# Patient Record
Sex: FEMALE | Race: WHITE | Employment: OTHER | URBAN - METROPOLITAN AREA
[De-identification: names, ages, dates, MRNs, and addresses within clinical notes are randomized per-mention and may not be internally consistent; named-entity substitution may affect disease eponyms.]

---

## 2021-01-31 ENCOUNTER — IMMUNIZATION (OUTPATIENT)
Dept: NURSING | Facility: CLINIC | Age: 81
End: 2021-01-31
Payer: COMMERCIAL

## 2021-01-31 PROCEDURE — 91300 PR COVID VAC PFIZER DIL RECON 30 MCG/0.3 ML IM: CPT

## 2021-01-31 PROCEDURE — 0001A PR COVID VAC PFIZER DIL RECON 30 MCG/0.3 ML IM: CPT

## 2021-02-21 ENCOUNTER — IMMUNIZATION (OUTPATIENT)
Dept: NURSING | Facility: CLINIC | Age: 81
End: 2021-02-21
Attending: FAMILY MEDICINE
Payer: COMMERCIAL

## 2021-02-21 PROCEDURE — 91300 PR COVID VAC PFIZER DIL RECON 30 MCG/0.3 ML IM: CPT

## 2021-02-21 PROCEDURE — 0002A PR COVID VAC PFIZER DIL RECON 30 MCG/0.3 ML IM: CPT

## 2021-04-10 ENCOUNTER — HEALTH MAINTENANCE LETTER (OUTPATIENT)
Age: 81
End: 2021-04-10

## 2021-07-17 ENCOUNTER — HOSPITAL ENCOUNTER (EMERGENCY)
Dept: CT IMAGING | Facility: CLINIC | Age: 81
End: 2021-07-17
Payer: COMMERCIAL

## 2021-07-17 ENCOUNTER — HOSPITAL ENCOUNTER (OUTPATIENT)
Facility: CLINIC | Age: 81
Setting detail: OBSERVATION
Discharge: HOME | End: 2021-07-19
Attending: EMERGENCY MEDICINE | Admitting: INTERNAL MEDICINE
Payer: COMMERCIAL

## 2021-07-17 DIAGNOSIS — B37.31 YEAST VAGINITIS: ICD-10-CM

## 2021-07-17 DIAGNOSIS — N30.00 ACUTE CYSTITIS WITHOUT HEMATURIA: Primary | ICD-10-CM

## 2021-07-17 DIAGNOSIS — R55 SYNCOPE: ICD-10-CM

## 2021-07-17 DIAGNOSIS — K59.00 CONSTIPATION, UNSPECIFIED CONSTIPATION TYPE: ICD-10-CM

## 2021-07-17 PROBLEM — R30.0 DYSURIA: Status: ACTIVE | Noted: 2021-07-17

## 2021-07-17 PROBLEM — H34.8392 VENOUS TRIBUTARY (BRANCH) OCCLUSION OF RETINA (H): Status: ACTIVE | Noted: 2021-07-17

## 2021-07-17 LAB
ALBUMIN SERPL-MCNC: 4 G/DL (ref 3.5–5)
ALBUMIN UR-MCNC: NEGATIVE MG/DL
ALP SERPL-CCNC: 116 U/L (ref 45–120)
ALT SERPL W P-5'-P-CCNC: 18 U/L (ref 0–45)
ANION GAP SERPL CALCULATED.3IONS-SCNC: 10 MMOL/L (ref 5–18)
APPEARANCE UR: CLEAR
AST SERPL W P-5'-P-CCNC: 23 U/L (ref 0–40)
ATRIAL RATE - MUSE: 79 BPM
ATRIAL RATE - MUSE: 86 BPM
BACTERIA #/AREA URNS HPF: ABNORMAL /HPF
BASOPHILS # BLD AUTO: 0 10E3/UL (ref 0–0.2)
BASOPHILS NFR BLD AUTO: 0 %
BILIRUB SERPL-MCNC: 0.6 MG/DL (ref 0–1)
BILIRUB UR QL STRIP: NEGATIVE
BUN SERPL-MCNC: 20 MG/DL (ref 8–28)
CALCIUM SERPL-MCNC: 9.3 MG/DL (ref 8.5–10.5)
CHLORIDE BLD-SCNC: 106 MMOL/L (ref 98–107)
CO2 SERPL-SCNC: 22 MMOL/L (ref 22–31)
COLOR UR AUTO: ABNORMAL
CREAT SERPL-MCNC: 0.75 MG/DL (ref 0.6–1.1)
D DIMER PPP FEU-MCNC: 0.64 UG/ML FEU (ref 0–0.5)
DIASTOLIC BLOOD PRESSURE - MUSE: 74 MMHG
DIASTOLIC BLOOD PRESSURE - MUSE: 77 MMHG
EOSINOPHIL # BLD AUTO: 0.1 10E3/UL (ref 0–0.7)
EOSINOPHIL NFR BLD AUTO: 1 %
ERYTHROCYTE [DISTWIDTH] IN BLOOD BY AUTOMATED COUNT: 12.6 % (ref 10–15)
GFR SERPL CREATININE-BSD FRML MDRD: 74 ML/MIN/1.73M2
GLUCOSE BLD-MCNC: 121 MG/DL (ref 70–125)
GLUCOSE BLDC GLUCOMTR-MCNC: 103 MG/DL (ref 70–125)
GLUCOSE UR STRIP-MCNC: NEGATIVE MG/DL
HCT VFR BLD AUTO: 38.3 % (ref 35–47)
HGB BLD-MCNC: 12.7 G/DL (ref 11.7–15.7)
HGB UR QL STRIP: NEGATIVE
HYALINE CASTS: 18 /LPF
IMM GRANULOCYTES # BLD: 0.1 10E3/UL
IMM GRANULOCYTES NFR BLD: 1 %
INTERPRETATION ECG - MUSE: NORMAL
INTERPRETATION ECG - MUSE: NORMAL
KETONES UR STRIP-MCNC: ABNORMAL MG/DL
LACTATE SERPL-SCNC: 2 MMOL/L (ref 0.7–2)
LEUKOCYTE ESTERASE UR QL STRIP: ABNORMAL
LYMPHOCYTES # BLD AUTO: 1.9 10E3/UL (ref 0.8–5.3)
LYMPHOCYTES NFR BLD AUTO: 19 %
MAGNESIUM SERPL-MCNC: 1.8 MG/DL (ref 1.8–2.6)
MCH RBC QN AUTO: 31.2 PG (ref 26.5–33)
MCHC RBC AUTO-ENTMCNC: 33.2 G/DL (ref 31.5–36.5)
MCV RBC AUTO: 94 FL (ref 78–100)
MONOCYTES # BLD AUTO: 0.7 10E3/UL (ref 0–1.3)
MONOCYTES NFR BLD AUTO: 7 %
MUCOUS THREADS #/AREA URNS LPF: PRESENT /LPF
NEUTROPHILS # BLD AUTO: 7.4 10E3/UL (ref 1.6–8.3)
NEUTROPHILS NFR BLD AUTO: 72 %
NITRATE UR QL: NEGATIVE
NRBC # BLD AUTO: 0 10E3/UL
NRBC BLD AUTO-RTO: 0 /100
P AXIS - MUSE: 58 DEGREES
P AXIS - MUSE: 61 DEGREES
PH UR STRIP: 7 [PH] (ref 5–7)
PLATELET # BLD AUTO: 290 10E3/UL (ref 150–450)
POTASSIUM BLD-SCNC: 3.7 MMOL/L (ref 3.5–5)
PR INTERVAL - MUSE: 178 MS
PR INTERVAL - MUSE: 186 MS
PROCALCITONIN SERPL-MCNC: 0.02 NG/ML (ref 0–0.49)
PROLACTIN SERPL-MCNC: 26.8 NG/ML (ref 0–20)
PROT SERPL-MCNC: 7 G/DL (ref 6–8)
QRS DURATION - MUSE: 80 MS
QRS DURATION - MUSE: 88 MS
QT - MUSE: 418 MS
QT - MUSE: 426 MS
QTC - MUSE: 488 MS
QTC - MUSE: 500 MS
R AXIS - MUSE: 14 DEGREES
R AXIS - MUSE: 30 DEGREES
RBC # BLD AUTO: 4.07 10E6/UL (ref 3.8–5.2)
RBC URINE: 2 /HPF
SARS-COV-2 RNA RESP QL NAA+PROBE: NEGATIVE
SODIUM SERPL-SCNC: 138 MMOL/L (ref 136–145)
SP GR UR STRIP: 1.01 (ref 1–1.03)
SQUAMOUS EPITHELIAL: 1 /HPF
SYSTOLIC BLOOD PRESSURE - MUSE: 162 MMHG
SYSTOLIC BLOOD PRESSURE - MUSE: 163 MMHG
T AXIS - MUSE: 42 DEGREES
T AXIS - MUSE: 62 DEGREES
TROPONIN I SERPL-MCNC: <0.01 NG/ML (ref 0–0.29)
UROBILINOGEN UR STRIP-MCNC: <2 MG/DL
VENTRICULAR RATE- MUSE: 79 BPM
VENTRICULAR RATE- MUSE: 86 BPM
WBC # BLD AUTO: 10.2 10E3/UL (ref 4–11)
WBC URINE: 12 /HPF

## 2021-07-17 PROCEDURE — 87635 SARS-COV-2 COVID-19 AMP PRB: CPT | Performed by: NURSE PRACTITIONER

## 2021-07-17 PROCEDURE — 250N000011 HC RX IP 250 OP 636: Performed by: NURSE PRACTITIONER

## 2021-07-17 PROCEDURE — C9803 HOPD COVID-19 SPEC COLLECT: HCPCS

## 2021-07-17 PROCEDURE — 87040 BLOOD CULTURE FOR BACTERIA: CPT | Performed by: NURSE PRACTITIONER

## 2021-07-17 PROCEDURE — 36592 COLLECT BLOOD FROM PICC: CPT | Performed by: NURSE PRACTITIONER

## 2021-07-17 PROCEDURE — 93005 ELECTROCARDIOGRAM TRACING: CPT | Performed by: EMERGENCY MEDICINE

## 2021-07-17 PROCEDURE — 71275 CT ANGIOGRAPHY CHEST: CPT

## 2021-07-17 PROCEDURE — 85379 FIBRIN DEGRADATION QUANT: CPT | Performed by: NURSE PRACTITIONER

## 2021-07-17 PROCEDURE — 99285 EMERGENCY DEPT VISIT HI MDM: CPT | Mod: 25

## 2021-07-17 PROCEDURE — 99220 PR INITIAL OBSERVATION CARE,LEVEL III: CPT | Performed by: INTERNAL MEDICINE

## 2021-07-17 PROCEDURE — 96361 HYDRATE IV INFUSION ADD-ON: CPT

## 2021-07-17 PROCEDURE — 80053 COMPREHEN METABOLIC PANEL: CPT | Performed by: NURSE PRACTITIONER

## 2021-07-17 PROCEDURE — 84145 PROCALCITONIN (PCT): CPT | Performed by: NURSE PRACTITIONER

## 2021-07-17 PROCEDURE — 36415 COLL VENOUS BLD VENIPUNCTURE: CPT | Performed by: NURSE PRACTITIONER

## 2021-07-17 PROCEDURE — 93005 ELECTROCARDIOGRAM TRACING: CPT | Performed by: NURSE PRACTITIONER

## 2021-07-17 PROCEDURE — 84484 ASSAY OF TROPONIN QUANT: CPT | Performed by: NURSE PRACTITIONER

## 2021-07-17 PROCEDURE — 74177 CT ABD & PELVIS W/CONTRAST: CPT

## 2021-07-17 PROCEDURE — G0378 HOSPITAL OBSERVATION PER HR: HCPCS

## 2021-07-17 PROCEDURE — 96376 TX/PRO/DX INJ SAME DRUG ADON: CPT

## 2021-07-17 PROCEDURE — 83735 ASSAY OF MAGNESIUM: CPT | Performed by: NURSE PRACTITIONER

## 2021-07-17 PROCEDURE — 250N000013 HC RX MED GY IP 250 OP 250 PS 637: Performed by: INTERNAL MEDICINE

## 2021-07-17 PROCEDURE — 84146 ASSAY OF PROLACTIN: CPT | Performed by: NURSE PRACTITIONER

## 2021-07-17 PROCEDURE — 85025 COMPLETE CBC W/AUTO DIFF WBC: CPT | Performed by: NURSE PRACTITIONER

## 2021-07-17 PROCEDURE — 258N000003 HC RX IP 258 OP 636: Performed by: NURSE PRACTITIONER

## 2021-07-17 PROCEDURE — 70450 CT HEAD/BRAIN W/O DYE: CPT

## 2021-07-17 PROCEDURE — 96375 TX/PRO/DX INJ NEW DRUG ADDON: CPT

## 2021-07-17 PROCEDURE — 258N000003 HC RX IP 258 OP 636: Performed by: INTERNAL MEDICINE

## 2021-07-17 PROCEDURE — 87086 URINE CULTURE/COLONY COUNT: CPT | Performed by: NURSE PRACTITIONER

## 2021-07-17 PROCEDURE — 96374 THER/PROPH/DIAG INJ IV PUSH: CPT | Mod: 59

## 2021-07-17 PROCEDURE — 83605 ASSAY OF LACTIC ACID: CPT | Performed by: NURSE PRACTITIONER

## 2021-07-17 PROCEDURE — 81001 URINALYSIS AUTO W/SCOPE: CPT | Performed by: NURSE PRACTITIONER

## 2021-07-17 RX ORDER — IOPAMIDOL 755 MG/ML
100 INJECTION, SOLUTION INTRAVASCULAR ONCE
Status: COMPLETED | OUTPATIENT
Start: 2021-07-17 | End: 2021-07-17

## 2021-07-17 RX ORDER — VALACYCLOVIR HYDROCHLORIDE 1 G/1
1 TABLET, FILM COATED ORAL 3 TIMES DAILY
COMMUNITY
Start: 2021-07-15 | End: 2023-01-01

## 2021-07-17 RX ORDER — LORAZEPAM 2 MG/ML
0.5 INJECTION INTRAMUSCULAR ONCE
Status: COMPLETED | OUTPATIENT
Start: 2021-07-17 | End: 2021-07-17

## 2021-07-17 RX ORDER — PANTOPRAZOLE SODIUM 40 MG/1
TABLET, DELAYED RELEASE ORAL
COMMUNITY
End: 2021-07-17

## 2021-07-17 RX ORDER — ONDANSETRON 2 MG/ML
4 INJECTION INTRAMUSCULAR; INTRAVENOUS ONCE
Status: COMPLETED | OUTPATIENT
Start: 2021-07-17 | End: 2021-07-17

## 2021-07-17 RX ORDER — ACETAMINOPHEN 325 MG/1
650 TABLET ORAL EVERY 4 HOURS PRN
Status: DISCONTINUED | OUTPATIENT
Start: 2021-07-17 | End: 2021-07-19 | Stop reason: HOSPADM

## 2021-07-17 RX ORDER — SODIUM CHLORIDE 9 MG/ML
INJECTION, SOLUTION INTRAVENOUS CONTINUOUS
Status: DISCONTINUED | OUTPATIENT
Start: 2021-07-17 | End: 2021-07-18

## 2021-07-17 RX ADMIN — ONDANSETRON 4 MG: 2 INJECTION INTRAMUSCULAR; INTRAVENOUS at 17:57

## 2021-07-17 RX ADMIN — IOPAMIDOL 100 ML: 755 INJECTION, SOLUTION INTRAVENOUS at 19:19

## 2021-07-17 RX ADMIN — SODIUM CHLORIDE 1000 ML: 9 INJECTION, SOLUTION INTRAVENOUS at 17:59

## 2021-07-17 RX ADMIN — LORAZEPAM 0.5 MG: 2 INJECTION INTRAMUSCULAR; INTRAVENOUS at 19:53

## 2021-07-17 RX ADMIN — MAGNESIUM HYDROXIDE 30 ML: 400 SUSPENSION ORAL at 23:45

## 2021-07-17 RX ADMIN — ACETAMINOPHEN 650 MG: 325 TABLET, FILM COATED ORAL at 23:43

## 2021-07-17 RX ADMIN — SODIUM CHLORIDE: 9 INJECTION, SOLUTION INTRAVENOUS at 23:06

## 2021-07-17 RX ADMIN — ONDANSETRON 4 MG: 2 INJECTION INTRAMUSCULAR; INTRAVENOUS at 18:35

## 2021-07-17 ASSESSMENT — ACTIVITIES OF DAILY LIVING (ADL)
DRESSING/BATHING_DIFFICULTY: NO
VISION_MANAGEMENT: READING
DOING_ERRANDS_INDEPENDENTLY_DIFFICULTY: NO
CONCENTRATING,_REMEMBERING_OR_MAKING_DECISIONS_DIFFICULTY: NO
WALKING_OR_CLIMBING_STAIRS: STAIR CLIMBING DIFFICULTY, ASSISTANCE 1 PERSON
DIFFICULTY_COMMUNICATING: NO
TOILETING_ISSUES: NO
DIFFICULTY_EATING/SWALLOWING: NO
WALKING_OR_CLIMBING_STAIRS_DIFFICULTY: YES
FALL_HISTORY_WITHIN_LAST_SIX_MONTHS: NO
WEAR_GLASSES_OR_BLIND: YES

## 2021-07-17 ASSESSMENT — ENCOUNTER SYMPTOMS
VOMITING: 1
HEADACHES: 1
ABDOMINAL PAIN: 1
SHORTNESS OF BREATH: 0
WEAKNESS: 1
NAUSEA: 1

## 2021-07-17 ASSESSMENT — MIFFLIN-ST. JEOR: SCORE: 1177.1

## 2021-07-17 NOTE — ED PROVIDER NOTES
EMERGENCY DEPARTMENT ENCOUNTER      NAME: Anabelle Cameron  AGE: 80 year old female  YOB: 1940  MRN: [id number]  EVALUATION DATE & TIME: 2021  5:22 PM    PCP: [facility]    ED PROVIDER: CARLOTA Akers, CNP      Chief Complaint   Patient presents with     Syncope         FINAL IMPRESSION:  1. Syncope    2. Constipation, unspecified constipation type          ED COURSE & MEDICAL DECISION MAKIN:41 PM I met with the patient, obtained history, performed an initial exam, and discussed options and plan for treatment here in the ED. PPE includes surgical mask.  6:12 PM I staffed case with Dr. Nayan Victoria MD.  6:17 PM I checked in with patient and obtained additional history from family.  6:50 PM rechecked patient. Did have a brief episode of chest pain. She is alert and daughter in law is at the bedside. Repeat EKG ordered.  7:45 PM updated patient and daughter in law with lab results. UA abnormal, but she denies any UTI symptoms.   8:15 PM I updated patient with lab and imaging results.   9:04 PM I discussed case with Dr. Scott - hospitalist for admission.    Pertinent Labs & Imaging studies reviewed. (See chart for details)  80 year old female presents to the Emergency Department for evaluation of syncope. Had 2 syncopal episodes today. History of vaso vagal syncope though family though that today's episodes were more abrupt and she did not improve as quickly. Did have shaking of her arms today but this is consistent with prior episodes. She was sitting in a chair and did not suffer any trauma. Labs are unremarkable. EKG's also stable appearing. Imaging or the head, chest and abdomen do not show any significant acute process. It does show constipation which the patient endorses. UA abnormal, but patient denies any clear UTI symptoms. Given the more profound nature of her symptoms today, will observe overnight on telemetry.     At the conclusion of the encounter  "I discussed the results of all of the tests and the disposition. The questions were answered. The patient or family acknowledged understanding and was agreeable with the care plan.        MEDICATIONS GIVEN IN THE EMERGENCY:  Medications   0.9% sodium chloride BOLUS (1,000 mLs Intravenous New Bag 7/17/21 1759)   ondansetron (ZOFRAN) injection 4 mg (4 mg Intravenous Given 7/17/21 1757)   ondansetron (ZOFRAN) injection 4 mg (4 mg Intravenous Given 7/17/21 1835)   LORazepam (ATIVAN) injection 0.5 mg (0.5 mg Intravenous Given 7/17/21 1953)       NEW PRESCRIPTIONS STARTED AT TODAY'S ER VISIT  New Prescriptions    No medications on file            =================================================================    HPI    Patient information was obtained from: Patient     Use of Intrepreter: N/A         Anabelle Cameron is a 80 year old female with a history of GERD, meningioma, depression, HLD, and pancreatitis who presents by EMS for evaluation of syncope.    Earlier today, patient was sitting a chair outside by the pool when she had a syncopal episode that lasted a few minutes. Denies any fall.   Starting today, she also endorses mild abdominal pain \"like I'm going to have diarrhea,\" nausea, emesis, mild headache, and generalized weakness. Denies chest pain, shortness of breath, or any other complaints.    Additional history per patient's daughter-in-law and  obtained at 6:17 PM.  Patient was sitting in the shade outside when she stopped talking midsentence and became pale.  They noted that her hands were shaking and her lips were smacking which is not unusual for her typical vasovagal episodes.  Family felt that her symptoms today were more abrupt.  She did come out of it for about 5 minutes and had a second episode.  Was helped to the floor.  There was no trauma. No other new symptoms noted.     REVIEW OF SYSTEMS   Review of Systems   Respiratory: Negative for shortness of breath.    Cardiovascular: Negative for " chest pain.   Gastrointestinal: Positive for abdominal pain, nausea and vomiting.   Neurological: Positive for syncope, weakness (generalized) and headaches.   All other systems reviewed and are negative.      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 LEFT EYE Occurred at time of IOL/ERM       PAST SURGICAL HISTORY:  Past Surgical History:   Procedure Laterality Date     APPENDECTOMY OPEN       GYN SURGERY       ORTHOPEDIC SURGERY             CURRENT MEDICATIONS:    Prior to Admission Medications   Prescriptions Last Dose Informant Patient Reported? Taking?   Estrogens Conjugated (PREMARIN PO)   Yes No   Sig: Take  by mouth.     Fexofenadine HCl (ALLEGRA PO)   Yes No   Sig: Take  by mouth.     multivitamin, therapeutic with minerals (THERA-VIT-M) TABS   Yes No   Sig: Take 1 tablet by mouth daily.     ondansetron (ZOFRAN ODT) 4 MG disintegrating tablet   No No   Sig: Take 1 tablet by mouth every 4 hours as needed for nausea for 5 doses.   pantoprazole (PROTONIX) 40 MG EC tablet   Yes No   Sig: pantoprazole 40 mg tablet,delayed release   sertraline (ZOLOFT) 20 MG/ML concentrated solution   Yes No   Sig: Take 50 mg by mouth daily.     valACYclovir (VALTREX) 1000 mg tablet   Yes Yes   Sig: Take 1 g by mouth      Facility-Administered Medications: None           ALLERGIES:  Allergies   Allergen Reactions     Levofloxacin Other (See Comments), Dizziness and Nausea     dizziness  Patient unable to tolerate side effects       Hydrocodone-Acetaminophen Other (See Comments)     Dizziness,  fainting  Other reaction(s): Syncope  Vasovagal episode         Oxycodone-Acetaminophen Other (See Comments)     Dizziness, fainting       FAMILY HISTORY:  History reviewed. No pertinent family history.    SOCIAL HISTORY:   Social History     Socioeconomic History     Marital status:      Spouse name: None     Number of children: None     Years of education: None     Highest education level: None   Occupational History     None   Tobacco Use     Smoking status: None   Substance and Sexual Activity     Alcohol use: Yes     Comment: social     Drug use: No     Sexual activity: None   Other Topics Concern     None   Social History Narrative     None     Social Determinants of Health     Financial Resource Strain:      Difficulty of Paying Living Expenses:    Food Insecurity:      Worried About Running Out of Food in the Last Year:      Ran Out of Food in the Last Year:    Transportation Needs:      Lack of Transportation (Medical):      Lack of Transportation (Non-Medical):    Physical Activity:      Days of Exercise per Week:      Minutes of Exercise per Session:    Stress:      Feeling of Stress :    Social Connections:      Frequency of Communication with Friends and Family:      Frequency of Social Gatherings with Friends and Family:      Attends Orthodoxy Services:      Active Member of Clubs or Organizations:      Attends Club or Organization Meetings:      Marital Status:    Intimate Partner Violence:      Fear of Current or Ex-Partner:      Emotionally Abused:      Physically Abused:      Sexually Abused:          VITALS:  Patient Vitals for the past 24 hrs:   BP Temp Temp src Pulse Resp SpO2   07/17/21 1900 (!) 152/72 -- -- 86 24 100 %   07/17/21 1845 -- -- -- 86 24 100 %   07/17/21 1830 (!) 162/74 -- -- 80 13 100 %   07/17/21 1815 -- -- -- 84 14 100 %   07/17/21 1800 (!) 163/65 -- -- 86 (!) 31 100 %   07/17/21 1745 -- -- -- 75 11 100 %   07/17/21 1741 -- -- -- 79 22 98 %   07/17/21 1740 -- -- -- 75 (!)  6 99 %   07/17/21 1739 -- -- -- 76 19 98 %   07/17/21 1738 -- -- -- 75 8 99 %   07/17/21 1736 -- -- -- 75 (!) 6 95 %   07/17/21 1735 -- -- -- 78 15 96 %   07/17/21 1734 -- -- -- 81 22 (!) 82 %   07/17/21 1733 -- -- -- 80 16 (!) 87 %   07/17/21 1732 -- -- -- 78 11 93 %   07/17/21 1731 -- -- -- 79 -- 95 %   07/17/21 1730 (!) 163/77 (!) 96.2  F (35.7  C) Temporal 80 17 93 %       PHYSICAL EXAM    Constitutional:  Drowsy. No distress  EYES: Conjunctivae clear  HENT:  Atraumatic, normocephalic. Oropharynx appears dry.  Respiratory:  No respiratory distress, normal breath sounds  Cardiovascular:  Normal rate, normal rhythm, no murmurs  GI:  Soft, nondistended, mild periumbilical tenderness, no palpable masses, no rebound, no guarding   Musculoskeletal:  No edema    Integument: Warm, Dry. Small raised red rash on the left breast.   Neurologic:  Alert & oriented x 3. CN 3-12 grossly intact. 4/5 bilateral , dorsiflexion, plantar flexion.     LAB:  All pertinent labs reviewed and interpreted.  Results for orders placed or performed during the hospital encounter of 07/17/21   CT Chest Pulmonary Embolism w Contrast    Impression    IMPRESSION:  1.  No pulmonary embolism.    2.  Few subpleural regions of scarring in the lungs.    3.  Small to moderate size esophageal hiatal hernia.    4.  Fatty liver.    5.  The rectum is distended with stool.    6.  Hysterectomy.    7.  Coronary artery calcification.   Head CT w/o contrast    Impression    IMPRESSION:    1.  No evidence of acute intracranial hemorrhage or mass effect.  2.  Calcified meningioma overlying the left parafalcine frontal lobe measuring 9 mm.   3.  Mild nonspecific white matter changes.  4.  Mild brain parenchymal volume loss.   CT Abdomen Pelvis w Contrast    Impression    IMPRESSION:  1.  No pulmonary embolism.    2.  Few subpleural regions of scarring in the lungs.    3.  Small to moderate size esophageal hiatal hernia.    4.  Fatty liver.    5.  The rectum is  distended with stool.    6.  Hysterectomy.    7.  Coronary artery calcification.   Result Value Ref Range    Magnesium 1.8 1.8 - 2.6 mg/dL   Comprehensive metabolic panel   Result Value Ref Range    Sodium 138 136 - 145 mmol/L    Potassium 3.7 3.5 - 5.0 mmol/L    Chloride 106 98 - 107 mmol/L    Carbon Dioxide (CO2) 22 22 - 31 mmol/L    Anion Gap 10 5 - 18 mmol/L    Urea Nitrogen 20 8 - 28 mg/dL    Creatinine 0.75 0.60 - 1.10 mg/dL    Calcium 9.3 8.5 - 10.5 mg/dL    Glucose 121 70 - 125 mg/dL    Alkaline Phosphatase 116 45 - 120 U/L    AST 23 0 - 40 U/L    ALT 18 0 - 45 U/L    Protein Total 7.0 6.0 - 8.0 g/dL    Albumin 4.0 3.5 - 5.0 g/dL    Bilirubin Total 0.6 0.0 - 1.0 mg/dL    GFR Estimate 74 >60 mL/min/1.73m2   UA with Microscopic reflex to Culture    Specimen: Urine, Midstream   Result Value Ref Range    Color Urine Light Yellow Colorless, Straw, Light Yellow, Yellow    Appearance Urine Clear Clear    Glucose Urine Negative Negative mg/dL    Bilirubin Urine Negative Negative    Ketones Urine Trace (A) Negative mg/dL    Specific Gravity Urine 1.014 1.001 - 1.030    Blood Urine Negative Negative    pH Urine 7.0 5.0 - 7.0    Protein Albumin Urine Negative Negative mg/dL    Urobilinogen Urine <2.0 <2.0 mg/dL    Nitrite Urine Negative Negative    Leukocyte Esterase Urine 75 Sherri/uL (A) Negative    Bacteria Urine Few (A) None Seen /HPF    Mucus Urine Present (A) None Seen /LPF    RBC Urine 2 <=2 /HPF    WBC Urine 12 (H) <=5 /HPF    Squamous Epithelials Urine 1 <=1 /HPF    Hyaline Casts Urine 18 (H) <=2 /LPF   Troponin I (now)   Result Value Ref Range    Troponin I <0.01 0.00 - 0.29 ng/mL   D dimer quantitative   Result Value Ref Range    D-Dimer Quantitative 0.64 (H) 0.00 - 0.50 ug/mL FEU   CBC with platelets and differential   Result Value Ref Range    WBC Count 10.2 4.0 - 11.0 10e3/uL    RBC Count 4.07 3.80 - 5.20 10e6/uL    Hemoglobin 12.7 11.7 - 15.7 g/dL    Hematocrit 38.3 35.0 - 47.0 %    MCV 94 78 - 100 fL     MCH 31.2 26.5 - 33.0 pg    MCHC 33.2 31.5 - 36.5 g/dL    RDW 12.6 10.0 - 15.0 %    Platelet Count 290 150 - 450 10e3/uL    % Neutrophils 72 %    % Lymphocytes 19 %    % Monocytes 7 %    % Eosinophils 1 %    % Basophils 0 %    % Immature Granulocytes 1 %    NRBCs per 100 WBC 0 <1 /100    Absolute Neutrophils 7.4 1.6 - 8.3 10e3/uL    Absolute Lymphocytes 1.9 0.8 - 5.3 10e3/uL    Absolute Monocytes 0.7 0.0 - 1.3 10e3/uL    Absolute Eosinophils 0.1 0.0 - 0.7 10e3/uL    Absolute Basophils 0.0 0.0 - 0.2 10e3/uL    Absolute Immature Granulocytes 0.1 (H) <=0.0 10e3/uL    Absolute NRBCs 0.0 10e3/uL   Lactic acid whole blood   Result Value Ref Range    Lactic Acid 2.0 0.7 - 2.0 mmol/L   Result Value Ref Range    Procalcitonin 0.02 0.00 - 0.49 ng/mL   Glucose by meter   Result Value Ref Range    GLUCOSE BY METER POCT 103 70 - 125 mg/dL   ECG 12-LEAD WITH MUSE ([facility])   Result Value Ref Range    Systolic Blood Pressure 163 mmHg    Diastolic Blood Pressure 77 mmHg    Ventricular Rate 79 BPM    Atrial Rate 79 BPM    AK Interval 178 ms    QRS Duration 88 ms     ms    QTc 488 ms    P Axis 58 degrees    R AXIS 14 degrees    T Axis 42 degrees    Interpretation ECG       Sinus rhythm  Prolonged QT  Abnormal ECG  When compared with ECG of 14-JAN-2021 17:26,  Criteria for Septal infarct are no longer Present  Confirmed by SEE ED PROVIDER NOTE FOR, ECG INTERPRETATION ([id number]),  MORTEZA ENRIQUEZ ([id number]) on 7/17/2021 5:49:03 PM     ECG 12-LEAD WITH MUSE (L[facility])   Result Value Ref Range    Systolic Blood Pressure 162 mmHg    Diastolic Blood Pressure 74 mmHg    Ventricular Rate 86 BPM    Atrial Rate 86 BPM    AK Interval 186 ms    QRS Duration 80 ms     ms    QTc 500 ms    P Axis 61 degrees    R AXIS 30 degrees    T Axis 62 degrees    Interpretation ECG       Sinus rhythm  Prolonged QT  Abnormal ECG  When compared with ECG of 17-JUL-2021 17:34,  No significant change was found  Confirmed by SEE ED PROVIDER NOTE FOR, ECG  INTERPRETATION ([id number]),  SABRINA SPANN ([id number]) on 7/17/2021 7:17:49 PM         RADIOLOGY:  Reviewed all pertinent imaging. Please see official radiology report.  Head CT w/o contrast   Final Result   IMPRESSION:     1.  No evidence of acute intracranial hemorrhage or mass effect.   2.  Calcified meningioma overlying the left parafalcine frontal lobe measuring 9 mm.    3.  Mild nonspecific white matter changes.   4.  Mild brain parenchymal volume loss.      CT Abdomen Pelvis w Contrast   Final Result   IMPRESSION:   1.  No pulmonary embolism.      2.  Few subpleural regions of scarring in the lungs.      3.  Small to moderate size esophageal hiatal hernia.      4.  Fatty liver.      5.  The rectum is distended with stool.      6.  Hysterectomy.      7.  Coronary artery calcification.      CT Chest Pulmonary Embolism w Contrast   Final Result   IMPRESSION:   1.  No pulmonary embolism.      2.  Few subpleural regions of scarring in the lungs.      3.  Small to moderate size esophageal hiatal hernia.      4.  Fatty liver.      5.  The rectum is distended with stool.      6.  Hysterectomy.      7.  Coronary artery calcification.        EKG Interpretation  7/17/2021 at 5:34 PM    Rhythm: normal sinus  Rate: 79 BPM  Axis: normal  Ectopy: none  Conduction: QTc interval 488 ms  ST Segments: no acute change  T Waves: no acute change  Q Waves: none    Clinical Impression: Sinus rhythm.  Prolonged QTC.  No ischemic changes.  When compared to prior EKG performed on 1/14/2021, evidence for septal infarct no longer present.  No other significant change  I have independentlyreviewed and interpreted the patient's EKG with comments made as listed above.  Please see scanned image for full interpretation    EKG Interpretation  7/17/2021 at 6:54 PM    Rhythm: normal sinus  Rate: 86 BPM  Axis: normal  Ectopy: none  Conduction: Long QTC, 500 ms  ST Segments: no acute change  T Waves: no acute change  Q Waves: none    Clinical  Impression: Sinus rhythm.  No ischemic changes.  Prolonged QTC.    I have independentlyreviewed and interpreted the patient's EKG with comments made as listed above.  Please see scanned image for full interpretation      PROCEDURES:   None      I, Meena [place], am serving as a scribe to document services personally performed by Zeeshan Carlson CNP. based on my observation and the provider's statements to me. I, Zeeshan Carlson CNP attest that Meena Celaya is acting in a scribe capacity, has observed my performance of the services and has documented them in accordance with my direction.    CARLOTA Akers, CNP  Emergency Medicine  [facility] EMERGENCY ROOM  [address] [id number]  [phone]  Dept: [phone]           Zeeshan Carlson APRN CNP  07/17/21 [id number]

## 2021-07-17 NOTE — ED TRIAGE NOTES
Pt brought in by EMS after fainting twice today. Pt was sitting outside for about 303-60 minutes before this happened and then passed out. Denies  Injury as she states she was sitting in a chair each time it happened. Pt had IV established by EMS, 20 in left mid forearm.

## 2021-07-18 ENCOUNTER — APPOINTMENT (OUTPATIENT)
Dept: PHYSICAL THERAPY | Facility: CLINIC | Age: 81
End: 2021-07-18
Attending: FAMILY MEDICINE
Payer: COMMERCIAL

## 2021-07-18 ENCOUNTER — HOSPITAL ENCOUNTER (OUTPATIENT)
Dept: CARDIOLOGY | Facility: CLINIC | Age: 81
Setting detail: OBSERVATION
End: 2021-07-18
Attending: INTERNAL MEDICINE
Payer: COMMERCIAL

## 2021-07-18 DIAGNOSIS — R55 SYNCOPE: Primary | ICD-10-CM

## 2021-07-18 LAB
ALBUMIN SERPL-MCNC: 3.4 G/DL (ref 3.5–5)
ALP SERPL-CCNC: 100 U/L (ref 45–120)
ALT SERPL W P-5'-P-CCNC: 14 U/L (ref 0–45)
ANION GAP SERPL CALCULATED.3IONS-SCNC: 5 MMOL/L (ref 5–18)
AST SERPL W P-5'-P-CCNC: 19 U/L (ref 0–40)
BILIRUB SERPL-MCNC: 0.6 MG/DL (ref 0–1)
BUN SERPL-MCNC: 16 MG/DL (ref 8–28)
CALCIUM SERPL-MCNC: 8.6 MG/DL (ref 8.5–10.5)
CHLORIDE BLD-SCNC: 106 MMOL/L (ref 98–107)
CO2 SERPL-SCNC: 24 MMOL/L (ref 22–31)
CREAT SERPL-MCNC: 0.63 MG/DL (ref 0.6–1.1)
ERYTHROCYTE [DISTWIDTH] IN BLOOD BY AUTOMATED COUNT: 12.5 % (ref 10–15)
GFR SERPL CREATININE-BSD FRML MDRD: 84 ML/MIN/1.73M2
GLUCOSE BLD-MCNC: 97 MG/DL (ref 70–125)
HCT VFR BLD AUTO: 35.5 % (ref 35–47)
HGB BLD-MCNC: 11.8 G/DL (ref 11.7–15.7)
MCH RBC QN AUTO: 31.4 PG (ref 26.5–33)
MCHC RBC AUTO-ENTMCNC: 33.2 G/DL (ref 31.5–36.5)
MCV RBC AUTO: 94 FL (ref 78–100)
PLATELET # BLD AUTO: 279 10E3/UL (ref 150–450)
POTASSIUM BLD-SCNC: 3.8 MMOL/L (ref 3.5–5)
PROT SERPL-MCNC: 6 G/DL (ref 6–8)
RBC # BLD AUTO: 3.76 10E6/UL (ref 3.8–5.2)
SODIUM SERPL-SCNC: 135 MMOL/L (ref 136–145)
TROPONIN I SERPL-MCNC: <0.01 NG/ML (ref 0–0.29)
WBC # BLD AUTO: 9.1 10E3/UL (ref 4–11)

## 2021-07-18 PROCEDURE — 999N000208 ECHOCARDIOGRAM COMPLETE

## 2021-07-18 PROCEDURE — 97116 GAIT TRAINING THERAPY: CPT | Mod: GP

## 2021-07-18 PROCEDURE — 250N000013 HC RX MED GY IP 250 OP 250 PS 637: Performed by: INTERNAL MEDICINE

## 2021-07-18 PROCEDURE — 93010 ELECTROCARDIOGRAM REPORT: CPT | Performed by: INTERNAL MEDICINE

## 2021-07-18 PROCEDURE — 255N000002 HC RX 255 OP 636: Performed by: INTERNAL MEDICINE

## 2021-07-18 PROCEDURE — 93005 ELECTROCARDIOGRAM TRACING: CPT | Performed by: INTERNAL MEDICINE

## 2021-07-18 PROCEDURE — 84484 ASSAY OF TROPONIN QUANT: CPT | Performed by: INTERNAL MEDICINE

## 2021-07-18 PROCEDURE — 99226 PR SUBSEQUENT OBSERVATION CARE,LEVEL III: CPT | Performed by: FAMILY MEDICINE

## 2021-07-18 PROCEDURE — 84155 ASSAY OF PROTEIN SERUM: CPT | Performed by: INTERNAL MEDICINE

## 2021-07-18 PROCEDURE — 97161 PT EVAL LOW COMPLEX 20 MIN: CPT | Mod: GP

## 2021-07-18 PROCEDURE — 96361 HYDRATE IV INFUSION ADD-ON: CPT

## 2021-07-18 PROCEDURE — 258N000003 HC RX IP 258 OP 636: Performed by: INTERNAL MEDICINE

## 2021-07-18 PROCEDURE — 82247 BILIRUBIN TOTAL: CPT | Performed by: INTERNAL MEDICINE

## 2021-07-18 PROCEDURE — 999N000157 HC STATISTIC RCP TIME EA 10 MIN

## 2021-07-18 PROCEDURE — 250N000013 HC RX MED GY IP 250 OP 250 PS 637: Performed by: FAMILY MEDICINE

## 2021-07-18 PROCEDURE — 93306 TTE W/DOPPLER COMPLETE: CPT | Mod: 26 | Performed by: INTERNAL MEDICINE

## 2021-07-18 PROCEDURE — 36415 COLL VENOUS BLD VENIPUNCTURE: CPT | Performed by: INTERNAL MEDICINE

## 2021-07-18 PROCEDURE — 85027 COMPLETE CBC AUTOMATED: CPT | Performed by: INTERNAL MEDICINE

## 2021-07-18 PROCEDURE — G0378 HOSPITAL OBSERVATION PER HR: HCPCS

## 2021-07-18 PROCEDURE — 96375 TX/PRO/DX INJ NEW DRUG ADDON: CPT | Mod: XU

## 2021-07-18 PROCEDURE — 250N000011 HC RX IP 250 OP 636: Performed by: FAMILY MEDICINE

## 2021-07-18 RX ORDER — VALACYCLOVIR HYDROCHLORIDE 1 G/1
1000 TABLET, FILM COATED ORAL 3 TIMES DAILY
Status: DISCONTINUED | OUTPATIENT
Start: 2021-07-18 | End: 2021-07-19 | Stop reason: HOSPADM

## 2021-07-18 RX ORDER — AMOXICILLIN 250 MG
1 CAPSULE ORAL 2 TIMES DAILY
Status: COMPLETED | OUTPATIENT
Start: 2021-07-18 | End: 2021-07-18

## 2021-07-18 RX ORDER — PANTOPRAZOLE SODIUM 20 MG/1
40 TABLET, DELAYED RELEASE ORAL 2 TIMES DAILY
Status: DISCONTINUED | OUTPATIENT
Start: 2021-07-18 | End: 2021-07-19 | Stop reason: HOSPADM

## 2021-07-18 RX ORDER — CEFTRIAXONE 1 G/1
1 INJECTION, POWDER, FOR SOLUTION INTRAMUSCULAR; INTRAVENOUS EVERY 24 HOURS
Status: DISCONTINUED | OUTPATIENT
Start: 2021-07-18 | End: 2021-07-19 | Stop reason: HOSPADM

## 2021-07-18 RX ADMIN — CEFTRIAXONE 1 G: 1 INJECTION, POWDER, FOR SOLUTION INTRAMUSCULAR; INTRAVENOUS at 08:46

## 2021-07-18 RX ADMIN — ACETAMINOPHEN 650 MG: 325 TABLET, FILM COATED ORAL at 21:02

## 2021-07-18 RX ADMIN — DOCUSATE SODIUM 50MG AND SENNOSIDES 8.6MG 1 TABLET: 8.6; 5 TABLET, FILM COATED ORAL at 21:01

## 2021-07-18 RX ADMIN — ACETAMINOPHEN 650 MG: 325 TABLET, FILM COATED ORAL at 08:46

## 2021-07-18 RX ADMIN — SODIUM CHLORIDE: 9 INJECTION, SOLUTION INTRAVENOUS at 13:13

## 2021-07-18 RX ADMIN — DOCUSATE SODIUM 50MG AND SENNOSIDES 8.6MG 1 TABLET: 8.6; 5 TABLET, FILM COATED ORAL at 08:57

## 2021-07-18 RX ADMIN — PANTOPRAZOLE SODIUM 40 MG: 20 TABLET, DELAYED RELEASE ORAL at 08:46

## 2021-07-18 RX ADMIN — PANTOPRAZOLE SODIUM 40 MG: 20 TABLET, DELAYED RELEASE ORAL at 21:02

## 2021-07-18 RX ADMIN — PERFLUTREN 2 ML: 6.52 INJECTION, SUSPENSION INTRAVENOUS at 11:35

## 2021-07-18 ASSESSMENT — MIFFLIN-ST. JEOR: SCORE: 1188.9

## 2021-07-18 NOTE — PLAN OF CARE
Problem: Syncope  Goal: Absence of Syncopal Symptoms  Outcome: Improving   Patient denies any dizziness/lightheadedness. Orthostatic pressures unremarkable.     Waiting for urine culture results. Patient on IV antibiotics.     Mild headache present, improved with tylenol.     Lesa Corrigan RN 7/18/2021

## 2021-07-18 NOTE — PLAN OF CARE
Physical Therapy Discharge Summary    Reason for therapy discharge:    All goals and outcomes met, no further needs identified.    Progress towards therapy goal(s). See goals on Care Plan in [facility] electronic health record for goal details.  Goals met    Therapy recommendation(s):    No further therapy is recommended.Patient to amb with nursing staff while remains in hospital.

## 2021-07-18 NOTE — ED PROVIDER NOTES
"ED CONSULTATION  Date/Time:7/17/2021 6:16 PM    I am seeing this patient along with Zeeshan Carlson CNP.  I, Nayan Victoria MD have reviewed the documentation, personally taken the patient's history, performed an exam and agree with the physical finds, diagnosis and management plan.    HPI: Anabelle Cameron is a 80 year old female with a history of GERD, meningioma, depression, HLD, and pancreatitis who presents by EMS for evaluation of syncope. Earlier today, patient was sitting a chair outside by the pool when she had a syncopal episode that lasted a few minutes. Denies any fall. Starting today, she also endorses mild abdominal pain \"like I'm going to have diarrhea,\" nausea, emesis, mild headache, and generalized weakness.    Physical Exam:BP (!) 152/72   Pulse 86   Temp (!) 96.2  F (35.7  C) (Temporal)   Resp 24   SpO2 100%   Constitutional:  Well developed, Well nourished  HENT:  Normocephalic, Atraumatic, Bilateral external ears normal, Oropharynx moist, No oral exudates, Nose normal. Neck- Normal range of motion   Eyes: Conjunctiva normal, No discharge.   Respiratory:  Normal breath sounds, No respiratory distress, No wheezing  Cardiovascular:  Normal heart rate, Normal rhythm.   GI:  Soft, No tenderness, No masses, No flank tenderness.   Musculoskeletal: Full ROM  Integument:  Warm, Dry, No erythema, No rash.    Neurologic:  Alert & oriented.  No focal deficits appreciated  Psychiatric:  Affect normal, Judgment normal, Mood normal.     MDM/ ED Course : Only female with apparent syncopal/presyncopal event fairly prolonged.  Friend who accompanies her states she had a similar episode but less prolonged last week.  No preceding vomiting or diarrhea to suggest fluid losses however patient had been out of doors but in the shade.  She states she tolerates heat poorly.  She also reports has been constipated recently but was not having any urges at the time not having any nausea or vomiting.  Her exam is quite " benign.  Patient had been recently started on valacyclovir for potential shingles to the left breast.  On inspection patient has a few tiny erythematous papules which are not entirely irritating.  Patient may be tolerating the valacyclovir poorly and it was recommended that this be discontinued and topical antiitch medication be used.  Given patient's age and symptomatology will will admit for monitoring.  8:15 PM I introduced myself to the patient and performed my initial exam.       Final diagnosis:  Syncope    Results for orders placed or performed during the hospital encounter of 07/17/21   CT Chest Pulmonary Embolism w Contrast    Impression    IMPRESSION:  1.  No pulmonary embolism.    2.  Few subpleural regions of scarring in the lungs.    3.  Small to moderate size esophageal hiatal hernia.    4.  Fatty liver.    5.  The rectum is distended with stool.    6.  Hysterectomy.    7.  Coronary artery calcification.   Head CT w/o contrast    Impression    IMPRESSION:    1.  No evidence of acute intracranial hemorrhage or mass effect.  2.  Calcified meningioma overlying the left parafalcine frontal lobe measuring 9 mm.   3.  Mild nonspecific white matter changes.  4.  Mild brain parenchymal volume loss.   CT Abdomen Pelvis w Contrast    Impression    IMPRESSION:  1.  No pulmonary embolism.    2.  Few subpleural regions of scarring in the lungs.    3.  Small to moderate size esophageal hiatal hernia.    4.  Fatty liver.    5.  The rectum is distended with stool.    6.  Hysterectomy.    7.  Coronary artery calcification.   Result Value Ref Range    Magnesium 1.8 1.8 - 2.6 mg/dL   Comprehensive metabolic panel   Result Value Ref Range    Sodium 138 136 - 145 mmol/L    Potassium 3.7 3.5 - 5.0 mmol/L    Chloride 106 98 - 107 mmol/L    Carbon Dioxide (CO2) 22 22 - 31 mmol/L    Anion Gap 10 5 - 18 mmol/L    Urea Nitrogen 20 8 - 28 mg/dL    Creatinine 0.75 0.60 - 1.10 mg/dL    Calcium 9.3 8.5 - 10.5 mg/dL    Glucose 121 70  - 125 mg/dL    Alkaline Phosphatase 116 45 - 120 U/L    AST 23 0 - 40 U/L    ALT 18 0 - 45 U/L    Protein Total 7.0 6.0 - 8.0 g/dL    Albumin 4.0 3.5 - 5.0 g/dL    Bilirubin Total 0.6 0.0 - 1.0 mg/dL    GFR Estimate 74 >60 mL/min/1.73m2   UA with Microscopic reflex to Culture    Specimen: Urine, Midstream   Result Value Ref Range    Color Urine Light Yellow Colorless, Straw, Light Yellow, Yellow    Appearance Urine Clear Clear    Glucose Urine Negative Negative mg/dL    Bilirubin Urine Negative Negative    Ketones Urine Trace (A) Negative mg/dL    Specific Gravity Urine 1.014 1.001 - 1.030    Blood Urine Negative Negative    pH Urine 7.0 5.0 - 7.0    Protein Albumin Urine Negative Negative mg/dL    Urobilinogen Urine <2.0 <2.0 mg/dL    Nitrite Urine Negative Negative    Leukocyte Esterase Urine 75 Sherri/uL (A) Negative    Bacteria Urine Few (A) None Seen /HPF    Mucus Urine Present (A) None Seen /LPF    RBC Urine 2 <=2 /HPF    WBC Urine 12 (H) <=5 /HPF    Squamous Epithelials Urine 1 <=1 /HPF    Hyaline Casts Urine 18 (H) <=2 /LPF   Troponin I (now)   Result Value Ref Range    Troponin I <0.01 0.00 - 0.29 ng/mL   D dimer quantitative   Result Value Ref Range    D-Dimer Quantitative 0.64 (H) 0.00 - 0.50 ug/mL FEU   CBC with platelets and differential   Result Value Ref Range    WBC Count 10.2 4.0 - 11.0 10e3/uL    RBC Count 4.07 3.80 - 5.20 10e6/uL    Hemoglobin 12.7 11.7 - 15.7 g/dL    Hematocrit 38.3 35.0 - 47.0 %    MCV 94 78 - 100 fL    MCH 31.2 26.5 - 33.0 pg    MCHC 33.2 31.5 - 36.5 g/dL    RDW 12.6 10.0 - 15.0 %    Platelet Count 290 150 - 450 10e3/uL    % Neutrophils 72 %    % Lymphocytes 19 %    % Monocytes 7 %    % Eosinophils 1 %    % Basophils 0 %    % Immature Granulocytes 1 %    NRBCs per 100 WBC 0 <1 /100    Absolute Neutrophils 7.4 1.6 - 8.3 10e3/uL    Absolute Lymphocytes 1.9 0.8 - 5.3 10e3/uL    Absolute Monocytes 0.7 0.0 - 1.3 10e3/uL    Absolute Eosinophils 0.1 0.0 - 0.7 10e3/uL    Absolute  Basophils 0.0 0.0 - 0.2 10e3/uL    Absolute Immature Granulocytes 0.1 (H) <=0.0 10e3/uL    Absolute NRBCs 0.0 10e3/uL   Lactic acid whole blood   Result Value Ref Range    Lactic Acid 2.0 0.7 - 2.0 mmol/L   Result Value Ref Range    Procalcitonin 0.02 0.00 - 0.49 ng/mL   Glucose by meter   Result Value Ref Range    GLUCOSE BY METER POCT 103 70 - 125 mg/dL   ECG 12-LEAD WITH MUSE ([facility])   Result Value Ref Range    Systolic Blood Pressure 163 mmHg    Diastolic Blood Pressure 77 mmHg    Ventricular Rate 79 BPM    Atrial Rate 79 BPM    IL Interval 178 ms    QRS Duration 88 ms     ms    QTc 488 ms    P Axis 58 degrees    R AXIS 14 degrees    T Axis 42 degrees    Interpretation ECG       Sinus rhythm  Prolonged QT  Abnormal ECG  When compared with ECG of 14-JAN-2021 17:26,  Criteria for Septal infarct are no longer Present  Confirmed by SEE ED PROVIDER NOTE FOR, ECG INTERPRETATION ([id number]),  MORTEZA ENRIQUEZ ([id number]) on 7/17/2021 5:49:03 PM     ECG 12-LEAD WITH MUSE ([facility])   Result Value Ref Range    Systolic Blood Pressure 162 mmHg    Diastolic Blood Pressure 74 mmHg    Ventricular Rate 86 BPM    Atrial Rate 86 BPM    IL Interval 186 ms    QRS Duration 80 ms     ms    QTc 500 ms    P Axis 61 degrees    R AXIS 30 degrees    T Axis 62 degrees    Interpretation ECG       Sinus rhythm  Prolonged QT  Abnormal ECG  When compared with ECG of 17-JUL-2021 17:34,  No significant change was found  Confirmed by SEE ED PROVIDER NOTE FOR, ECG INTERPRETATION ([id number]),  SABRINA SPANN ([id number]) on 7/17/2021 7:17:49 PM       CT Abdomen Pelvis w Contrast    Result Date: 7/17/2021  EXAM: CT CHEST PULMONARY EMBOLISM W CONTRAST, CT ABDOMEN PELVIS W CONTRAST LOCATION: [facility] DATE/TIME: 7/17/2021 7:12 PM INDICATION: Two episodes of syncope. Nausea. COMPARISON: None. TECHNIQUE: CT chest pulmonary angiogram during arterial phase injection of IV contrast. Multiplanar reformats and MIP reconstructions were performed.  CT abdomen and pelvis with IV contrast material. Dose reduction techniques were used. CONTRAST: Isovue 370 100 mL. FINDINGS: ANGIOGRAM CHEST: Pulmonary arteries are normal caliber and negative for pulmonary emboli. Thoracic aorta is negative for dissection. No CT evidence of right heart strain. LUNGS AND PLEURA: Subpleural infiltrate or scarring in the right middle lobe and lingula. Linear subpleural scar in the medial aspect of the right upper lobe. MEDIASTINUM/AXILLAE: Small to moderate sized esophageal hiatal hernia. CORONARY ARTERY CALCIFICATION: Moderate. ABDOMEN: Fatty liver. Spleen, pancreas, adrenal glands, and the kidneys are negative. No appendicitis. PELVIS: Hysterectomy. The rectum is distended with stool. MUSCULOSKELETAL: Minimal lumbar degenerative change.     IMPRESSION: 1.  No pulmonary embolism. 2.  Few subpleural regions of scarring in the lungs. 3.  Small to moderate size esophageal hiatal hernia. 4.  Fatty liver. 5.  The rectum is distended with stool. 6.  Hysterectomy. 7.  Coronary artery calcification.    CT Chest Pulmonary Embolism w Contrast    Result Date: 7/17/2021  EXAM: CT CHEST PULMONARY EMBOLISM W CONTRAST, CT ABDOMEN PELVIS W CONTRAST LOCATION: [facility] DATE/TIME: 7/17/2021 7:12 PM INDICATION: Two episodes of syncope. Nausea. COMPARISON: None. TECHNIQUE: CT chest pulmonary angiogram during arterial phase injection of IV contrast. Multiplanar reformats and MIP reconstructions were performed. CT abdomen and pelvis with IV contrast material. Dose reduction techniques were used. CONTRAST: Isovue 370 100 mL. FINDINGS: ANGIOGRAM CHEST: Pulmonary arteries are normal caliber and negative for pulmonary emboli. Thoracic aorta is negative for dissection. No CT evidence of right heart strain. LUNGS AND PLEURA: Subpleural infiltrate or scarring in the right middle lobe and lingula. Linear subpleural scar in the medial aspect of the right upper lobe. MEDIASTINUM/AXILLAE: Small to  moderate sized esophageal hiatal hernia. CORONARY ARTERY CALCIFICATION: Moderate. ABDOMEN: Fatty liver. Spleen, pancreas, adrenal glands, and the kidneys are negative. No appendicitis. PELVIS: Hysterectomy. The rectum is distended with stool. MUSCULOSKELETAL: Minimal lumbar degenerative change.     IMPRESSION: 1.  No pulmonary embolism. 2.  Few subpleural regions of scarring in the lungs. 3.  Small to moderate size esophageal hiatal hernia. 4.  Fatty liver. 5.  The rectum is distended with stool. 6.  Hysterectomy. 7.  Coronary artery calcification.    Head CT w/o contrast    Result Date: 7/17/2021  EXAM: CT HEAD W/O CONTRAST LOCATION: [facility] DATE/TIME: 7/17/2021 7:08 PM INDICATION: Syncope, recurrent COMPARISON: None. TECHNIQUE: Routine CT Head without IV contrast. Multiplanar reformats. Dose reduction techniques were used. FINDINGS: INTRACRANIAL CONTENTS: No evidence of acute intracranial hemorrhage or mass effect. Scattered foci of decreased attenuation within the cerebral hemispheric white matter which are nonspecific, though most commonly ascribed to chronic small vessel ischemic  disease. Calcified meningioma overlying the left parafalcine frontal lobe measuring 9 mm. The ventricles and sulci are prominent consistent with mild brain parenchymal volume loss. Gray-white matter differentiation is maintained. The basilar cisterns are patent. VISUALIZED ORBITS/SINUSES/MASTOIDS: The globes are unremarkable. The partially imaged paranasal sinuses and mastoid air cells are unremarkable. BONES/SOFT TISSUES: The visualized skull base and calvarium are unremarkable.     IMPRESSION:  1.  No evidence of acute intracranial hemorrhage or mass effect. 2.  Calcified meningioma overlying the left parafalcine frontal lobe measuring 9 mm. 3.  Mild nonspecific white matter changes. 4.  Mild brain parenchymal volume loss.       New Prescriptions    No medications on file       Final disposition will be per the  depending diagnostic studies and patient's clinical trajectory.    I, Marlin Tran, am serving as a scribe to document services personally performed by Dr. Nayan Victoria, based on my observation and the provider's statements to me. I, Nayan Victoria MD attest that Marlin Tran is acting in a scribe capacity, has observed my performance of the services and has documented them in accordance with my direction.     Nayan Victoria MD  07/17/21 2023

## 2021-07-18 NOTE — CONSULTS
Care Management Initial Consult    General Information  Assessment completed with:  ,         Primary Care Provider verified and updated as needed:     Readmission within the last 30 days:           Advance Care Planning:            Communication Assessment  Patient's communication style: spoken language (English or Bilingual)    Hearing Difficulty or Deaf: no   Wear Glasses or Blind: no    Cognitive  Cognitive/Neuro/Behavioral:                        Living Environment:   People in home: spouse     Current living Arrangements: apartment      Able to return to prior arrangements:         Family/Social Support:  Care provided by:    Provides care for:                  Description of Support System:           Current Resources:   Patient receiving home care services:       Community Resources:    Equipment currently used at home: none  Supplies currently used at home:      Employment/Financial:  Employment Status:          Financial Concerns:             Lifestyle & Psychosocial Needs:  Social Determinants of Health     Tobacco Use:      Smoking Tobacco Use:      Smokeless Tobacco Use:    Alcohol Use:      Frequency of Alcohol Consumption:      Average Number of Drinks:      Frequency of Binge Drinking:    Financial Resource Strain:      Difficulty of Paying Living Expenses:    Food Insecurity:      Worried About Running Out of Food in the Last Year:      Ran Out of Food in the Last Year:    Transportation Needs:      Lack of Transportation (Medical):      Lack of Transportation (Non-Medical):    Physical Activity:      Days of Exercise per Week:      Minutes of Exercise per Session:    Stress:      Feeling of Stress :    Social Connections:      Frequency of Communication with Friends and Family:      Frequency of Social Gatherings with Friends and Family:      Attends Jew Services:      Active Member of Clubs or Organizations:      Attends Club or Organization Meetings:      Marital Status:    Intimate Partner  Violence:      Fear of Current or Ex-Partner:      Emotionally Abused:      Physically Abused:      Sexually Abused:    Depression:      PHQ-2 Score:    Housing Stability:      Unable to Pay for Housing in the Last Year:      Number of Places Lived in the Last Year:      Unstable Housing in the Last Year:        Functional Status:  Prior to admission patient needed assistance:              Mental Health Status:          Chemical Dependency Status:                Values/Beliefs:  Spiritual, Cultural Beliefs, Holiness Practices, Values that affect care:                 Additional Information:  AYDEN assessed. Assessment completed with pt, her daughter at bedside. Pt resides in a senior living apt with her spouse and is independent/driving at baseline. She denies use of medical equipment or supplies. Copy of HCD provided per pt request. NEELY Notice provided. Plan to return home at discharge with family to transport. No anticipated service needs.    BLAKE Clarke

## 2021-07-18 NOTE — ED NOTES
Pt was outside by a pool for only approximately 30 minutes.  Per daughter-in-law she felt sick and then had an apparent syncopal episode.  They moved her out of the sun and she had urinated on herself.  Family called 911 as she has done this before and dx'd with vaso-vagal.  In ER pt complains of nausea and was given zofran.  She had a rectal temp of 95.2 degrees so a gómez hugger was placed.  She felt like she had to have a BM and was placed on bedpan, but was still unable.  She was given more Zofran as her nausea was still not gone.

## 2021-07-18 NOTE — PLAN OF CARE
PRIMARY DIAGNOSIS: VERTIGO    OUTPATIENT/OBSERVATION GOALS TO BE MET BEFORE DISCHARGE  1. Orthostatic performed: Yes:          Lying Orthostatic BP: 146/67         Sitting Orthostatic BP: 144/67         Standing Orthostatic BP: 156/71     2. Completion of appropriate imaging: Yes    3. Tolerating PO medications: Yes    4. Return to near baseline physical activity: Yes    5. Cleared for discharge by consultants (if involved): N/A    Discharge Planner Nurse   Safe discharge environment identified: Yes  Barriers to discharge: Yes; Urine cultures pending, IV antibiotics, PT eval.        Entered by: Lesa Corrigan 07/18/2021 4:00 PM     Please review provider order for any additional goals.   Nurse to notify provider when observation goals have been met and patient is ready for discharge.

## 2021-07-18 NOTE — PROGRESS NOTES
[facility] [place] MEDICINE PROGRESS NOTE      Code Status: Full Code       Identification/Summary:   Anabelle Cameron is a 80 year old female with a PMH of GERD, depression, pancreatitis and history of vasovagal syncope.  7/17/2021 admitted for syncope.  ED work-up negative except for abnormal UA and EKG shows prolonged QT of 500 ms.  7/18 started IV ceftriaxone.  Repeat EKG normal QTC.  Echocardiogram normal.  Follow urine culture.  Hopeful discharge 7/19.     Assessment and Plan:    -Syncopal event  History of vasovagal events.  Patient felt that this was similar to previous events but happened much more rapidly.  Has had urinary incontinence with previous events.  Feels like it is occurring a little bit more frequently.  No events on telemetry.  Echocardiogram normal.  Could follow-up with neurology as an outpatient.  -QT prolongation  7/17 EKG shows a QTC of 500 ms.  Troponins negative.  No events on telemetry.  7/18 repeat EKG shows QTC of 488.  QT prolongation resolved.  -Urinary incontinence  -Abnormal urinalysis  7/17 UA did show increased leukocytes, mucus WBCs.  7/18 ordered ceftriaxone 1 g IV.  Follow urine culture.  -Constipation  Discussed constipation management with patient.  Will order scheduled senna docusate.  Recommend keeping a stool and diet diary.  -GERD  Ordered Protonix 40 twice daily.  -Skin rash  Prior to admission was seen by primary care provider and empirically treated with Valtrex 1000 mg 3 times daily for possible shingles.  Reviewing rash does not appear to be shingles.  Okay to continue Valtrex at this time.    COVID-19 PCR negative from 7/17/2021  Noted.  Standard precautions.  Anticoagulation   Low Risk/Ambulatory with no VTE prophylaxis indicated  Hold on medications at this time due to risk of falls.  Guillory:Not present  Fluids: Normal saline at 75 mL/h.  Continue gentle IV hydration.  Pain meds: Tylenol  Therapy: PT consulted  Current Diet  Orders Placed This  Encounter      Regular Diet Adult    Supplements  None    Barriers to Discharge: Intravenous antibiotics, urine culture, PT eval    Disposition: Anticipate discharge 7/19    Interval History/Subjective:  Patient doing okay.  No lightheadedness or dizziness.  No events on telemetry monitoring.  No chest pain.  No shortness of breath.  Patient notes she has had vasovagal syncope in the past and frequently will be associated with urinary incontinence.  Does have chronic urinary urgency.  Had noted some burning with urination prior to her syncopal events.  Notes that she does have frequent bladder infections.  Family present and supportive.  Questions answered to verbalized satisfaction.    Physical Exam/Objective:  Temp:  [96.2  F (35.7  C)-98.2  F (36.8  C)] 98.1  F (36.7  C)  Pulse:  [75-95] 75  Resp:  [6-31] 16  BP: (130-172)/(65-93) 159/70  SpO2:  [82 %-100 %] 96 %  Wt Readings from Last 4 Encounters:   07/18/21 71.2 kg (157 lb)   11/17/12 70.8 kg (156 lb)   07/28/12 72.6 kg (160 lb)     Body mass index is 25.34 kg/m .    Constitutional: awake, alert, cooperative, no apparent distress, and appears stated age  ENT: Normocephalic, without obvious abnormality, atraumatic, external ears without lesions, oral pharynx with moist mucous membranes, tonsils without erythema or exudates, gums normal and good dentition.  Respiratory: No increased work of breathing, good air exchange, clear to auscultation bilaterally, no crackles or wheezing  Cardiovascular: Normal apical impulse, regular rate and rhythm, normal S1 and S2, no S3 or S4, and no murmur noted  GI: No scars, normal bowel sounds, soft, non-distended, non-tender, no masses palpated, no hepatosplenomegally  Skin: normal skin color, texture, turgor, no redness, warmth, or swelling, and no rashes  Musculoskeletal: There is no redness, warmth, or swelling of the joints.  Motor strength is 5 out of 5 all extremities bilaterally.  Tone is normal. no lower extremity  pitting edema present  Neurologic: Awake, alert, oriented to name, place and time.  Cranial nerves II-XII are grossly intact.  Motor is 5 out of 5 bilaterally.  Sensory is intact.  Neuropsychiatric: General: normal, calm and normal eye contact Level of consciousness: alert / normal Affect: normal Orientation: oriented to self, place, time and situation Memory and insight: normal, memory for past and recent events intact and thought process normal      Medications:   Personally Reviewed.  Medications     sodium chloride 75 mL/hr at 07/17/21 2306       cefTRIAXone  1 g Intravenous Q24H     pantoprazole  40 mg Oral BID     senna-docusate  1 tablet Oral BID     valACYclovir  1,000 mg Oral TID       Data reviewed today: I personally reviewed all new medications, labs, imaging/diagnostics reports over the past 24 hours. Pertinent findings include:    Imaging:   Recent Results (from the past 24 hour(s))   CT Chest Pulmonary Embolism w Contrast    Narrative    EXAM: CT CHEST PULMONARY EMBOLISM W CONTRAST, CT ABDOMEN PELVIS W CONTRAST  LOCATION: [facility]  DATE/TIME: 7/17/2021 7:12 PM    INDICATION: Two episodes of syncope. Nausea.  COMPARISON: None.  TECHNIQUE: CT chest pulmonary angiogram during arterial phase injection of IV contrast. Multiplanar reformats and MIP reconstructions were performed. CT abdomen and pelvis with IV contrast material. Dose reduction techniques were used.   CONTRAST: Isovue 370 100 mL.    FINDINGS:  ANGIOGRAM CHEST: Pulmonary arteries are normal caliber and negative for pulmonary emboli. Thoracic aorta is negative for dissection. No CT evidence of right heart strain.    LUNGS AND PLEURA: Subpleural infiltrate or scarring in the right middle lobe and lingula. Linear subpleural scar in the medial aspect of the right upper lobe.    MEDIASTINUM/AXILLAE: Small to moderate sized esophageal hiatal hernia.    CORONARY ARTERY CALCIFICATION: Moderate.    ABDOMEN: Fatty liver. Spleen,  pancreas, adrenal glands, and the kidneys are negative. No appendicitis.    PELVIS: Hysterectomy. The rectum is distended with stool.    MUSCULOSKELETAL: Minimal lumbar degenerative change.      Impression    IMPRESSION:  1.  No pulmonary embolism.    2.  Few subpleural regions of scarring in the lungs.    3.  Small to moderate size esophageal hiatal hernia.    4.  Fatty liver.    5.  The rectum is distended with stool.    6.  Hysterectomy.    7.  Coronary artery calcification.   CT Abdomen Pelvis w Contrast    Narrative    EXAM: CT CHEST PULMONARY EMBOLISM W CONTRAST, CT ABDOMEN PELVIS W CONTRAST  LOCATION: [facility]  DATE/TIME: 7/17/2021 7:12 PM    INDICATION: Two episodes of syncope. Nausea.  COMPARISON: None.  TECHNIQUE: CT chest pulmonary angiogram during arterial phase injection of IV contrast. Multiplanar reformats and MIP reconstructions were performed. CT abdomen and pelvis with IV contrast material. Dose reduction techniques were used.   CONTRAST: Isovue 370 100 mL.    FINDINGS:  ANGIOGRAM CHEST: Pulmonary arteries are normal caliber and negative for pulmonary emboli. Thoracic aorta is negative for dissection. No CT evidence of right heart strain.    LUNGS AND PLEURA: Subpleural infiltrate or scarring in the right middle lobe and lingula. Linear subpleural scar in the medial aspect of the right upper lobe.    MEDIASTINUM/AXILLAE: Small to moderate sized esophageal hiatal hernia.    CORONARY ARTERY CALCIFICATION: Moderate.    ABDOMEN: Fatty liver. Spleen, pancreas, adrenal glands, and the kidneys are negative. No appendicitis.    PELVIS: Hysterectomy. The rectum is distended with stool.    MUSCULOSKELETAL: Minimal lumbar degenerative change.      Impression    IMPRESSION:  1.  No pulmonary embolism.    2.  Few subpleural regions of scarring in the lungs.    3.  Small to moderate size esophageal hiatal hernia.    4.  Fatty liver.    5.  The rectum is distended with stool.    6.   Hysterectomy.    7.  Coronary artery calcification.   Head CT w/o contrast    Narrative    EXAM: CT HEAD W/O CONTRAST  LOCATION: [facility]  DATE/TIME: 7/17/2021 7:08 PM    INDICATION: Syncope, recurrent  COMPARISON: None.  TECHNIQUE: Routine CT Head without IV contrast. Multiplanar reformats. Dose reduction techniques were used.    FINDINGS:  INTRACRANIAL CONTENTS: No evidence of acute intracranial hemorrhage or mass effect. Scattered foci of decreased attenuation within the cerebral hemispheric white matter which are nonspecific, though most commonly ascribed to chronic small vessel ischemic   disease. Calcified meningioma overlying the left parafalcine frontal lobe measuring 9 mm. The ventricles and sulci are prominent consistent with mild brain parenchymal volume loss. Gray-white matter differentiation is maintained. The basilar cisterns   are patent.    VISUALIZED ORBITS/SINUSES/MASTOIDS: The globes are unremarkable. The partially imaged paranasal sinuses and mastoid air cells are unremarkable.     BONES/SOFT TISSUES: The visualized skull base and calvarium are unremarkable.      Impression    IMPRESSION:    1.  No evidence of acute intracranial hemorrhage or mass effect.  2.  Calcified meningioma overlying the left parafalcine frontal lobe measuring 9 mm.   3.  Mild nonspecific white matter changes.  4.  Mild brain parenchymal volume loss.       Labs:  Head CT w/o contrast   Final Result   IMPRESSION:     1.  No evidence of acute intracranial hemorrhage or mass effect.   2.  Calcified meningioma overlying the left parafalcine frontal lobe measuring 9 mm.    3.  Mild nonspecific white matter changes.   4.  Mild brain parenchymal volume loss.      CT Abdomen Pelvis w Contrast   Final Result   IMPRESSION:   1.  No pulmonary embolism.      2.  Few subpleural regions of scarring in the lungs.      3.  Small to moderate size esophageal hiatal hernia.      4.  Fatty liver.      5.  The rectum is  distended with stool.      6.  Hysterectomy.      7.  Coronary artery calcification.      CT Chest Pulmonary Embolism w Contrast   Final Result   IMPRESSION:   1.  No pulmonary embolism.      2.  Few subpleural regions of scarring in the lungs.      3.  Small to moderate size esophageal hiatal hernia.      4.  Fatty liver.      5.  The rectum is distended with stool.      6.  Hysterectomy.      7.  Coronary artery calcification.      Echocardiogram Complete    (Results Pending)     Recent Results (from the past 24 hour(s))   ECG 12-LEAD WITH MUSE ([facility])    Collection Time: 07/17/21  5:34 PM   Result Value Ref Range    Systolic Blood Pressure 163 mmHg    Diastolic Blood Pressure 77 mmHg    Ventricular Rate 79 BPM    Atrial Rate 79 BPM    DC Interval 178 ms    QRS Duration 88 ms     ms    QTc 488 ms    P Axis 58 degrees    R AXIS 14 degrees    T Axis 42 degrees    Interpretation ECG       Sinus rhythm  Prolonged QT  Abnormal ECG  When compared with ECG of 14-JAN-2021 17:26,  Criteria for Septal infarct are no longer Present  Confirmed by SEE ED PROVIDER NOTE FOR, ECG INTERPRETATION ([id number]),  MORTEZA ENRIQUEZ ([id number]) on 7/17/2021 5:49:03 PM     Glucose by meter    Collection Time: 07/17/21  6:06 PM   Result Value Ref Range    GLUCOSE BY METER POCT 103 70 - 125 mg/dL   Magnesium    Collection Time: 07/17/21  6:21 PM   Result Value Ref Range    Magnesium 1.8 1.8 - 2.6 mg/dL   Comprehensive metabolic panel    Collection Time: 07/17/21  6:21 PM   Result Value Ref Range    Sodium 138 136 - 145 mmol/L    Potassium 3.7 3.5 - 5.0 mmol/L    Chloride 106 98 - 107 mmol/L    Carbon Dioxide (CO2) 22 22 - 31 mmol/L    Anion Gap 10 5 - 18 mmol/L    Urea Nitrogen 20 8 - 28 mg/dL    Creatinine 0.75 0.60 - 1.10 mg/dL    Calcium 9.3 8.5 - 10.5 mg/dL    Glucose 121 70 - 125 mg/dL    Alkaline Phosphatase 116 45 - 120 U/L    AST 23 0 - 40 U/L    ALT 18 0 - 45 U/L    Protein Total 7.0 6.0 - 8.0 g/dL    Albumin 4.0 3.5 - 5.0 g/dL     Bilirubin Total 0.6 0.0 - 1.0 mg/dL    GFR Estimate 74 >60 mL/min/1.73m2   Troponin I (now)    Collection Time: 07/17/21  6:21 PM   Result Value Ref Range    Troponin I <0.01 0.00 - 0.29 ng/mL   D dimer quantitative    Collection Time: 07/17/21  6:21 PM   Result Value Ref Range    D-Dimer Quantitative 0.64 (H) 0.00 - 0.50 ug/mL FEU   CBC with platelets and differential    Collection Time: 07/17/21  6:21 PM   Result Value Ref Range    WBC Count 10.2 4.0 - 11.0 10e3/uL    RBC Count 4.07 3.80 - 5.20 10e6/uL    Hemoglobin 12.7 11.7 - 15.7 g/dL    Hematocrit 38.3 35.0 - 47.0 %    MCV 94 78 - 100 fL    MCH 31.2 26.5 - 33.0 pg    MCHC 33.2 31.5 - 36.5 g/dL    RDW 12.6 10.0 - 15.0 %    Platelet Count 290 150 - 450 10e3/uL    % Neutrophils 72 %    % Lymphocytes 19 %    % Monocytes 7 %    % Eosinophils 1 %    % Basophils 0 %    % Immature Granulocytes 1 %    NRBCs per 100 WBC 0 <1 /100    Absolute Neutrophils 7.4 1.6 - 8.3 10e3/uL    Absolute Lymphocytes 1.9 0.8 - 5.3 10e3/uL    Absolute Monocytes 0.7 0.0 - 1.3 10e3/uL    Absolute Eosinophils 0.1 0.0 - 0.7 10e3/uL    Absolute Basophils 0.0 0.0 - 0.2 10e3/uL    Absolute Immature Granulocytes 0.1 (H) <=0.0 10e3/uL    Absolute NRBCs 0.0 10e3/uL   Lactic acid whole blood    Collection Time: 07/17/21  6:21 PM   Result Value Ref Range    Lactic Acid 2.0 0.7 - 2.0 mmol/L   Procalcitonin    Collection Time: 07/17/21  6:29 PM   Result Value Ref Range    Procalcitonin 0.02 0.00 - 0.49 ng/mL   Prolactin    Collection Time: 07/17/21  6:29 PM   Result Value Ref Range    Prolactin 26.8 (H) 0.0 - 20.0 ng/mL   ECG 12-LEAD WITH MUSE ([facility])    Collection Time: 07/17/21  6:54 PM   Result Value Ref Range    Systolic Blood Pressure 162 mmHg    Diastolic Blood Pressure 74 mmHg    Ventricular Rate 86 BPM    Atrial Rate 86 BPM    AZ Interval 186 ms    QRS Duration 80 ms     ms    QTc 500 ms    P Axis 61 degrees    R AXIS 30 degrees    T Axis 62 degrees    Interpretation ECG       Sinus  rhythm  Prolonged QT  Abnormal ECG  When compared with ECG of 17-JUL-2021 17:34,  No significant change was found  Confirmed by SEE ED PROVIDER NOTE FOR, ECG INTERPRETATION ([id number]),  SABRINA SPANN ([id number]) on 7/17/2021 7:17:49 PM     UA with Microscopic reflex to Culture    Collection Time: 07/17/21  7:00 PM    Specimen: Urine, Midstream   Result Value Ref Range    Color Urine Light Yellow Colorless, Straw, Light Yellow, Yellow    Appearance Urine Clear Clear    Glucose Urine Negative Negative mg/dL    Bilirubin Urine Negative Negative    Ketones Urine Trace (A) Negative mg/dL    Specific Gravity Urine 1.014 1.001 - 1.030    Blood Urine Negative Negative    pH Urine 7.0 5.0 - 7.0    Protein Albumin Urine Negative Negative mg/dL    Urobilinogen Urine <2.0 <2.0 mg/dL    Nitrite Urine Negative Negative    Leukocyte Esterase Urine 75 Sherri/uL (A) Negative    Bacteria Urine Few (A) None Seen /HPF    Mucus Urine Present (A) None Seen /LPF    RBC Urine 2 <=2 /HPF    WBC Urine 12 (H) <=5 /HPF    Squamous Epithelials Urine 1 <=1 /HPF    Hyaline Casts Urine 18 (H) <=2 /LPF   SARS-COV2 (COVID-19) Virus RT-PCR    Collection Time: 07/17/21  9:31 PM    Specimen: Nasopharyngeal; Swab   Result Value Ref Range    SARS CoV2 PCR Negative Negative   CBC with platelets    Collection Time: 07/18/21  5:51 AM   Result Value Ref Range    WBC Count 9.1 4.0 - 11.0 10e3/uL    RBC Count 3.76 (L) 3.80 - 5.20 10e6/uL    Hemoglobin 11.8 11.7 - 15.7 g/dL    Hematocrit 35.5 35.0 - 47.0 %    MCV 94 78 - 100 fL    MCH 31.4 26.5 - 33.0 pg    MCHC 33.2 31.5 - 36.5 g/dL    RDW 12.5 10.0 - 15.0 %    Platelet Count 279 150 - 450 10e3/uL   Comprehensive metabolic panel    Collection Time: 07/18/21  5:51 AM   Result Value Ref Range    Sodium 135 (L) 136 - 145 mmol/L    Potassium 3.8 3.5 - 5.0 mmol/L    Chloride 106 98 - 107 mmol/L    Carbon Dioxide (CO2) 24 22 - 31 mmol/L    Anion Gap 5 5 - 18 mmol/L    Urea Nitrogen 16 8 - 28 mg/dL    Creatinine 0.63 0.60  - 1.10 mg/dL    Calcium 8.6 8.5 - 10.5 mg/dL    Glucose 97 70 - 125 mg/dL    Alkaline Phosphatase 100 45 - 120 U/L    AST 19 0 - 40 U/L    ALT 14 0 - 45 U/L    Protein Total 6.0 6.0 - 8.0 g/dL    Albumin 3.4 (L) 3.5 - 5.0 g/dL    Bilirubin Total 0.6 0.0 - 1.0 mg/dL    GFR Estimate 84 >60 mL/min/1.73m2   Troponin I    Collection Time: 21  5:51 AM   Result Value Ref Range    Troponin I <0.01 0.00 - 0.29 ng/mL   ECG 12-LEAD WITH MUSE ([facility])    Collection Time: 21  6:22 AM   Result Value Ref Range    Systolic Blood Pressure  mmHg    Diastolic Blood Pressure  mmHg    Ventricular Rate 74 BPM    Atrial Rate 74 BPM    OH Interval 180 ms    QRS Duration 72 ms     ms    QTc 468 ms    P Axis 58 degrees    R AXIS 18 degrees    T Axis 29 degrees    Interpretation ECG       Sinus rhythm  Normal ECG  When compared with ECG of 2021 18:54,  No significant change was found         Pending Labs:  Unresulted Labs Ordered in the Past 30 Days of this Admission     Date and Time Order Name Status Description    2021  7:37 PM Urine Culture In process     2021  6:09 PM Blood Culture Arm, Right In process     2021  6:09 PM Blood Culture Peripheral Blood In process         Echocardiogram Complete  Order: [id number]  Status:  Edited Result - FINAL   Visible to patient:  Yes (MyChart) Next appt:  None   0 Result Notes  Details    Reading Physician Reading Date Result Priority   Stewart Castro MD  [phone] 2021       Narrative & Impression  [id number]  [id number]  [id number]  [id number]^CANDICE^[place] [address]     Name: SHI LERMA  MRN: [id number]  : 1940  Study Date: 2021 11:06 AM  Age: 80 yrs  Gender: Female  Patient Location: [facility]  Reason For Study: Syncope  Ordering Physician: FERNANDO HARVEY  Performed By: JOSUE     BSA: 1.8 m2  Height: 66 in  Weight: 157 lb  HR:  80  ______________________________________________________________________________  Procedure  Definity (NDC #45267-083) given intravenously.  ______________________________________________________________________________  Interpretation Summary     1. Normal left ventricular size and systolic performance with a visually  estimated ejection fraction of 65%.  2. No significant valvular heart disease is identified on this study.  3. Normal right ventricular size and systolic performance.            Qamar Acosta MD  [facility]  Phone: #[phone]

## 2021-07-18 NOTE — PLAN OF CARE
Problem: Adult Inpatient Plan of Care  Goal: Optimal Comfort and Wellbeing  Outcome: No Change     Problem: Syncope  Goal: Absence of Syncopal Symptoms  Outcome: No Change   Pt admitted from ED with sycope.  Pt fainted X 2 today while outside.  Pt c/o HA on admit.  IV fluids started and medicated with PO Tylenol. Tele monitor applied.

## 2021-07-18 NOTE — PROGRESS NOTES
07/18/21 1400   Quick Adds   Type of Visit Initial PT Evaluation   Living Environment   People in home spouse   Current Living Arrangements independent living facility   Home Accessibility no concerns   Transportation Anticipated family or friend will provide   Disability/Function   Hearing Difficulty or Deaf no   Walking or Climbing Stairs Difficulty no   Dressing/Bathing Difficulty no   Toileting issues no   Doing Errands Independently Difficulty (such as shopping) yes   Fall history within last six months no   General Information   Onset of Illness/Injury or Date of Surgery 07/17/21   Referring Physician Qamar Garay   Patient/Family Therapy Goals Statement (PT) return home   Pertinent History of Current Problem (include personal factors and/or comorbidities that impact the POC) syncope   Cognition   Orientation Status (Cognition) oriented x 4   Pain Assessment   Patient Currently in Pain No   Range of Motion (ROM)   ROM Comment WFL   Strength   Strength Comments WFL   Bed Mobility   Supine-Sit Chandler (Bed Mobility) independent   Sit-Supine [place] (Bed Mobility) independent   Transfers   Transfers No deficits identified   Sit-Stand Transfer   Sit-Stand Chandler (Transfers) independent   Assistive Device (Sit-Stand Transfers)   (none)   Gait/Stairs (Locomotion)   Chandler Level (Gait) supervision   Assistive Device (Gait)   (none)   Distance in Feet (Required for LE Total Joints) 700   Pattern (Gait) step-through   Clinical Impression   Criteria for Skilled Therapeutic Intervention no problems identified which require skilled intervention   Clinical Presentation Stable/Uncomplicated   Clinical Decision Making (Complexity) low complexity   Therapy Frequency (PT) One time eval and treatment only   Anticipated Equipment Needs at Discharge (PT)   (none)   Risk & Benefits of therapy have been explained care plan/treatment goals reviewed;participants voiced agreement with care  plan;participants included;patient   PT Discharge Planning    PT Discharge Recommendation (DC Rec) home with assist   PT Rationale for DC Rec patient independent with functional mobility.Safe to retur home when med ready.   Total Evaluation Time   Total Evaluation Time (Minutes) 20

## 2021-07-18 NOTE — PHARMACY-ADMISSION MEDICATION HISTORY
Pharmacy Note - Admission Medication History    Pertinent Provider Information:      ______________________________________________________________________    Prior To Admission (PTA) med list completed and updated in EMR.       Prior to Admission Medications   Prescriptions Last Dose Informant Patient Reported? Taking?   Fexofenadine HCl (ALLEGRA PO) Past Month at Unknown time  Yes Yes   Sig: Take 60 mg by mouth daily as needed    multivitamin, therapeutic with minerals (THERA-VIT-M) TABS 7/16/2021 at Unknown time  Yes Yes   Sig: Take 1 tablet by mouth daily.     omeprazole (PRILOSEC) 20 MG DR capsule 7/16/2021 at Unknown time  Yes Yes   Sig: Take 40 mg by mouth daily    valACYclovir (VALTREX) 1000 mg tablet 7/17/2021 at 2/3  Yes Yes   Sig: Take 1 g by mouth 3 times daily Started 7/15/2021 x 7 days      Facility-Administered Medications: None       Information source(s): Patient, Family member and CareEverywhere/[facility]  Method of interview communication: in-person    Summary of Changes to PTA Med List  New: Omeprazole  Discontinued: Estrogen, ondansetron, pantoprazole, sertraline  Changed: fexofenadine to prn    Patient was asked about OTC/herbal products specifically.  PTA med list reflects this.    In the past week, patient estimated taking medication this percent of the time:  greater than 90%.    Allergies were reviewed, assessed, and updated with the patient.      Patient does not use any multi-dose medications prior to admission.    The information provided in this note is only as accurate as the sources available at the time of the update(s).    Thank you for the opportunity to participate in the care of this patient.    Jozef Cody RPH  7/17/2021 9:32 PM

## 2021-07-18 NOTE — H&P
[facility] MEDICINE ADMISSION HISTORY AND PHYSICAL       Assessment & Plan      1. Syncope - Has prolonged QTc of 500 -- concerning for rhythm related issues. Dont see any meds to cause this, other than she is on sertraline.    2. Urinary incontinence after syncope -- r/o Seizure. She has no prior history. Head CT was negative    3. Did not complain of abdominal pain to me - but abdominal CT was taken in ED and was OK.    4. Repeat UA, suspicious for UTI     Plans  1. IVF, ECHO, may repeat EKG or check tele strips for prolonged QT  2. She may need an outpatient event monitor  3. Will check for orthostatics, continue IVF  4. AM labs to continue with trop and Hgb checks   5. May get EEG       VTE prophylaxis: SCDs and ambulation, heparin   IV fluids: Per order set   Diet:  Regular   GI prophylaxis: Not indicated at this point, re-assess if needed.   Pain, sleep, and vomiting medications: Per order set  Code Status: Full   COVID test result:  negative   COVID vaccination: completed   Barriers to discharge: admitting clinical condition    Discharge Disposition and goals:  Unable to determine at this point, pending clinical progress and response to treatment. Patient may need transfer to SNF or ACR if unsafe to go home and needed treatment inappropriate at home setting OR may need home health care evaluation if care can be delivered at home settings. Consider referral to care manager/      I certify that inpatient services for greater than two midnights are medically necessary based on presenting medical condition/s listed in the H&P and care plan. Care plan was created based on available information provided, including patient's condition at the time of encounter.   This plan was discussed with patient and/or family members using layman's terms and have agreed to proceed.   At the end of night shift (9PM - 730A), this case will be presented to the AM Hospitalist.    It is  recommended to revise care plan if there is change in condition and/or new clinical information that are not available during my encounter.     All or some of home medication/s were not resumed on admission due to safety reasons or contraindications. Dosing and frequency may also have been modified. Please resume/review them during your visit.     70 minutes of total visit duration and greater than 50% was spent in direct evaluation of patient and coordination of care including discussion of diagnostic test results and recommended treatment. .      Francis Scott MD, MPH, FACP, [facility]  Internal Medicine - Hospitalist        Chief Complaint Passed out      HISTORY     - Met her and she was awake and interactive.  - She said, she was outside and sitting a chair by the pool, covered with umbrella and passed out all of a sudden. She denies presyncopal symptoms. She has had syncopal episode in the past. Felt this time, her syncope was longer. She also said, she was incontinent after. She has no history of seizure.  - She has no abdominal pain, no chest pain, no SOB. No fever or cough.   - In the ED, head CT was negative. Abdominal CT was negative. EKG showed prolonged QTc - 500. Hgb was normal.     - ROS --- No dizziness. No weakness. No CP or SOB. No palpitations. No abdominal pain. No nausea or vomiting. No urinary symptoms. No bleeding symptoms. No weight loss. Rest of 12 point ROS was reviewed and negative.       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 LEFT EYE Occurred at time  of IOL/ERM         Surgical History     Past Surgical History:   Procedure Laterality Date     APPENDECTOMY OPEN       GYN SURGERY       ORTHOPEDIC SURGERY          Family History      History reviewed. No pertinent family history.      Social History      .  Social History     Socioeconomic History     Marital status:      Spouse name: Not on file     Number of children: Not on file     Years of education: Not on file     Highest education level: Not on file   Occupational History     Not on file   Tobacco Use     Smoking status: Not on file   Substance and Sexual Activity     Alcohol use: Yes     Comment: social     Drug use: No     Sexual activity: Not on file   Other Topics Concern     Not on file   Social History Narrative     Not on file     Social Determinants of Health     Financial Resource Strain:      Difficulty of Paying Living Expenses:    Food Insecurity:      Worried About Running Out of Food in the Last Year:      Ran Out of Food in the Last Year:    Transportation Needs:      Lack of Transportation (Medical):      Lack of Transportation (Non-Medical):    Physical Activity:      Days of Exercise per Week:      Minutes of Exercise per Session:    Stress:      Feeling of Stress :    Social Connections:      Frequency of Communication with Friends and Family:      Frequency of Social Gatherings with Friends and Family:      Attends Orthodoxy Services:      Active Member of Clubs or Organizations:      Attends Club or Organization Meetings:      Marital Status:    Intimate Partner Violence:      Fear of Current or Ex-Partner:      Emotionally Abused:      Physically Abused:      Sexually Abused:           Allergies        Allergies   Allergen Reactions     Levofloxacin Other (See Comments), Dizziness and Nausea     dizziness  Patient unable to tolerate side effects       Hydrocodone-Acetaminophen Other (See Comments)     Dizziness, fainting  Other reaction(s): Syncope  Vasovagal episode          Oxycodone-Acetaminophen Other (See Comments)     Dizziness, fainting         Prior to Admission Medications      No current facility-administered medications on file prior to encounter.  valACYclovir (VALTREX) 1000 mg tablet, Take 1 g by mouth  Estrogens Conjugated (PREMARIN PO), Take  by mouth.    Fexofenadine HCl (ALLEGRA PO), Take  by mouth.    multivitamin, therapeutic with minerals (THERA-VIT-M) TABS, Take 1 tablet by mouth daily.    ondansetron (ZOFRAN ODT) 4 MG disintegrating tablet, Take 1 tablet by mouth every 4 hours as needed for nausea for 5 doses.  pantoprazole (PROTONIX) 40 MG EC tablet, pantoprazole 40 mg tablet,delayed release  sertraline (ZOLOFT) 20 MG/ML concentrated solution, Take 50 mg by mouth daily.        Review of Systems     A 12 point comprehensive review of systems was negative except as noted above in HPI.    PHYSICAL EXAMINATION       Vitals      Vitals: BP (!) 152/72   Pulse 86   Temp (!) 96.2  F (35.7  C) (Temporal)   Resp 24   SpO2 100%   BMI= There is no height or weight on file to calculate BMI.      Examination     General Appearance:  Alert, cooperative, no distress  Head:    Normocephalic, without obvious abnormality, atraumatic  EENT:  PERRL, conjunctiva/corneas clear, EOM's intact.   Neck:   Supple, symmetrical, trachea midline, no adenopathy; no NVE  Back:  Symmetric, no curvature, no CVA tenderness  Chest/Lungs: Clear to auscultation bilaterally, respirations unlabored, No tenderness or deformity. No abdominal breathing or use of accessory muscles.   Heart:    Regular rate and rhythm, S1 and S2 normal, no murmur, rub   or gallop  Abdomen: Soft, non-tender, bowel sounds active all four quadrants, not peritoneal on palpation. Not distended  Extremities:  Extremities normal, atraumatic, no swelling   Skin:  Skin color, texture, turgor normal, no rashes or lesion  Neurologic:  Awake and alert, No lateralizing or localizing signs       Pertinent Lab     Results for orders  placed or performed during the hospital encounter of 07/17/21   CT Chest Pulmonary Embolism w Contrast    Impression    IMPRESSION:  1.  No pulmonary embolism.    2.  Few subpleural regions of scarring in the lungs.    3.  Small to moderate size esophageal hiatal hernia.    4.  Fatty liver.    5.  The rectum is distended with stool.    6.  Hysterectomy.    7.  Coronary artery calcification.   Head CT w/o contrast    Impression    IMPRESSION:    1.  No evidence of acute intracranial hemorrhage or mass effect.  2.  Calcified meningioma overlying the left parafalcine frontal lobe measuring 9 mm.   3.  Mild nonspecific white matter changes.  4.  Mild brain parenchymal volume loss.   CT Abdomen Pelvis w Contrast    Impression    IMPRESSION:  1.  No pulmonary embolism.    2.  Few subpleural regions of scarring in the lungs.    3.  Small to moderate size esophageal hiatal hernia.    4.  Fatty liver.    5.  The rectum is distended with stool.    6.  Hysterectomy.    7.  Coronary artery calcification.   Result Value Ref Range    Magnesium 1.8 1.8 - 2.6 mg/dL   Comprehensive metabolic panel   Result Value Ref Range    Sodium 138 136 - 145 mmol/L    Potassium 3.7 3.5 - 5.0 mmol/L    Chloride 106 98 - 107 mmol/L    Carbon Dioxide (CO2) 22 22 - 31 mmol/L    Anion Gap 10 5 - 18 mmol/L    Urea Nitrogen 20 8 - 28 mg/dL    Creatinine 0.75 0.60 - 1.10 mg/dL    Calcium 9.3 8.5 - 10.5 mg/dL    Glucose 121 70 - 125 mg/dL    Alkaline Phosphatase 116 45 - 120 U/L    AST 23 0 - 40 U/L    ALT 18 0 - 45 U/L    Protein Total 7.0 6.0 - 8.0 g/dL    Albumin 4.0 3.5 - 5.0 g/dL    Bilirubin Total 0.6 0.0 - 1.0 mg/dL    GFR Estimate 74 >60 mL/min/1.73m2   UA with Microscopic reflex to Culture    Specimen: Urine, Midstream   Result Value Ref Range    Color Urine Light Yellow Colorless, Straw, Light Yellow, Yellow    Appearance Urine Clear Clear    Glucose Urine Negative Negative mg/dL    Bilirubin Urine Negative Negative    Ketones Urine Trace (A)  Negative mg/dL    Specific Gravity Urine 1.014 1.001 - 1.030    Blood Urine Negative Negative    pH Urine 7.0 5.0 - 7.0    Protein Albumin Urine Negative Negative mg/dL    Urobilinogen Urine <2.0 <2.0 mg/dL    Nitrite Urine Negative Negative    Leukocyte Esterase Urine 75 Sherri/uL (A) Negative    Bacteria Urine Few (A) None Seen /HPF    Mucus Urine Present (A) None Seen /LPF    RBC Urine 2 <=2 /HPF    WBC Urine 12 (H) <=5 /HPF    Squamous Epithelials Urine 1 <=1 /HPF    Hyaline Casts Urine 18 (H) <=2 /LPF   Troponin I (now)   Result Value Ref Range    Troponin I <0.01 0.00 - 0.29 ng/mL   D dimer quantitative   Result Value Ref Range    D-Dimer Quantitative 0.64 (H) 0.00 - 0.50 ug/mL FEU   CBC with platelets and differential   Result Value Ref Range    WBC Count 10.2 4.0 - 11.0 10e3/uL    RBC Count 4.07 3.80 - 5.20 10e6/uL    Hemoglobin 12.7 11.7 - 15.7 g/dL    Hematocrit 38.3 35.0 - 47.0 %    MCV 94 78 - 100 fL    MCH 31.2 26.5 - 33.0 pg    MCHC 33.2 31.5 - 36.5 g/dL    RDW 12.6 10.0 - 15.0 %    Platelet Count 290 150 - 450 10e3/uL    % Neutrophils 72 %    % Lymphocytes 19 %    % Monocytes 7 %    % Eosinophils 1 %    % Basophils 0 %    % Immature Granulocytes 1 %    NRBCs per 100 WBC 0 <1 /100    Absolute Neutrophils 7.4 1.6 - 8.3 10e3/uL    Absolute Lymphocytes 1.9 0.8 - 5.3 10e3/uL    Absolute Monocytes 0.7 0.0 - 1.3 10e3/uL    Absolute Eosinophils 0.1 0.0 - 0.7 10e3/uL    Absolute Basophils 0.0 0.0 - 0.2 10e3/uL    Absolute Immature Granulocytes 0.1 (H) <=0.0 10e3/uL    Absolute NRBCs 0.0 10e3/uL   Lactic acid whole blood   Result Value Ref Range    Lactic Acid 2.0 0.7 - 2.0 mmol/L   Result Value Ref Range    Procalcitonin 0.02 0.00 - 0.49 ng/mL   Glucose by meter   Result Value Ref Range    GLUCOSE BY METER POCT 103 70 - 125 mg/dL   ECG 12-LEAD WITH MUSE ([facility])   Result Value Ref Range    Systolic Blood Pressure 163 mmHg    Diastolic Blood Pressure 77 mmHg    Ventricular Rate 79 BPM    Atrial Rate 79 BPM    KS  Interval 178 ms    QRS Duration 88 ms     ms    QTc 488 ms    P Axis 58 degrees    R AXIS 14 degrees    T Axis 42 degrees    Interpretation ECG       Sinus rhythm  Prolonged QT  Abnormal ECG  When compared with ECG of 14-JAN-2021 17:26,  Criteria for Septal infarct are no longer Present  Confirmed by SEE ED PROVIDER NOTE FOR, ECG INTERPRETATION ([id number]),  MORTEZA ENRIQUEZ ([id number]) on 7/17/2021 5:49:03 PM     ECG 12-LEAD WITH MUSE (L[facility])   Result Value Ref Range    Systolic Blood Pressure 162 mmHg    Diastolic Blood Pressure 74 mmHg    Ventricular Rate 86 BPM    Atrial Rate 86 BPM    PA Interval 186 ms    QRS Duration 80 ms     ms    QTc 500 ms    P Axis 61 degrees    R AXIS 30 degrees    T Axis 62 degrees    Interpretation ECG       Sinus rhythm  Prolonged QT  Abnormal ECG  When compared with ECG of 17-JUL-2021 17:34,  No significant change was found  Confirmed by SEE ED PROVIDER NOTE FOR, ECG INTERPRETATION ([id number]),  SABRINA SPANN ([id number]) on 7/17/2021 7:17:49 PM             Pertinent Radiology

## 2021-07-19 VITALS
RESPIRATION RATE: 16 BRPM | HEART RATE: 72 BPM | HEIGHT: 66 IN | OXYGEN SATURATION: 97 % | TEMPERATURE: 98.1 F | DIASTOLIC BLOOD PRESSURE: 70 MMHG | WEIGHT: 158.1 LBS | BODY MASS INDEX: 25.41 KG/M2 | SYSTOLIC BLOOD PRESSURE: 147 MMHG

## 2021-07-19 PROBLEM — B37.31 YEAST VAGINITIS: Status: ACTIVE | Noted: 2021-07-19

## 2021-07-19 PROBLEM — N30.00 ACUTE CYSTITIS WITHOUT HEMATURIA: Status: ACTIVE | Noted: 2021-07-19

## 2021-07-19 LAB
ALBUMIN UR-MCNC: NEGATIVE MG/DL
APPEARANCE UR: CLEAR
ATRIAL RATE - MUSE: 74 BPM
BACTERIA UR CULT: NO GROWTH
BILIRUB UR QL STRIP: NEGATIVE
COLOR UR AUTO: COLORLESS
DIASTOLIC BLOOD PRESSURE - MUSE: NORMAL MMHG
GLUCOSE UR STRIP-MCNC: NEGATIVE MG/DL
HGB UR QL STRIP: NEGATIVE
INTERPRETATION ECG - MUSE: NORMAL
KETONES UR STRIP-MCNC: NEGATIVE MG/DL
LEUKOCYTE ESTERASE UR QL STRIP: NEGATIVE
MUCOUS THREADS #/AREA URNS LPF: PRESENT /LPF
NITRATE UR QL: NEGATIVE
P AXIS - MUSE: 58 DEGREES
PH UR STRIP: 6 [PH] (ref 5–7)
PR INTERVAL - MUSE: 180 MS
QRS DURATION - MUSE: 72 MS
QT - MUSE: 422 MS
QTC - MUSE: 468 MS
R AXIS - MUSE: 18 DEGREES
RBC URINE: 1 /HPF
SP GR UR STRIP: 1.01 (ref 1–1.03)
SQUAMOUS EPITHELIAL: <1 /HPF
SYSTOLIC BLOOD PRESSURE - MUSE: NORMAL MMHG
T AXIS - MUSE: 29 DEGREES
UROBILINOGEN UR STRIP-MCNC: <2 MG/DL
VENTRICULAR RATE- MUSE: 74 BPM
WBC URINE: 1 /HPF

## 2021-07-19 PROCEDURE — 99217 PR OBSERVATION CARE DISCHARGE: CPT | Mod: GC | Performed by: FAMILY MEDICINE

## 2021-07-19 PROCEDURE — 81001 URINALYSIS AUTO W/SCOPE: CPT | Performed by: INTERNAL MEDICINE

## 2021-07-19 PROCEDURE — 250N000011 HC RX IP 250 OP 636: Performed by: FAMILY MEDICINE

## 2021-07-19 PROCEDURE — 250N000013 HC RX MED GY IP 250 OP 250 PS 637: Performed by: INTERNAL MEDICINE

## 2021-07-19 PROCEDURE — 250N000013 HC RX MED GY IP 250 OP 250 PS 637: Performed by: FAMILY MEDICINE

## 2021-07-19 PROCEDURE — G0378 HOSPITAL OBSERVATION PER HR: HCPCS

## 2021-07-19 PROCEDURE — 96376 TX/PRO/DX INJ SAME DRUG ADON: CPT

## 2021-07-19 RX ORDER — CEFDINIR 300 MG/1
300 CAPSULE ORAL 2 TIMES DAILY
Qty: 6 CAPSULE | Refills: 0 | Status: SHIPPED | OUTPATIENT
Start: 2021-07-20 | End: 2021-07-23

## 2021-07-19 RX ORDER — SENNA AND DOCUSATE SODIUM 50; 8.6 MG/1; MG/1
1 TABLET, FILM COATED ORAL 2 TIMES DAILY
COMMUNITY
Start: 2021-07-19 | End: 2022-12-01

## 2021-07-19 RX ORDER — FLUCONAZOLE 150 MG/1
150 TABLET ORAL ONCE
Qty: 1 TABLET | Refills: 0 | Status: SHIPPED | OUTPATIENT
Start: 2021-07-19 | End: 2021-07-19

## 2021-07-19 RX ORDER — HYDRALAZINE HYDROCHLORIDE 20 MG/ML
5 INJECTION INTRAMUSCULAR; INTRAVENOUS EVERY 6 HOURS PRN
Status: DISCONTINUED | OUTPATIENT
Start: 2021-07-19 | End: 2021-07-19 | Stop reason: HOSPADM

## 2021-07-19 RX ORDER — POLYETHYLENE GLYCOL 3350 17 G/17G
17 POWDER, FOR SOLUTION ORAL DAILY PRN
Qty: 510 G | COMMUNITY
Start: 2021-07-19 | End: 2023-07-03

## 2021-07-19 RX ADMIN — ACETAMINOPHEN 650 MG: 325 TABLET, FILM COATED ORAL at 10:10

## 2021-07-19 RX ADMIN — CEFTRIAXONE 1 G: 1 INJECTION, POWDER, FOR SOLUTION INTRAMUSCULAR; INTRAVENOUS at 10:10

## 2021-07-19 RX ADMIN — PANTOPRAZOLE SODIUM 40 MG: 20 TABLET, DELAYED RELEASE ORAL at 10:10

## 2021-07-19 ASSESSMENT — MIFFLIN-ST. JEOR: SCORE: 1193.89

## 2021-07-19 NOTE — PLAN OF CARE
"PRIMARY DIAGNOSIS: \"GENERIC\" NURSING  OUTPATIENT/OBSERVATION GOALS TO BE MET BEFORE DISCHARGE:  1. ADLs back to baseline: Yes    2. Activity and level of assistance: Up with standby assistance.    3. Pain status: Pain free.    4. Return to near baseline physical activity: Yes     Discharge Planner Nurse   Safe discharge environment identified: Yes  Barriers to discharge: No       Entered by: Jenny Ricci 07/19/2021 6:02 AM     Please review provider order for any additional goals.   Nurse to notify provider when observation goals have been met and patient is ready for discharge.  "

## 2021-07-19 NOTE — PLAN OF CARE
"PRIMARY DIAGNOSIS: \"GENERIC\" NURSING  OUTPATIENT/OBSERVATION GOALS TO BE MET BEFORE DISCHARGE:  1. ADLs back to baseline: Yes    2. Activity and level of assistance: Up with standby assistance.    3. Pain status: Pain free.    4. Return to near baseline physical activity: Yes     Discharge Planner Nurse   Safe discharge environment identified: Yes  Barriers to discharge: No       Entered by: Jenny Ricci 07/19/2021 3:53 AM     Please review provider order for any additional goals.   Nurse to notify provider when observation goals have been met and patient is ready for discharge.  BP elevated 170-180s/80. Pt anxious about BP elevation. Discussed with pt plan of care and hydralazine administration if needed.   "

## 2021-07-19 NOTE — PLAN OF CARE
Problem: Syncope  Goal: Absence of Syncopal Symptoms  7/18/2021 2154 by Earnestine Poon, RN  Outcome: Improving  7/18/2021 2152 by Earnestine Poon, RN  Outcome: Improving     Denies dizziness and lightheadedness. Ambulating with SBA.

## 2021-07-19 NOTE — PLAN OF CARE
Problem: Syncope  Goal: Absence of Syncopal Symptoms  Outcome: Adequate for Discharge     Problem: Adult Inpatient Plan of Care  Goal: Optimal Comfort and Wellbeing  Outcome: Adequate for Discharge     Discharge eduction provided about follow up appointments, medications, and daily care. Patient verbalized understanding.

## 2021-07-19 NOTE — PROGRESS NOTES
Nutrition Therapy  RD consult noted. RD is unable to see observation status patients at this time as it is a violation of CMS conditions of participation. Will assess if admission status changes.   Thank you

## 2021-07-19 NOTE — DISCHARGE SUMMARY
[facility] MEDICINE  DISCHARGE SUMMARY     Primary Care Physician: Vahid, [facility]  Admission Date: 7/17/2021   Discharge Provider: Qamar Acosta MD Discharge Date: 7/19/2021   Diet:   Active Diet and Nourishment Order   Procedures     Regular Diet Adult     Diet     Code Status: Full Code   Activity: DCACTIVITY: Activity as tolerated  Stay well-hydrated.  Avoid the heat.      Condition at Discharge: Stable     REASON FOR PRESENTATION(See Admission Note for Details)   Syncopal event    PRINCIPAL & ACTIVE DISCHARGE DIAGNOSES     Active Problems:    Syncope    Constipation, unspecified constipation type    Acute cystitis without hematuria    Yeast vaginitis  QT prolongation  Urinary incontinence  Chronic constipation/IBS  GERD  Chest rash  Secondary hypertension    PENDING LABS     Unresulted Labs Ordered in the Past 30 Days of this Admission     Date and Time Order Name Status Description    7/17/2021  6:09 PM Blood Culture Arm, Right Preliminary     7/17/2021  6:09 PM Blood Culture Peripheral Blood Preliminary         PROCEDURES ( this hospitalization only)      None  RECOMMENDATIONS TO OUTPATIENT PROVIDER FOR F/U VISIT     1.  Keep a stool and diet diary.  2.  Recommendations given to address constipation.  3.  Obtain digital upper arm blood pressure cuff.  Check readings twice a day.  4.  Follow-up with primary care provider within 1 week.  5.  If still struggling with constipation then could follow-up with [facility].    DISPOSITION     Home    SUMMARY OF HOSPITAL COURSE:      80-year-old female with past medical history significant for GERD, depression, pancreatitis, constipation and vasovagal syncope.  7/17 seen in the ED after having a syncopal episode with urinary incontinence.  Work-up in the ED was negative except for an abnormal urinalysis and EKG showed a QT prolongation of 500 ms.  Abdominal CT showed significant stool burden at the rectum  otherwise no significant acute findings.  Admitted.    1.  Cardiac.  No events on telemetry monitoring.  Repeat EKG shows resolution of the QT prolongation.  Echocardiogram was normal.  EF 65%.  Was noted to run a little bit hypertensive.  Chart review shows no previous episodes of high blood pressure.  Recommend getting a digital home cuff and checking readings.  Follow-up with primary if elevated.    2.  Infectious disease.  Abnormal urinalysis noted admission.  Intravenous ceftriaxone started.  Some of the dysuria she noted has resolved.  Urine culture did come back negative however given patient's resenting symptoms will treat with 3 more days of Omnicef.  Diflucan given for yeast vaginitis prevention.    3.  GI.  Patient does chronically suffer with constipation and significant stool burden was noted on admission CT.  Did have resolution with MiraLAX.  Recommend senna docusate at discharge.  We will keep a stool and diet diary.  Could be dealing with IBS constipation subtype.  If not seeing improvement then could follow-up with [facility].  Certainly vasovagal syncope could be due to significant constipation.    Discharge Medications with Med changes:     Current Discharge Medication List      START taking these medications    Details   cefdinir (OMNICEF) 300 MG capsule Take 1 capsule (300 mg) by mouth 2 times daily for 3 days  Qty: 6 capsule, Refills: 0    Associated Diagnoses: Acute cystitis without hematuria      fluconazole (DIFLUCAN) 150 MG tablet Take 1 tablet (150 mg) by mouth once for 1 dose  Qty: 1 tablet, Refills: 0    Associated Diagnoses: Yeast vaginitis      polyethylene glycol (MIRALAX) 17 GM/Dose powder Take 17 g by mouth daily as needed for constipation  Qty: 510 g    Associated Diagnoses: Constipation, unspecified constipation type      SENNA-docusate sodium (SENNA S) 8.6-50 MG tablet Take 1 tablet by mouth 2 times daily    Associated Diagnoses: Constipation, unspecified constipation type          CONTINUE these medications which have NOT CHANGED    Details   Fexofenadine HCl (ALLEGRA PO) Take 60 mg by mouth daily as needed       multivitamin, therapeutic with minerals (THERA-VIT-M) TABS Take 1 tablet by mouth daily.        omeprazole (PRILOSEC) 20 MG DR capsule Take 40 mg by mouth daily       valACYclovir (VALTREX) 1000 mg tablet Take 1 g by mouth 3 times daily Started 7/15/2021 x 7 days               Rationale for medication changes:      Antibiotics for UTI.  Diflucan for yeast vaginitis prevention.  Constipation treatment.      Consults     SOCIAL WORK IP CONSULT  PHYSICAL THERAPY ADULT IP CONSULT      Immunizations given this encounter     Most Recent Immunizations   Administered Date(s) Administered     COVID-19,PF,Pfizer 02/21/2021           Anticoagulation Information      Recent INR results: No results for input(s): INR in the last 168 hours.  Warfarin doses (if applicable) or name of other anticoagulant: na      SIGNIFICANT IMAGING FINDINGS     Results for orders placed or performed during the hospital encounter of 07/17/21   CT Chest Pulmonary Embolism w Contrast    Impression    IMPRESSION:  1.  No pulmonary embolism.    2.  Few subpleural regions of scarring in the lungs.    3.  Small to moderate size esophageal hiatal hernia.    4.  Fatty liver.    5.  The rectum is distended with stool.    6.  Hysterectomy.    7.  Coronary artery calcification.   Head CT w/o contrast    Impression    IMPRESSION:    1.  No evidence of acute intracranial hemorrhage or mass effect.  2.  Calcified meningioma overlying the left parafalcine frontal lobe measuring 9 mm.   3.  Mild nonspecific white matter changes.  4.  Mild brain parenchymal volume loss.   CT Abdomen Pelvis w Contrast    Impression    IMPRESSION:  1.  No pulmonary embolism.    2.  Few subpleural regions of scarring in the lungs.    3.  Small to moderate size esophageal hiatal hernia.    4.  Fatty liver.    5.  The rectum is distended with  stool.    6.  Hysterectomy.    7.  Coronary artery calcification.     Echocardiogram Complete  Order: [id number]  Status:  Edited Result - FINAL   Visible to patient:  Yes (MyChart) Next appt:  None  0 Result Notes  Details    Reading Physician Reading Date Result Priority   Stewart Castro MD  [phone] 2021       Narrative & Impression  [id number]  [id number]  [id number]  [id number]^CANDICE^[place] [address]     Name: SHI LERMA  MRN: [id number]  : 1940  Study Date: 2021 11:06 AM  Age: 80 yrs  Gender: Female  Patient Location: [facility]  Reason For Study: Syncope  Ordering Physician: FERNANDO HARVEY  Performed By: JOSUE     BSA: 1.8 m2  Height: 66 in  Weight: 157 lb  HR: 80  ______________________________________________________________________________  Procedure  Definity (NDC #89476-078) given intravenously.  ______________________________________________________________________________  Interpretation Summary     1. Normal left ventricular size and systolic performance with a visually  estimated ejection fraction of 65%.  2. No significant valvular heart disease is identified on this study.  3. Normal right ventricular size and systolic performance.          SIGNIFICANT LABORATORY FINDINGS     Most Recent 3 CBC's:  Recent Labs   Lab Test 21  0551 21  1821   WBC 9.1 10.2   HGB 11.8 12.7   MCV 94 94    290     Most Recent 3 BMP's:  Recent Labs   Lab Test 21  0551 21  1821 21  1806   * 138  --    POTASSIUM 3.8 3.7  --    CHLORIDE 106 106  --    CO2 24 22  --    BUN 16 20  --    CR 0.63 0.75  --    ANIONGAP 5 10  --    KINJAL 8.6 9.3  --    GLC 97 121 103     Most Recent 2 LFT's:  Recent Labs   Lab Test 21  0551 21  1821   AST 19 23   ALT 14 18   ALKPHOS 100 116   BILITOTAL 0.6 0.6     Results for SHI LERMA (MRN [id number]) as of 2021 14:27   Ref. Range 2021 19:00 2021  00:47   Color Urine Latest Ref Range: Colorless, Straw, Light Yellow, Yellow  Light Yellow Colorless   Appearance Urine Latest Ref Range: Clear  Clear Clear   Glucose Urine Latest Ref Range: Negative mg/dL Negative Negative   Bilirubin Urine Latest Ref Range: Negative  Negative Negative   Ketones Urine Latest Ref Range: Negative mg/dL Trace (A) Negative   Specific Gravity Urine Latest Ref Range: 1.001 - 1.030  1.014 1.011   pH Urine Latest Ref Range: 5.0 - 7.0  7.0 6.0   Protein Albumin Urine Latest Ref Range: Negative mg/dL Negative Negative   Urobilinogen mg/dL Latest Ref Range: <2.0 mg/dL <2.0 <2.0   Nitrite Urine Latest Ref Range: Negative  Negative Negative   Blood Urine Latest Ref Range: Negative  Negative Negative   Leukocyte Esterase Urine Latest Ref Range: Negative  75 Sherri/uL (A) Negative   WBC Urine Latest Ref Range: <=5 /HPF 12 (H) 1   RBC Urine Latest Ref Range: <=2 /HPF 2 1   Bacteria Urine Latest Ref Range: None Seen /HPF Few (A)    Squamous Epithelial /HPF Urine Latest Ref Range: <=1 /HPF 1 <1   Mucus Urine Latest Ref Range: None Seen /LPF Present (A) Present (A)   Hyaline Casts Latest Ref Range: <=2 /LPF 18 (H)      Urine Culture  Order: [id number]  Collected:  7/17/2021  7:00 PM Status:  Final result   Visible to patient:  Yes (MyChart)  Specimen Information: Urine, Midstream        0 Result Notes  Culture No Growth          Resulting Agency: [facility]         Specimen Collected: 07/17/21  7:00 PM               Discharge Orders        Reason for your hospital stay    Syncopal event     Follow-up and recommended labs and tests     1.  Keep a stool and diet diary.2.  Follow-up with primary care provider in 1 week.  3.  If constipation continues to be an issue can follow-up with [facility].  Give phone number to patient.     Activity    Your activity upon discharge: activity as tolerated     Brief Discharge Instructions    Obtain digital upper arm cuff.  Check blood pressure readings at home and record.   If elevated follow-up with primary care provider.  Bring upper arm cuff to appointment.     Diet    Follow this diet upon discharge: Orders Placed This Encounter      Regular Diet Adult       Examination   Physical Exam   Temp:  [97.3  F (36.3  C)-98.3  F (36.8  C)] 98.1  F (36.7  C)  Pulse:  [71-89] 72  Resp:  [12-18] 16  BP: (123-187)/(60-92) 147/70  SpO2:  [92 %-97 %] 97 %  Wt Readings from Last 4 Encounters:   07/19/21 71.7 kg (158 lb 1.6 oz)   11/17/12 70.8 kg (156 lb)   07/28/12 72.6 kg (160 lb)       Constitutional: awake, alert, cooperative, no apparent distress, and appears stated age  Eyes: Lids and lashes normal, pupils equal, round and reactive to light, extra ocular muscles intact, sclera clear, conjunctiva normal  ENT: Normocephalic, without obvious abnormality, atraumatic, sinuses nontender on palpation, external ears without lesions, oral pharynx with moist mucous membranes, tonsils without erythema or exudates, gums normal and good dentition.  Hematologic / Lymphatic: no cervical lymphadenopathy and no supraclavicular lymphadenopathy  Respiratory: No increased work of breathing, good air exchange, clear to auscultation bilaterally, no crackles or wheezing  Cardiovascular: Normal apical impulse, regular rate and rhythm, normal S1 and S2, no S3 or S4, and no murmur noted  GI: No scars, normal bowel sounds, soft, non-distended, non-tender, no masses palpated, no hepatosplenomegally  Skin: normal skin color, texture, turgor, no redness, warmth, or swelling, and no rashes  Musculoskeletal: There is no redness, warmth, or swelling of the joints.  Full range of motion noted.  Motor strength is 5 out of 5 all extremities bilaterally.  Tone is normal. no lower extremity pitting edema present  Neurologic: Awake, alert, oriented to name, place and time.  Cranial nerves II-XII are grossly intact.  Motor is 5 out of 5 bilaterally.  Sensory is intact.  Neuropsychiatric: General: normal, calm and normal eye  contact Level of consciousness: alert / normal Affect: normal Orientation: oriented to self, place, time and situation Memory and insight: normal, memory for past and recent events intact and thought process normal      Please see EMR for more detailed significant labs, imaging, consultant notes etc.    IQamar MD, personally saw the patient today and spent greater than 30 minutes discharging this patient.    Qamar Acosta MD  [facility]    CC:Clinic, [facility]

## 2021-07-19 NOTE — PLAN OF CARE
Problem: Syncope  Goal: Absence of Syncopal Symptoms  Outcome: Improving    Denies dizziness and lightheadedness. Walking in halls with SBA.     Problem: Adult Inpatient Plan of Care  Goal: Absence of Hospital-Acquired Illness or Injury  Intervention: Identify and Manage Fall Risk  Recent Flowsheet Documentation  Taken 7/18/2021 2000 by Earnestine Poon RN  Safety Promotion/Fall Prevention:   nonskid shoes/slippers when out of bed   lighting adjusted  Taken 7/18/2021 1600 by Earnestine Poon RN  Safety Promotion/Fall Prevention:   nonskid shoes/slippers when out of bed   lighting adjusted  Intervention: Prevent Skin Injury  Recent Flowsheet Documentation  Taken 7/18/2021 2000 by Earnestine Poon, RN  Body Position: position changed independently  Taken 7/18/2021 1600 by Earnestine Poon RN  Body Position: position changed independently

## 2021-07-23 LAB
BACTERIA BLD CULT: NO GROWTH
BACTERIA BLD CULT: NO GROWTH

## 2021-08-02 NOTE — PROGRESS NOTES
[facility]      OUTPATIENT PHYSICAL THERAPY EVALUATION  PLAN OF TREATMENT FOR OUTPATIENT REHABILITATION  (COMPLETE FOR INITIAL CLAIMS ONLY)  Patient's Last Name, First Name, M.I.  YOB: 1940  Anabelle Cameron                        Provider's Name  [facility] Medical Record No.  [id number]                               Onset Date:  07/17/21   Start of Care Date:  (P) 07/18/21      Type:     _X_PT   ___OT   ___SLP Medical Diagnosis:  (P) syncope                        PT Diagnosis:      Visits from SOC:  1   _________________________________________________________________________________  Plan of Treatment/Functional Goals    Planned Interventions:       Goals: See Physical Therapy Goals on Care Plan in [employer] electronic health record.    Therapy Frequency: One time eval and treatment only  Predicted Duration of Therapy Intervention:    _________________________________________________________________________________    I CERTIFY THE NEED FOR THESE SERVICES FURNISHED UNDER        THIS PLAN OF TREATMENT AND WHILE UNDER MY CARE     (Physician co-signature of this document indicates review and certification of the therapy plan).                Certification date from: (P) 07/18/21, Certification date to: (P) 08/18/21    Referring Physician: Qamar Garay            Initial Assessment        See Physical Therapy evaluation dated (P) 07/18/21 in Epic electronic health record.

## 2021-09-25 ENCOUNTER — HEALTH MAINTENANCE LETTER (OUTPATIENT)
Age: 81
End: 2021-09-25

## 2022-05-01 ENCOUNTER — HEALTH MAINTENANCE LETTER (OUTPATIENT)
Age: 82
End: 2022-05-01

## 2022-06-21 ENCOUNTER — APPOINTMENT (OUTPATIENT)
Dept: RADIOLOGY | Facility: CLINIC | Age: 82
End: 2022-06-21
Attending: EMERGENCY MEDICINE
Payer: COMMERCIAL

## 2022-06-21 ENCOUNTER — HOSPITAL ENCOUNTER (EMERGENCY)
Facility: CLINIC | Age: 82
Discharge: HOME | End: 2022-06-22
Attending: EMERGENCY MEDICINE | Admitting: EMERGENCY MEDICINE
Payer: COMMERCIAL

## 2022-06-21 DIAGNOSIS — R55 NEAR SYNCOPE: ICD-10-CM

## 2022-06-21 LAB
ALBUMIN UR-MCNC: NEGATIVE MG/DL
ANION GAP SERPL CALCULATED.3IONS-SCNC: 8 MMOL/L (ref 5–18)
APPEARANCE UR: CLEAR
BASOPHILS # BLD AUTO: 0 10E3/UL (ref 0–0.2)
BASOPHILS NFR BLD AUTO: 1 %
BILIRUB UR QL STRIP: NEGATIVE
BNP SERPL-MCNC: 44 PG/ML (ref 0–167)
BUN SERPL-MCNC: 12 MG/DL (ref 8–28)
CALCIUM SERPL-MCNC: 9.5 MG/DL (ref 8.5–10.5)
CHLORIDE BLD-SCNC: 103 MMOL/L (ref 98–107)
CO2 SERPL-SCNC: 27 MMOL/L (ref 22–31)
COLOR UR AUTO: COLORLESS
CREAT SERPL-MCNC: 0.51 MG/DL (ref 0.6–1.1)
EOSINOPHIL # BLD AUTO: 0.4 10E3/UL (ref 0–0.7)
EOSINOPHIL NFR BLD AUTO: 4 %
ERYTHROCYTE [DISTWIDTH] IN BLOOD BY AUTOMATED COUNT: 12.9 % (ref 10–15)
GFR SERPL CREATININE-BSD FRML MDRD: >90 ML/MIN/1.73M2
GLUCOSE BLD-MCNC: 99 MG/DL (ref 70–125)
GLUCOSE UR STRIP-MCNC: NEGATIVE MG/DL
HCT VFR BLD AUTO: 27.5 % (ref 35–47)
HGB BLD-MCNC: 9.1 G/DL (ref 11.7–15.7)
HGB UR QL STRIP: NEGATIVE
IMM GRANULOCYTES # BLD: 0.1 10E3/UL
IMM GRANULOCYTES NFR BLD: 1 %
KETONES UR STRIP-MCNC: NEGATIVE MG/DL
LEUKOCYTE ESTERASE UR QL STRIP: NEGATIVE
LYMPHOCYTES # BLD AUTO: 2.5 10E3/UL (ref 0.8–5.3)
LYMPHOCYTES NFR BLD AUTO: 29 %
MCH RBC QN AUTO: 31.1 PG (ref 26.5–33)
MCHC RBC AUTO-ENTMCNC: 33.1 G/DL (ref 31.5–36.5)
MCV RBC AUTO: 94 FL (ref 78–100)
MONOCYTES # BLD AUTO: 0.8 10E3/UL (ref 0–1.3)
MONOCYTES NFR BLD AUTO: 10 %
NEUTROPHILS # BLD AUTO: 4.8 10E3/UL (ref 1.6–8.3)
NEUTROPHILS NFR BLD AUTO: 55 %
NITRATE UR QL: NEGATIVE
NRBC # BLD AUTO: 0 10E3/UL
NRBC BLD AUTO-RTO: 0 /100
PH UR STRIP: 8 [PH] (ref 5–7)
PLATELET # BLD AUTO: 428 10E3/UL (ref 150–450)
POTASSIUM BLD-SCNC: 3.9 MMOL/L (ref 3.5–5)
RBC # BLD AUTO: 2.93 10E6/UL (ref 3.8–5.2)
RBC URINE: <1 /HPF
SODIUM SERPL-SCNC: 138 MMOL/L (ref 136–145)
SP GR UR STRIP: 1 (ref 1–1.03)
SQUAMOUS EPITHELIAL: <1 /HPF
TROPONIN I SERPL-MCNC: <0.01 NG/ML (ref 0–0.29)
TROPONIN I SERPL-MCNC: <0.01 NG/ML (ref 0–0.29)
UROBILINOGEN UR STRIP-MCNC: <2 MG/DL
WBC # BLD AUTO: 8.5 10E3/UL (ref 4–11)
WBC URINE: 1 /HPF

## 2022-06-21 PROCEDURE — 250N000013 HC RX MED GY IP 250 OP 250 PS 637: Performed by: EMERGENCY MEDICINE

## 2022-06-21 PROCEDURE — 36415 COLL VENOUS BLD VENIPUNCTURE: CPT | Performed by: EMERGENCY MEDICINE

## 2022-06-21 PROCEDURE — 99285 EMERGENCY DEPT VISIT HI MDM: CPT | Mod: 25

## 2022-06-21 PROCEDURE — 84484 ASSAY OF TROPONIN QUANT: CPT | Performed by: EMERGENCY MEDICINE

## 2022-06-21 PROCEDURE — 81003 URINALYSIS AUTO W/O SCOPE: CPT | Performed by: EMERGENCY MEDICINE

## 2022-06-21 PROCEDURE — 85025 COMPLETE CBC W/AUTO DIFF WBC: CPT | Performed by: EMERGENCY MEDICINE

## 2022-06-21 PROCEDURE — 82310 ASSAY OF CALCIUM: CPT | Performed by: EMERGENCY MEDICINE

## 2022-06-21 PROCEDURE — 93005 ELECTROCARDIOGRAM TRACING: CPT | Performed by: EMERGENCY MEDICINE

## 2022-06-21 PROCEDURE — 71045 X-RAY EXAM CHEST 1 VIEW: CPT

## 2022-06-21 PROCEDURE — 83880 ASSAY OF NATRIURETIC PEPTIDE: CPT | Performed by: EMERGENCY MEDICINE

## 2022-06-21 RX ORDER — HYDROXYZINE HYDROCHLORIDE 25 MG/1
50 TABLET, FILM COATED ORAL ONCE
Status: COMPLETED | OUTPATIENT
Start: 2022-06-21 | End: 2022-06-21

## 2022-06-21 RX ADMIN — HYDROXYZINE HYDROCHLORIDE 50 MG: 25 TABLET, FILM COATED ORAL at 19:33

## 2022-06-21 NOTE — ED TRIAGE NOTES
The patient presents to the ED with dizziness that began this afternoon around 1315. The patient reports she had a spinal fusion on Wednesday of last week and was discharged from the hospital on Saturday. The patient reports burning with urination and shortness of breath as well. The patient reports the patient has a history of dizziness and vertigo in the past and reports this feels similar to previous episodes. The patient reports she is sure she has a UTI and reports these symptoms are common for her when she gets UTIs. She had a catheter while in the hospital.

## 2022-06-22 VITALS
WEIGHT: 155 LBS | RESPIRATION RATE: 20 BRPM | SYSTOLIC BLOOD PRESSURE: 133 MMHG | HEIGHT: 64 IN | BODY MASS INDEX: 26.46 KG/M2 | TEMPERATURE: 98.6 F | HEART RATE: 102 BPM | DIASTOLIC BLOOD PRESSURE: 72 MMHG | OXYGEN SATURATION: 96 %

## 2022-06-22 NOTE — ED PROVIDER NOTES
EMERGENCY DEPARTMENT ENCOUNTER      NAME: Anabelle Cameron  AGE: 81 year old female  YOB: 1940  MRN: [id number]  EVALUATION DATE & TIME: 2022  5:58 PM    PCP: [facility], [facility]    ED PROVIDER: Jamshid Wharton D.O.      Chief Complaint   Patient presents with     Dizziness     Spinal surgery 6/15       FINAL IMPRESSION:  1. Near syncope        ED COURSE & MEDICAL DECISION MAKIN:05 PM I met with the patient to gather history and to perform my initial exam. I discussed the plan for care while in the Emergency Department.  8:41 PM Patients hemoglobin is 9.1 today. On 22 at [facility] it was 8.2.  9:10 PM  I rechecked the patient and updated them on laboratory and imaging results.  9:44 PM Ambulated without getting dizzy.  10:30 PM Patient sign out to Dr Norman, pending repeat troponin         Pertinent Labs & Imaging studies reviewed. (See chart for details)  81 year old female presents to the Emergency Department for evaluation of episodes of near syncope with standing.  Patient has longstanding history of vasovagal syncope.  She is quite anxious, and somewhat tachycardic, initial evaluation, however the tachycardia improved after she was given hydroxyzine for the anxiety she was feeling.  She was able to ambulate without any near syncopal episodes in the emergency department.  EKG does not show any evidence of ischemia or arrhythmia, first troponin was negative, second troponin is pending.  With the fact that the patient has had the symptoms for a longstanding period of time, if her second troponin is negative I believe she can be safely discharged home with outpatient follow-up with cardiology especially as she is now asymptomatic.  Clinically there is no concern for PE, dissection, pneumothorax, or infectious process.  Patient care turned over to Dr. Norman for final disposition.    At the conclusion of the encounter I discussed the results of all of the tests and the  disposition. The questions were answered. The patient or family acknowledged understanding and was agreeable with the care plan.        HPI    Patient information was obtained from: Patient    Use of : N/A       Anabelle Cameron is a 81 year old female who presents for evaluation of dizziness    Patient had a spinal fusion at [facility] on Wednesday. She was in the hospital for 3 days and was discharged Sunday. They gave her oxycodone but she dislikes it. Around 1:30 today she was walking and felt dizzy like she was gonna pass out. She called her care team and they recommended she come to the ED. She has a history of syncope and says that this episode felt similar to previous episodes. She has a history of UTI and says that her symptoms can cause her episode. She says that she had palpitations and shortness of breath. She is currently dizzy and nauseated.  She denies slurred speech and chest pain.    She has a meningioma but it has no change on most recent MRI. She takes medication for GERD and a cholesterol pill. She has anxiety, and used to take Zoloft, but no longer is taking it. She denies smoking and does drink occasionally. She was seen by a cardiologist a year ago for her tachycardia and they gave her a heart monitor, but nothing was found.      REVIEW OF SYSTEMS  Constitutional:  Denies fever, chills, weight loss or weakness  Eyes:  No pain, discharge, redness  HENT:  Denies sore throat, ear pain, congestion  Respiratory: Positive for SOB,. Denies wheeze or cough  Cardiovascular:  No CP. Positive for palpitations  GI:  Denies abdominal pain, vomiting, diarrhea. Positive for nausea.  : Denies dysuria, hematuria  Musculoskeletal:  Denies any new muscle/joint pain, swelling or loss of function.  Skin:  Denies rash, pallor  Neurologic:  Denies headache, focal weakness or sensory changes. Positive for dizziness.  Lymph: Denies swollen nodes    All other systems negative unless noted in HPI.    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 LEFT EYE Occurred at time of IOL/ERM       PAST SURGICAL HISTORY:  Past Surgical History:   Procedure Laterality Date     APPENDECTOMY OPEN       GYN SURGERY       ORTHOPEDIC SURGERY           CURRENT MEDICATIONS:    No current facility-administered medications for this encounter.     Current Outpatient Medications   Medication     Fexofenadine HCl (ALLEGRA PO)     multivitamin, therapeutic with minerals (THERA-VIT-M) TABS     omeprazole (PRILOSEC) 20 MG DR capsule     polyethylene glycol (MIRALAX) 17 GM/Dose powder     SENNA-docusate sodium (SENNA S) 8.6-50 MG tablet     valACYclovir (VALTREX) 1000 mg tablet         ALLERGIES:  Allergies   Allergen Reactions     Levofloxacin Other (See Comments), Dizziness and Nausea     dizziness  Patient unable to tolerate side effects       Hydrocodone-Acetaminophen Other (See Comments)     Dizziness, fainting  Other reaction(s): Syncope  Vasovagal episode         Oxycodone-Acetaminophen Other (See Comments)     Dizziness, fainting       FAMILY HISTORY:  No family history on file.    SOCIAL HISTORY:  Social History     Socioeconomic History     Marital status:    Substance and Sexual Activity     Alcohol use: Yes     Comment: social     Drug use: No       VITALS:  Patient Vitals for the past 24 hrs:   BP Temp Temp src Pulse Resp SpO2 Height Weight   06/21/22 2200 (!) 153/76 -- -- 105 28 96 % -- --   06/21/22 2123 (!) 158/74 -- -- 97 19 96 % -- --   06/21/22 2020 -- -- -- -- -- 99 % -- --   06/21/22 2015 (!)  "175/89 -- -- 98 17 -- -- --   06/21/22 1920 134/72 -- -- 108 -- 93 % -- --   06/21/22 1900 136/77 -- -- 111 -- 98 % -- --   06/21/22 1752 138/81 98.6  F (37  C) Oral 120 20 100 % 1.626 m (5' 4\") 70.3 kg (155 lb)       PHYSICAL EXAM    VITAL SIGNS: BP (!) 153/76   Pulse 105   Temp 98.6  F (37  C) (Oral)   Resp 28   Ht 1.626 m (5' 4\")   Wt 70.3 kg (155 lb)   SpO2 96%   BMI 26.61 kg/m      General Appearance: anxiousl-appearing, well-nourished, no acute distress   Head:  Normocephalic, without obvious abnormality, atraumatic  Eyes:  PERRL, conjunctiva/corneas clear, EOM's intact,  ENT:  Lips, mucosa, and tongue normal, membranes are moist without pallor  Neck:  Normal ROM, symmetrical, trachea midline    Chest:  No tenderness or deformity, no crepitus  Cardio:  Regular rate and rhythm, no murmur, rub or gallop, 2+ pulses symmetric in all extremities  Pulm:  Clear to auscultation bilaterally, respirations unlabored,  Back:  ROM normal, no CVA tenderness, no spinal tenderness, no paraspinal tenderness  Abdomen:  Soft, non-tender, no rebound or guarding.  Musculoskeletal: Full ROM, no edema, no cyanosis, good ROM of major joints  Integument:  Warm, Dry, No erythema, No rash.    Neurologic:  Alert & oriented.  No focal deficits appreciated.  Ambulatory.  Psychiatric:  Affect normal, Judgment normal, Anxious mood     LABS  Results for orders placed or performed during the hospital encounter of 06/21/22 (from the past 24 hour(s))   UA with Microscopic reflex to Culture    Specimen: Urine, Midstream   Result Value Ref Range    Color Urine Colorless Colorless, Straw, Light Yellow, Yellow    Appearance Urine Clear Clear    Glucose Urine Negative Negative mg/dL    Bilirubin Urine Negative Negative    Ketones Urine Negative Negative mg/dL    Specific Gravity Urine 1.004 1.001 - 1.030    Blood Urine Negative Negative    pH Urine 8.0 (H) 5.0 - 7.0    Protein Albumin Urine Negative Negative mg/dL    Urobilinogen Urine <2.0 " <2.0 mg/dL    Nitrite Urine Negative Negative    Leukocyte Esterase Urine Negative Negative    RBC Urine <1 <=2 /HPF    WBC Urine 1 <=5 /HPF    Squamous Epithelials Urine <1 <=1 /HPF    Narrative    Urine Culture not indicated   CBC with platelets + differential    Narrative    The following orders were created for panel order CBC with platelets + differential.  Procedure                               Abnormality         Status                     ---------                               -----------         ------                     CBC with platelets and d...[[id number]]  Abnormal            Final result                 Please view results for these tests on the individual orders.   Basic metabolic panel   Result Value Ref Range    Sodium 138 136 - 145 mmol/L    Potassium 3.9 3.5 - 5.0 mmol/L    Chloride 103 98 - 107 mmol/L    Carbon Dioxide (CO2) 27 22 - 31 mmol/L    Anion Gap 8 5 - 18 mmol/L    Urea Nitrogen 12 8 - 28 mg/dL    Creatinine 0.51 (L) 0.60 - 1.10 mg/dL    Calcium 9.5 8.5 - 10.5 mg/dL    Glucose 99 70 - 125 mg/dL    GFR Estimate >90 >60 mL/min/1.73m2   Troponin I (now)   Result Value Ref Range    Troponin I <0.01 0.00 - 0.29 ng/mL   B-Type Natriuretic Peptide ([facility] Only)   Result Value Ref Range    BNP 44 0 - 167 pg/mL   CBC with platelets and differential   Result Value Ref Range    WBC Count 8.5 4.0 - 11.0 10e3/uL    RBC Count 2.93 (L) 3.80 - 5.20 10e6/uL    Hemoglobin 9.1 (L) 11.7 - 15.7 g/dL    Hematocrit 27.5 (L) 35.0 - 47.0 %    MCV 94 78 - 100 fL    MCH 31.1 26.5 - 33.0 pg    MCHC 33.1 31.5 - 36.5 g/dL    RDW 12.9 10.0 - 15.0 %    Platelet Count 428 150 - 450 10e3/uL    % Neutrophils 55 %    % Lymphocytes 29 %    % Monocytes 10 %    % Eosinophils 4 %    % Basophils 1 %    % Immature Granulocytes 1 %    NRBCs per 100 WBC 0 <1 /100    Absolute Neutrophils 4.8 1.6 - 8.3 10e3/uL    Absolute Lymphocytes 2.5 0.8 - 5.3 10e3/uL    Absolute Monocytes 0.8 0.0 - 1.3 10e3/uL    Absolute Eosinophils 0.4  0.0 - 0.7 10e3/uL    Absolute Basophils 0.0 0.0 - 0.2 10e3/uL    Absolute Immature Granulocytes 0.1 <=0.4 10e3/uL    Absolute NRBCs 0.0 10e3/uL   XR Chest Port 1 View    Narrative    EXAM: XR CHEST PORT 1 VIEW  LOCATION: [facility]  DATE/TIME: 6/21/2022 8:07 PM    INDICATION: near syncope  COMPARISON: 04/15/2021      Impression    IMPRESSION: Stable curvature of the thoracic spine. Otherwise negative chest. The lungs are clear. Heart size normal.         RADIOLOGY  XR Chest Port 1 View   Final Result   IMPRESSION: Stable curvature of the thoracic spine. Otherwise negative chest. The lungs are clear. Heart size normal.             EKG:    Rate: 104bpm  Rhythm: sinus tachycardia  Axis: Normal  Interval: Normal  Conduction: Normal  QRS: Normal  ST: Normal  T-wave: Normal  QT: Not prolonged  Comparison EKG: no significant change compared 18 July 2021  Impression:  No acute ischemic change   I have independently reviewed and interpreted today's EKG, pending Cardiologist read      MEDICATIONS GIVEN IN THE EMERGENCY:  Medications   hydrOXYzine (ATARAX) tablet 50 mg (50 mg Oral Given 6/21/22 1933)       NEW PRESCRIPTIONS STARTED AT TODAY'S ER VISIT  New Prescriptions    No medications on file        I, Yogesh Johnston, am serving as a scribe to document services personally performed by Jamshid Wharton D.O. based on my observations and the provider's statements to me.  I, Jamshid Wharton D.O., attest that Yogesh Johnston is acting in a scribe capacity, has observed my performance of the services and has documented them in accordance with my direction.      Jamshid Wharton D.O.  Emergency Medicine  [facility] EMERGENCY ROOM  [address] [address]  [phone]  Dept: [phone]       Jamshid Wharton,   06/21/22 [id number]

## 2022-06-22 NOTE — ED NOTES
EMERGENCY DEPARTMENT SIGN OUT NOTE        ED COURSE AND MEDICAL DECISION MAKING  Patient was signed out to me by Dr Jamshid Wharton at 10:00 PM    In brief, Anabelle Cameron is a 81 year old female who initially presented for evaluation of dizziness. Patient had a spinal fusion at [facility] on Wednesday. She was in the hospital for 3 days and was discharged Sunday. They gave her oxycodone but she dislikes it. Around 1:30 today she was walking and felt dizzy like she was gonna pass out. She called her care team and they recommended she come to the ED. She has a history of syncope and says that this episode felt similar to previous episodes. She has a history of UTI and says that her symptoms can cause her episode.     At time of sign out, disposition was pending repeat troponin.    Repeat troponin negative.  Patient feeling better.  Will discharge home.  Follow-up with primary.    FINAL IMPRESSION    1. Near syncope        ED MEDS  Medications   hydrOXYzine (ATARAX) tablet 50 mg (50 mg Oral Given 6/21/22 1933)       LAB  Labs Ordered and Resulted from Time of ED Arrival to Time of ED Departure   ROUTINE UA WITH MICROSCOPIC REFLEX TO CULTURE - Abnormal       Result Value    Color Urine Colorless      Appearance Urine Clear      Glucose Urine Negative      Bilirubin Urine Negative      Ketones Urine Negative      Specific Gravity Urine 1.004      Blood Urine Negative      pH Urine 8.0 (*)     Protein Albumin Urine Negative      Urobilinogen Urine <2.0      Nitrite Urine Negative      Leukocyte Esterase Urine Negative      RBC Urine <1      WBC Urine 1      Squamous Epithelials Urine <1     BASIC METABOLIC PANEL - Abnormal    Sodium 138      Potassium 3.9      Chloride 103      Carbon Dioxide (CO2) 27      Anion Gap 8      Urea Nitrogen 12      Creatinine 0.51 (*)     Calcium 9.5      Glucose 99      GFR Estimate >90     CBC WITH PLATELETS AND DIFFERENTIAL - Abnormal    WBC Count 8.5      RBC Count 2.93 (*)     Hemoglobin 9.1 (*)      Hematocrit 27.5 (*)     MCV 94      MCH 31.1      MCHC 33.1      RDW 12.9      Platelet Count 428      % Neutrophils 55      % Lymphocytes 29      % Monocytes 10      % Eosinophils 4      % Basophils 1      % Immature Granulocytes 1      NRBCs per 100 WBC 0      Absolute Neutrophils 4.8      Absolute Lymphocytes 2.5      Absolute Monocytes 0.8      Absolute Eosinophils 0.4      Absolute Basophils 0.0      Absolute Immature Granulocytes 0.1      Absolute NRBCs 0.0     TROPONIN I - Normal    Troponin I <0.01     B-TYPE NATRIURETIC PEPTIDE ( [facility] ONLY) - Normal    BNP 44     TROPONIN I - Normal    Troponin I <0.01         RADIOLOGY    XR Chest Port 1 View   Final Result   IMPRESSION: Stable curvature of the thoracic spine. Otherwise negative chest. The lungs are clear. Heart size normal.          DISCHARGE MEDS  New Prescriptions    No medications on file       I, Shaylee Gee, am serving as a scribe to document services personally performed by Dr. Norman, based on my observations and the provider's statements to me. I, Dr. Norman, attest that Shaylee Gee is acting in a scribe capacity, has observed my performance of the services and has documented them in accordance with my direction.    Jaison Norman MD  [facility] EMERGENCY ROOM  [address] [id number] [phone]     Jaison Norman MD  06/21/22 3947       Jaison Norman MD  06/21/22 1364

## 2022-06-23 LAB
ATRIAL RATE - MUSE: 104 BPM
DIASTOLIC BLOOD PRESSURE - MUSE: NORMAL MMHG
INTERPRETATION ECG - MUSE: NORMAL
P AXIS - MUSE: 54 DEGREES
PR INTERVAL - MUSE: 164 MS
QRS DURATION - MUSE: 74 MS
QT - MUSE: 342 MS
QTC - MUSE: 449 MS
R AXIS - MUSE: 40 DEGREES
SYSTOLIC BLOOD PRESSURE - MUSE: NORMAL MMHG
T AXIS - MUSE: 50 DEGREES
VENTRICULAR RATE- MUSE: 104 BPM

## 2022-11-23 ENCOUNTER — HOSPITAL ENCOUNTER (EMERGENCY)
Facility: HOSPITAL | Age: 82
Discharge: HOME | End: 2022-11-23
Attending: EMERGENCY MEDICINE | Admitting: EMERGENCY MEDICINE
Payer: COMMERCIAL

## 2022-11-23 ENCOUNTER — APPOINTMENT (OUTPATIENT)
Dept: RADIOLOGY | Facility: HOSPITAL | Age: 82
End: 2022-11-23
Attending: EMERGENCY MEDICINE
Payer: COMMERCIAL

## 2022-11-23 VITALS
SYSTOLIC BLOOD PRESSURE: 150 MMHG | OXYGEN SATURATION: 99 % | HEIGHT: 65 IN | TEMPERATURE: 98.4 F | WEIGHT: 155 LBS | DIASTOLIC BLOOD PRESSURE: 72 MMHG | HEART RATE: 88 BPM | BODY MASS INDEX: 25.83 KG/M2 | RESPIRATION RATE: 24 BRPM

## 2022-11-23 DIAGNOSIS — I47.10 SVT (SUPRAVENTRICULAR TACHYCARDIA) (H): ICD-10-CM

## 2022-11-23 LAB
ALBUMIN SERPL BCG-MCNC: 4.4 G/DL (ref 3.5–5.2)
ALP SERPL-CCNC: 173 U/L (ref 35–104)
ALT SERPL W P-5'-P-CCNC: 16 U/L (ref 10–35)
AMPHETAMINES UR QL SCN: NORMAL
ANION GAP SERPL CALCULATED.3IONS-SCNC: 14 MMOL/L (ref 7–15)
AST SERPL W P-5'-P-CCNC: 23 U/L (ref 10–35)
BARBITURATES UR QL SCN: NORMAL
BASOPHILS # BLD AUTO: 0 10E3/UL (ref 0–0.2)
BASOPHILS NFR BLD AUTO: 1 %
BENZODIAZ UR QL SCN: NORMAL
BILIRUB DIRECT SERPL-MCNC: <0.2 MG/DL (ref 0–0.3)
BILIRUB SERPL-MCNC: 0.3 MG/DL
BUN SERPL-MCNC: 17.4 MG/DL (ref 8–23)
BZE UR QL SCN: NORMAL
CALCIUM SERPL-MCNC: 9.8 MG/DL (ref 8.8–10.2)
CANNABINOIDS UR QL SCN: NORMAL
CHLORIDE SERPL-SCNC: 99 MMOL/L (ref 98–107)
CREAT SERPL-MCNC: 0.64 MG/DL (ref 0.51–0.95)
DEPRECATED HCO3 PLAS-SCNC: 22 MMOL/L (ref 22–29)
EOSINOPHIL # BLD AUTO: 0.2 10E3/UL (ref 0–0.7)
EOSINOPHIL NFR BLD AUTO: 2 %
ERYTHROCYTE [DISTWIDTH] IN BLOOD BY AUTOMATED COUNT: 14.9 % (ref 10–15)
GFR SERPL CREATININE-BSD FRML MDRD: 87 ML/MIN/1.73M2
GLUCOSE SERPL-MCNC: 108 MG/DL (ref 70–99)
HCT VFR BLD AUTO: 40.6 % (ref 35–47)
HGB BLD-MCNC: 13.5 G/DL (ref 11.7–15.7)
HOLD SPECIMEN: NORMAL
IMM GRANULOCYTES # BLD: 0 10E3/UL
IMM GRANULOCYTES NFR BLD: 0 %
LYMPHOCYTES # BLD AUTO: 2.8 10E3/UL (ref 0.8–5.3)
LYMPHOCYTES NFR BLD AUTO: 33 %
MAGNESIUM SERPL-MCNC: 1.9 MG/DL (ref 1.7–2.3)
MCH RBC QN AUTO: 29.4 PG (ref 26.5–33)
MCHC RBC AUTO-ENTMCNC: 33.3 G/DL (ref 31.5–36.5)
MCV RBC AUTO: 89 FL (ref 78–100)
MONOCYTES # BLD AUTO: 0.7 10E3/UL (ref 0–1.3)
MONOCYTES NFR BLD AUTO: 8 %
NEUTROPHILS # BLD AUTO: 4.9 10E3/UL (ref 1.6–8.3)
NEUTROPHILS NFR BLD AUTO: 56 %
NRBC # BLD AUTO: 0 10E3/UL
NRBC BLD AUTO-RTO: 0 /100
OPIATES UR QL SCN: NORMAL
PCP QUAL URINE (ROCHE): NORMAL
PLATELET # BLD AUTO: 385 10E3/UL (ref 150–450)
POTASSIUM SERPL-SCNC: 3.4 MMOL/L (ref 3.4–5.3)
PROT SERPL-MCNC: 7.4 G/DL (ref 6.4–8.3)
RBC # BLD AUTO: 4.59 10E6/UL (ref 3.8–5.2)
SODIUM SERPL-SCNC: 135 MMOL/L (ref 136–145)
TSH SERPL DL<=0.005 MIU/L-ACNC: 2.8 UIU/ML (ref 0.3–4.2)
WBC # BLD AUTO: 8.6 10E3/UL (ref 4–11)

## 2022-11-23 PROCEDURE — 250N000011 HC RX IP 250 OP 636: Performed by: EMERGENCY MEDICINE

## 2022-11-23 PROCEDURE — 36415 COLL VENOUS BLD VENIPUNCTURE: CPT | Performed by: EMERGENCY MEDICINE

## 2022-11-23 PROCEDURE — 258N000003 HC RX IP 258 OP 636: Performed by: EMERGENCY MEDICINE

## 2022-11-23 PROCEDURE — 96375 TX/PRO/DX INJ NEW DRUG ADDON: CPT

## 2022-11-23 PROCEDURE — 71045 X-RAY EXAM CHEST 1 VIEW: CPT

## 2022-11-23 PROCEDURE — 99285 EMERGENCY DEPT VISIT HI MDM: CPT | Mod: 25

## 2022-11-23 PROCEDURE — 84443 ASSAY THYROID STIM HORMONE: CPT | Performed by: EMERGENCY MEDICINE

## 2022-11-23 PROCEDURE — 96374 THER/PROPH/DIAG INJ IV PUSH: CPT

## 2022-11-23 PROCEDURE — 82248 BILIRUBIN DIRECT: CPT | Performed by: EMERGENCY MEDICINE

## 2022-11-23 PROCEDURE — 80053 COMPREHEN METABOLIC PANEL: CPT | Performed by: EMERGENCY MEDICINE

## 2022-11-23 PROCEDURE — 250N000009 HC RX 250: Performed by: EMERGENCY MEDICINE

## 2022-11-23 PROCEDURE — 85025 COMPLETE CBC W/AUTO DIFF WBC: CPT | Performed by: EMERGENCY MEDICINE

## 2022-11-23 PROCEDURE — 93005 ELECTROCARDIOGRAM TRACING: CPT | Performed by: EMERGENCY MEDICINE

## 2022-11-23 PROCEDURE — 96361 HYDRATE IV INFUSION ADD-ON: CPT

## 2022-11-23 PROCEDURE — 80307 DRUG TEST PRSMV CHEM ANLYZR: CPT | Performed by: EMERGENCY MEDICINE

## 2022-11-23 PROCEDURE — 83735 ASSAY OF MAGNESIUM: CPT | Performed by: EMERGENCY MEDICINE

## 2022-11-23 RX ORDER — METOPROLOL TARTRATE 1 MG/ML
5 INJECTION, SOLUTION INTRAVENOUS ONCE
Status: COMPLETED | OUTPATIENT
Start: 2022-11-23 | End: 2022-11-23

## 2022-11-23 RX ORDER — KETOROLAC TROMETHAMINE 15 MG/ML
15 INJECTION, SOLUTION INTRAMUSCULAR; INTRAVENOUS ONCE
Status: COMPLETED | OUTPATIENT
Start: 2022-11-23 | End: 2022-11-23

## 2022-11-23 RX ADMIN — SODIUM CHLORIDE 1000 ML: 9 INJECTION, SOLUTION INTRAVENOUS at 15:54

## 2022-11-23 RX ADMIN — KETOROLAC TROMETHAMINE 15 MG: 15 INJECTION, SOLUTION INTRAMUSCULAR; INTRAVENOUS at 18:22

## 2022-11-23 RX ADMIN — METOPROLOL TARTRATE 5 MG: 5 INJECTION INTRAVENOUS at 16:05

## 2022-11-23 ASSESSMENT — ENCOUNTER SYMPTOMS
ABDOMINAL PAIN: 0
FEVER: 0
DIARRHEA: 0
CONFUSION: 0
HEMATURIA: 0
DIZZINESS: 1
SHORTNESS OF BREATH: 1
DYSURIA: 0
NAUSEA: 0
CHILLS: 0
SORE THROAT: 0
JOINT SWELLING: 0
PALPITATIONS: 1
VOMITING: 0

## 2022-11-23 ASSESSMENT — ACTIVITIES OF DAILY LIVING (ADL): ADLS_ACUITY_SCORE: 35

## 2022-11-23 NOTE — ED PROVIDER NOTES
Emergency Department Encounter     Evaluation Date & Time:   11/23/2022  3:39 PM    CHIEF COMPLAINT:  Cardiac Rhythm Problem      Triage Note:  Presents via [facility] from [employer] for c/o dizziness and feeling faint.  Pt undergoing stress from a death of dtr, ovarian CA.  Found to have intermittent episodes of elevated HR into the 200s.  She's been anxious lately, prescribed ativan.  When HR increases, pt will get SOB, faint, and dizzy.  She's had a couple of episodes while in ER 1.                 ED COURSE & MEDICAL DECISION MAKING:     ED Course as of 11/23/22 2055 Wed Nov 23, 2022   1649 Labs all unremarkable.    CXR (independent interpretation): no acute cardiopulmonary process    1747 Pt remains in NSR without recurrent SVT.  Plan for rapid access cardiology follow up.  Further information provided from family as pt has been taking ativan 3 times a day for past month for anxiety related to recent death of family member.  Pt did not have last night's or today's dose.  Certainly this could be contributing with some withdrawal.  Pt remains well here, no seizures.  Discussed with her continuing medication for now, but working with primary to taper and hopefully stop with better long-term anxiety management.       Pt presenting for evaluation of intermittent racing heart that started today, had dizziness last night but no symptoms like she's having on arrival here.  Pt with intermittent episodes where HR jumps to around 200, consistent with SVT appearance on monitor/EKG.  This is very transient, lasting <15 seconds, returns to NSR afterwards.  Pt with no prior cardiac history, no daily meds. She has recently been fighting a URI with congestion for the past 3 weeks, previously on antibx she finished and also previously taking OTC cold preparation medications. Certainly could be a stimulant in those that predisposed this. Will get labs, hydrate, single dose of IV lopressor and reassess.      3:49 PM Provider  called to room. Met with patient for initial interview and exam. Discussed initial plan for care for their stay in the emergency department.  PPE: Provider wore gloves, N95 mask  4:56 PM Rechecked patient. Examined ears per her request.  Pt remains in NSR, feels much better.  She had many questions about treatment of her sinus congestion, which I answered and provided tips on management that doesn't involve potential cold/cough preparations with stimulants.     At the conclusion of the encounter I discussed the results of all the tests and the disposition. The questions were answered. The patient or family acknowledged understanding and was agreeable with the care plan.      MEDICATIONS GIVEN IN THE EMERGENCY DEPARTMENT:  Medications   0.9% sodium chloride BOLUS (0 mLs Intravenous Stopped 11/23/22 0144)   metoprolol (LOPRESSOR) injection 5 mg (5 mg Intravenous Given 11/23/22 1605)   ketorolac (TORADOL) injection 15 mg (15 mg Intravenous Given 11/23/22 1822)       NEW PRESCRIPTIONS STARTED AT TODAY'S ED VISIT:  Discharge Medication List as of 11/23/2022  6:30 PM          HPI   The history is provided by the patient.        Anabelle Cameron is a 81 year old female with a pertinent history of meningioma, mixed HLD, GERD who presents to this ED via EMS for evaluation of dizziness.     Per EMS patient coming from insurance company following the death of the patient's daughter. Patient endorsed feeling dizzy, short of breath and faint. Per nurse patient here in the ED with what appears to be runs of ventricular tachycardia and heart rate up to 200.     Per patient she reports having been sick the past three weeks with upper respiratory like symptoms as well as a ear infection. She notes she has taken antibiotics for this and over the counter cough and cold medication but is not currently on these. She endorses her ears still feeling clogged. Patient reports having first developed dizziness yesterday but states the symptom  being manageable and intermittent in nature. She reports having taken a ibuprofen this morning. She notes today having acutely worsening dizziness with heart racing and feeling as if she is going to syncopize with shortness of breath. Patient note this occurs every time she experiences an episode with her heart racing. She denies any vomiting, diarrhea, fever, chest pain, difficulties urinating as well as any other complaints at the time.     REVIEW OF SYSTEMS:  Review of Systems   Constitutional: Negative for chills and fever.   HENT: Negative for sore throat.    Eyes: Negative for visual disturbance.   Respiratory: Positive for shortness of breath.    Cardiovascular: Positive for palpitations (episodes of heart racing with associated shortness of breath). Negative for chest pain.   Gastrointestinal: Negative for abdominal pain, diarrhea, nausea and vomiting.   Endocrine: Negative for polyuria.   Genitourinary: Negative for dysuria and hematuria.        - urinary changes     Musculoskeletal: Negative for joint swelling.   Skin: Negative for rash.   Neurological: Positive for dizziness (worsening dizziness).   Psychiatric/Behavioral: Negative for confusion.   All other systems reviewed and are negative.    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    Social History     Substance Use Topics     Alcohol use: Yes     Comment: social     Drug use: No       Fexofenadine HCl (ALLEGRA  "PO)  multivitamin, therapeutic with minerals (THERA-VIT-M) TABS  omeprazole (PRILOSEC) 20 MG DR capsule  polyethylene glycol (MIRALAX) 17 GM/Dose powder  SENNA-docusate sodium (SENNA S) 8.6-50 MG tablet  valACYclovir (VALTREX) 1000 mg tablet        Physical Exam     Vitals:  BP (!) 150/72   Pulse 88   Temp 98.4  F (36.9  C) (Oral)   Resp 24   Ht 1.651 m (5' 5\")   Wt 70.3 kg (155 lb)   SpO2 99%   BMI 25.79 kg/m      PHYSICAL EXAM:   Physical Exam  Vitals and nursing note reviewed.   Constitutional:       General: She is not in acute distress.     Appearance: Normal appearance.      Comments: Anxious appearing   HENT:      Head: Normocephalic and atraumatic.      Right Ear: Tympanic membrane is bulging. Tympanic membrane is not perforated or erythematous.      Left Ear: Tympanic membrane is not perforated, erythematous or bulging.      Nose: Nose normal.      Mouth/Throat:      Mouth: Mucous membranes are moist.   Eyes:      Pupils: Pupils are equal, round, and reactive to light.   Neck:      Vascular: No JVD.   Cardiovascular:      Rate and Rhythm: Regular rhythm. Tachycardia present.      Pulses: Normal pulses.           Radial pulses are 2+ on the right side and 2+ on the left side.        Dorsalis pedis pulses are 2+ on the right side and 2+ on the left side.   Pulmonary:      Effort: Pulmonary effort is normal. No respiratory distress.      Breath sounds: Normal breath sounds.   Abdominal:      Palpations: Abdomen is soft.      Tenderness: There is no abdominal tenderness.   Musculoskeletal:      Cervical back: Full passive range of motion without pain and neck supple.      Comments: No calf tenderness or swelling b/l   Skin:     General: Skin is warm.      Findings: No rash.   Neurological:      General: No focal deficit present.      Mental Status: She is alert. Mental status is at baseline.      Comments: Fluent speech, no acute lateralizing deficits   Psychiatric:         Mood and Affect: Mood is " anxious.         Behavior: Behavior normal.           Results     LAB:  All pertinent labs reviewed and interpreted  Labs Ordered and Resulted from Time of ED Arrival to Time of ED Departure   BASIC METABOLIC PANEL - Abnormal       Result Value    Sodium 135 (*)     Potassium 3.4      Chloride 99      Carbon Dioxide (CO2) 22      Anion Gap 14      Urea Nitrogen 17.4      Creatinine 0.64      Calcium 9.8      Glucose 108 (*)     GFR Estimate 87     HEPATIC FUNCTION PANEL - Abnormal    Protein Total 7.4      Albumin 4.4      Bilirubin Total 0.3      Alkaline Phosphatase 173 (*)     AST 23      ALT 16      Bilirubin Direct <0.20     MAGNESIUM - Normal    Magnesium 1.9     TSH WITH FREE T4 REFLEX - Normal    TSH 2.80     DRUG ABUSE SCREEN 77 URINE (FL, RH, SH) - Normal    Amphetamines Urine Screen Negative      Barbituates Urine Screen Negative      Benzodiazepine Urine Screen Negative      Cannabinoids Urine Screen Negative      Opiates Urine Screen Negative      PCP Urine Screen Negative      Cocaine Urine Screen Negative     CBC WITH PLATELETS AND DIFFERENTIAL    WBC Count 8.6      RBC Count 4.59      Hemoglobin 13.5      Hematocrit 40.6      MCV 89      MCH 29.4      MCHC 33.3      RDW 14.9      Platelet Count 385      % Neutrophils 56      % Lymphocytes 33      % Monocytes 8      % Eosinophils 2      % Basophils 1      % Immature Granulocytes 0      NRBCs per 100 WBC 0      Absolute Neutrophils 4.9      Absolute Lymphocytes 2.8      Absolute Monocytes 0.7      Absolute Eosinophils 0.2      Absolute Basophils 0.0      Absolute Immature Granulocytes 0.0      Absolute NRBCs 0.0         RADIOLOGY:  XR Chest Port 1 View   Final Result   IMPRESSION: The lungs are clear of focal consolidation. There is no pneumothorax or pleural effusion. There is a rounded retrocardiac density consistent with a small hiatal hernia. The heart size and pulmonary vascularity are normal. There is curvature    of the thoracic spine convex to  the right. No acute bony fracture.                   ECG:  EKG 1: Sinus tach, rate 104, normal intervals, no acute ischemia  EKG 2: SVT, rate 207, rate related ischemia only      I have independently reviewed and interpreted the EKG(s) documented above     PROCEDURES:  Procedures:  none      FINAL IMPRESSION:    ICD-10-CM    1. SVT (supraventricular tachycardia) (H)  I47.1 Rapid Access Clinic  Referral          0 minutes of critical care time      I, Georgie Asencio, am serving as a scribe to document services personally performed by Dr. Zeeshan South, based on my observations and the provider's statements to me. I, Zeeshan South, DO attest that Georgie Asencio is acting in a scribe capacity, has observed my performance of the services and has documented them in accordance with my direction.      Zeeshan South DO  Emergency Medicine  [facility] EMERGENCY DEPARTMENT  11/23/2022  3:55 PM        Zeeshan South MD  11/23/22 2055

## 2022-11-23 NOTE — DISCHARGE INSTRUCTIONS
Try and avoid over the counter cold medications that contain stimulants.  Take Zyrtec 10mg daily and use flonase as directed with trial of Neti Pot to help clear sinuses better. Follow up closely with primary clinic for re-evaluation and ongoing cares, including to discuss your lorazepam medication.  Do not stop this medication, continue to take until you can follow up with your primary clinic and discuss possible tapering or other treatments of your anxiety.  I also put in for rapid access cardiology to see you closely, so someone should contact you to bring you in for further evaluation and workup of episodes that occurred today.  If these episodes return, come back to the ER.

## 2022-11-23 NOTE — ED NOTES
Bed: JNED-01  Expected date:   Expected time:   Means of arrival:   Comments:  Juan Alberto Guerrero y F Dizzy, runs of Vtach

## 2022-11-23 NOTE — ED TRIAGE NOTES
Presents via [facility] from [employer] for c/o dizziness and feeling faint.  Pt undergoing stress from a death of dtr, ovarian CA.  Found to have intermittent episodes of elevated HR into the 200s.  She's been anxious lately, prescribed ativan.  When HR increases, pt will get SOB, faint, and dizzy.  She's had a couple of episodes while in ER 1.           Triage Assessment     Row Name 11/23/22 6456       Triage Assessment (Adult)    Airway WDL WDL       Respiratory WDL    Respiratory WDL WDL       Skin Circulation/Temperature WDL    Skin Circulation/Temperature WDL WDL       Cardiac WDL    Cardiac WDL X;all    Cardiac Rhythm ST       Peripheral/Neurovascular WDL    Peripheral Neurovascular WDL WDL       Cognitive/Neuro/Behavioral WDL    Cognitive/Neuro/Behavioral WDL WDL

## 2022-11-23 NOTE — ED NOTES
DIL and  at the bedside.  Reports having viral illness for the past few weeks.  Currently having palpitations, SOB, dizziness, faint feeling, clammy skin.

## 2022-11-24 NOTE — ED NOTES
ER DISCHARGE NOTE:   Anabelle Cameron MRN: [id number]      Anabelle Cameron is discharged from the emergency room.    (I47.1) SVT (supraventricular tachycardia) (H)      Anabelle Cameron discharged via on foot with DIL and spouse.    Verbalized understanding of discharge instructions.   Prescriptions: none prescribed.    AVS in hand.

## 2022-12-01 ENCOUNTER — OFFICE VISIT (OUTPATIENT)
Dept: CARDIOLOGY | Facility: CLINIC | Age: 82
End: 2022-12-01
Payer: COMMERCIAL

## 2022-12-01 VITALS
SYSTOLIC BLOOD PRESSURE: 118 MMHG | DIASTOLIC BLOOD PRESSURE: 82 MMHG | WEIGHT: 155 LBS | BODY MASS INDEX: 25.83 KG/M2 | HEIGHT: 65 IN | RESPIRATION RATE: 16 BRPM | HEART RATE: 100 BPM

## 2022-12-01 DIAGNOSIS — R00.0 WIDE-COMPLEX TACHYCARDIA: Primary | ICD-10-CM

## 2022-12-01 PROCEDURE — 99204 OFFICE O/P NEW MOD 45 MIN: CPT | Performed by: INTERNAL MEDICINE

## 2022-12-01 RX ORDER — OMEPRAZOLE 40 MG/1
40 CAPSULE, DELAYED RELEASE ORAL DAILY
COMMUNITY
Start: 2022-03-10

## 2022-12-01 RX ORDER — POLYETHYLENE GLYCOL 3350 17 G/17G
17 POWDER, FOR SOLUTION ORAL PRN
COMMUNITY
End: 2022-12-01

## 2022-12-01 RX ORDER — IBUPROFEN 200 MG
800 TABLET ORAL DAILY
COMMUNITY
End: 2023-01-01

## 2022-12-01 RX ORDER — LORAZEPAM 0.5 MG/1
TABLET ORAL
COMMUNITY
Start: 2022-11-28 | End: 2023-07-03

## 2022-12-01 RX ORDER — FLUTICASONE PROPIONATE 50 MCG
1 SPRAY, SUSPENSION (ML) NASAL DAILY PRN
COMMUNITY

## 2022-12-01 RX ORDER — GLUC/MSM/COLGN2/HYAL/ANTIARTH3 375-375-20
1 TABLET ORAL DAILY
COMMUNITY
End: 2023-09-17

## 2022-12-01 RX ORDER — PRAVASTATIN SODIUM 10 MG
10 TABLET ORAL AT BEDTIME
COMMUNITY
Start: 2022-10-12 | End: 2023-01-02

## 2022-12-01 RX ORDER — CETIRIZINE HYDROCHLORIDE 10 MG/1
10 TABLET ORAL DAILY PRN
COMMUNITY
End: 2023-03-13

## 2022-12-01 NOTE — LETTER
2022    Jenise Christensen MD  [address]  [facility] [address]    RE: Anabelle Cameron       Dear Colleague,     I had the [facility].     [facility]  Cardiac Electrophysiology  [address]  [place] [address]   Office: [phone]  Fax: [phone]     Cardiac Electrophysiology Consultation    Patient: Anabelle Cameron   : 1940     Referring Provider: No ref. provider found  Primary Care Provider: Clinic, [facility]    CHIEF COMPLAINT/REASON FOR CONSULTATION  Wide complex tachycardia    Assessment/Recommendations   Anabelle Cameron is a 81 year old female with syncope, meningioma, dyslipidemia, degenerative disk disease, anxiety referred for consultation regarding wide complex tachycardia.    Wide complex tachycardia - associated with palpitations, noted for the first time on 2022.  Unclear if SVT with aberrancy (typical RBBB morphology) vs fascicular VT, isolated episodes 2022  - 30d MCT  - cardiac MRI   - follow-up visit in 2 months to review data and plan further  - further options may include ongoing observation, initiation of medical therapy (eg. Metoprolol), or invasive assessment and ablation    Syncope - episodes sound vagally mediated in nature  - expectant management for now, can consider ILR in case of frequent recurrences    Follow up: as above         History of Present Illness   Anabelle Cameron is a 81 year old female with tachycardia, syncope, meningioma, dyslipidemia, degenerative disk disease, anxiety referred for consultation regarding tachycardia.    Mrs. Cameron notes acute first onset of palpitations and lightheadedness while a passenger in a car in the setting of several weeks of URI symptoms, and on 2022 underwent ER evaluation with note of episodes of regular RBBB tachycardia lasting for 15s.  She was treated with metoprolol 5mg IV with  "subsequent quiescence, and was discharged home without additional prescription.  She has not had recurrence of palpitations since that time.    She reports approximately a few prior episodes of syncope (she cannot state how many) at times of dehydration of stress (first episode in , at her father's ) - these have not been associated with palpitations.  She has not sustained any prior injuries.    Her daughter passed away from cancer in 10/2022.       Physical Examination  Review of Systems   VITALS: /82 (BP Location: Right arm, Patient Position: Sitting, Cuff Size: Adult Regular)   Pulse 100   Resp 16   Ht 1.651 m (5' 5\")   Wt 70.3 kg (155 lb)   BMI 25.79 kg/m    Wt Readings from Last 3 Encounters:   22 70.3 kg (155 lb)   22 70.3 kg (155 lb)   22 70.3 kg (155 lb)     CONSTITUTIONAL: well nourished, comfortable, no distress  EYES:  Conjunctivae pink, sclerae clear.    E/N/T:  Oral mucosa pink  RESPIRATORY:  Respiratory effort is normal  CARDIOVASCULAR:  normal S1 and S2  GASTROINTESTINAL:  Abdomen without masses or tenderness  EXTREMITIES:  No clubbing or cyanosis.    MUSCULOSKELETAL:  Overall grossly normal muscle strength  SKIN:  Overall, skin warm and dry, no lesions.  NEURO/PSYCH:  Oriented x 3 with normal affect.   Constitutional:  No weight loss or loss of appetite    Eyes:  No difficulty with vision, no double vision, no dry eyes  ENT:  No sore throat, difficulty swallowing; changes in hearing or tinnitus  Cardiovascular: As detailed above  Respiratory:  No cough  Musculoskeletal  No joint pain, muscle aches  Neurologic:  No syncope, lightheadedness, fainting spells   Hematologic: No easy bruising, excessive bleeding tendency   Gastrointestinal:  No jaundice, abdominal pain or abdominal bloating  Genitourinary: No changes in urinary habits, no trouble urinating    Psychiatric: No anxiety or depression      Medical History  Surgical History   Past Medical History: "   Diagnosis Date     Acute pancreatitis 2013     Allergy      DDD (degenerative disc disease), cervical 2008     Depressive disorder      Dysuria 2021     GERD (gastroesophageal reflux disease) 2010     Hip bursitis 2012     Meningioma (H) 2010     Osteoarthrosis 2013     Syncope      Venous tributary (branch) occlusion of retina 2021    Formatting of this note might be different from the original. LEFT EYE Occurred at time of IOL/ERM    Past Surgical History:   Procedure Laterality Date     APPENDECTOMY OPEN       GYN SURGERY       ORTHOPEDIC SURGERY           Family History Social History   No family history on file.     Social History     Tobacco Use     Smoking status: Former     Types: Cigarettes     Quit date:      Years since quittin.9     Smokeless tobacco: Never   Vaping Use     Vaping Use: Never used   Substance Use Topics     Alcohol use: Never     Comment: social     Drug use: No         Medications  Allergies     Current Outpatient Medications:      Fexofenadine HCl (ALLEGRA PO), Take 60 mg by mouth daily as needed , Disp: , Rfl:      multivitamin, therapeutic with minerals (THERA-VIT-M) TABS, Take 1 tablet by mouth daily.  , Disp: , Rfl:      omeprazole (PRILOSEC) 20 MG DR capsule, Take 40 mg by mouth daily , Disp: , Rfl:      polyethylene glycol (MIRALAX) 17 GM/Dose powder, Take 17 g by mouth daily as needed for constipation, Disp: 510 g, Rfl:      SENNA-docusate sodium (SENNA S) 8.6-50 MG tablet, Take 1 tablet by mouth 2 times daily, Disp: , Rfl:      valACYclovir (VALTREX) 1000 mg tablet, Take 1 g by mouth 3 times daily Started 7/15/2021 x 7 days, Disp: , Rfl:      Allergies   Allergen Reactions     Levofloxacin Other (See Comments), Dizziness and Nausea     dizziness  Patient unable to tolerate side effects       Hydrocodone-Acetaminophen Other (See Comments)     Dizziness, fainting  Other reaction(s): Syncope  Vasovagal episode          Oxycodone-Acetaminophen Other (See Comments)     Dizziness, fainting          Lab Results    Chemistry CBC Cardiac Enzymes/BNP/TSH/INR   Recent Labs   Lab Test 11/23/22  1544   *   POTASSIUM 3.4   CHLORIDE 99   CO2 22   *   BUN 17.4   CR 0.64   GFRESTIMATED 87   KINJAL 9.8     Recent Labs   Lab Test 11/23/22  1544 06/21/22 2001 07/18/21  0551   CR 0.64 0.51* 0.63          Recent Labs   Lab Test 11/23/22  1544   WBC 8.6   HGB 13.5   HCT 40.6   MCV 89        Recent Labs   Lab Test 11/23/22  1544 06/21/22 2001 07/18/21  0551   HGB 13.5 9.1* 11.8    Recent Labs   Lab Test 06/21/22  2301 06/21/22 2001 07/18/21  0551   TROPONINI <0.01 <0.01 <0.01     Recent Labs   Lab Test 06/21/22 2001   BNP 44     Recent Labs   Lab Test 11/23/22  1544   TSH 2.80     No results for input(s): INR in the last 72250 hours.      Data Review    ECGs (tracings independently reviewed)  11/23/2022 - short RP tachycardia, typical RBBB QRS 110ms  11/23/2022 - SR 104bpm, QRS 72ms    7/18/2021 TTE  1. Normal left ventricular size and systolic performance with a visually  estimated ejection fraction of 65%.  2. No significant valvular heart disease is identified on this study.  3. Normal right ventricular size and systolic performance.       45 minutes was spent reviewing the chart and in direct discussion with the patient.    Cc: PMD    Saúl Hanley MD  12/1/2022  11:47 AM    Thank you for allowing me to participate in the care of your patient.      Sincerely,     Saúl Hanely MD     [facility]  cc:   No referring provider defined for this encounter.

## 2022-12-01 NOTE — PROGRESS NOTES
[facility]  Cardiac Electrophysiology  [address]  [place] [address]   Office: [phone]  Fax: [phone]     Cardiac Electrophysiology Consultation    Patient: Anabelle Cameron   : 1940     Referring Provider: No ref. provider found  Primary Care Provider: Clinic, [facility]    CHIEF COMPLAINT/REASON FOR CONSULTATION  Wide complex tachycardia    Assessment/Recommendations   Anabelle Cameron is a 81 year old female with syncope, meningioma, dyslipidemia, degenerative disk disease, anxiety referred for consultation regarding wide complex tachycardia.    Wide complex tachycardia - associated with palpitations, noted for the first time on 2022.  Unclear if SVT with aberrancy (typical RBBB morphology) vs fascicular VT, isolated episodes 2022  - 30d MCT  - cardiac MRI   - follow-up visit in 2 months to review data and plan further  - further options may include ongoing observation, initiation of medical therapy (eg. Metoprolol), or invasive assessment and ablation    Syncope - episodes sound vagally mediated in nature  - expectant management for now, can consider ILR in case of frequent recurrences    Follow up: as above         History of Present Illness   Anabelle Cameron is a 81 year old female with tachycardia, syncope, meningioma, dyslipidemia, degenerative disk disease, anxiety referred for consultation regarding tachycardia.    Mrs. Cameron notes acute first onset of palpitations and lightheadedness while a passenger in a car in the setting of several weeks of URI symptoms, and on 2022 underwent ER evaluation with note of episodes of regular RBBB tachycardia lasting for 15s.  She was treated with metoprolol 5mg IV with subsequent quiescence, and was discharged home without additional prescription.  She has not had recurrence of palpitations since that time.    She reports approximately a few prior episodes of syncope (she cannot  "state how many) at times of dehydration of stress (first episode in , at her father's ) - these have not been associated with palpitations.  She has not sustained any prior injuries.    Her daughter passed away from cancer in 10/2022.       Physical Examination  Review of Systems   VITALS: /82 (BP Location: Right arm, Patient Position: Sitting, Cuff Size: Adult Regular)   Pulse 100   Resp 16   Ht 1.651 m (5' 5\")   Wt 70.3 kg (155 lb)   BMI 25.79 kg/m    Wt Readings from Last 3 Encounters:   22 70.3 kg (155 lb)   22 70.3 kg (155 lb)   22 70.3 kg (155 lb)     CONSTITUTIONAL: well nourished, comfortable, no distress  EYES:  Conjunctivae pink, sclerae clear.    E/N/T:  Oral mucosa pink  RESPIRATORY:  Respiratory effort is normal  CARDIOVASCULAR:  normal S1 and S2  GASTROINTESTINAL:  Abdomen without masses or tenderness  EXTREMITIES:  No clubbing or cyanosis.    MUSCULOSKELETAL:  Overall grossly normal muscle strength  SKIN:  Overall, skin warm and dry, no lesions.  NEURO/PSYCH:  Oriented x 3 with normal affect.   Constitutional:  No weight loss or loss of appetite    Eyes:  No difficulty with vision, no double vision, no dry eyes  ENT:  No sore throat, difficulty swallowing; changes in hearing or tinnitus  Cardiovascular: As detailed above  Respiratory:  No cough  Musculoskeletal  No joint pain, muscle aches  Neurologic:  No syncope, lightheadedness, fainting spells   Hematologic: No easy bruising, excessive bleeding tendency   Gastrointestinal:  No jaundice, abdominal pain or abdominal bloating  Genitourinary: No changes in urinary habits, no trouble urinating    Psychiatric: No anxiety or depression      Medical History  Surgical History   Past Medical History:   Diagnosis Date     Acute pancreatitis 2013     Allergy      DDD (degenerative disc disease), cervical 2008     Depressive disorder      Dysuria 2021     GERD (gastroesophageal reflux disease) 2010 "     Hip bursitis 2012     Meningioma (H) 2010     Osteoarthrosis 2013     Syncope      Venous tributary (branch) occlusion of retina 2021    Formatting of this note might be different from the original. LEFT EYE Occurred at time of IOL/ERM    Past Surgical History:   Procedure Laterality Date     APPENDECTOMY OPEN       GYN SURGERY       ORTHOPEDIC SURGERY           Family History Social History   No family history on file.     Social History     Tobacco Use     Smoking status: Former     Types: Cigarettes     Quit date:      Years since quittin.9     Smokeless tobacco: Never   Vaping Use     Vaping Use: Never used   Substance Use Topics     Alcohol use: Never     Comment: social     Drug use: No         Medications  Allergies     Current Outpatient Medications:      Fexofenadine HCl (ALLEGRA PO), Take 60 mg by mouth daily as needed , Disp: , Rfl:      multivitamin, therapeutic with minerals (THERA-VIT-M) TABS, Take 1 tablet by mouth daily.  , Disp: , Rfl:      omeprazole (PRILOSEC) 20 MG DR capsule, Take 40 mg by mouth daily , Disp: , Rfl:      polyethylene glycol (MIRALAX) 17 GM/Dose powder, Take 17 g by mouth daily as needed for constipation, Disp: 510 g, Rfl:      SENNA-docusate sodium (SENNA S) 8.6-50 MG tablet, Take 1 tablet by mouth 2 times daily, Disp: , Rfl:      valACYclovir (VALTREX) 1000 mg tablet, Take 1 g by mouth 3 times daily Started 7/15/2021 x 7 days, Disp: , Rfl:      Allergies   Allergen Reactions     Levofloxacin Other (See Comments), Dizziness and Nausea     dizziness  Patient unable to tolerate side effects       Hydrocodone-Acetaminophen Other (See Comments)     Dizziness, fainting  Other reaction(s): Syncope  Vasovagal episode         Oxycodone-Acetaminophen Other (See Comments)     Dizziness, fainting          Lab Results    Chemistry CBC Cardiac Enzymes/BNP/TSH/INR   Recent Labs   Lab Test 22  1544   *   POTASSIUM 3.4   CHLORIDE 99   CO2 22   GLC  108*   BUN 17.4   CR 0.64   GFRESTIMATED 87   KINJAL 9.8     Recent Labs   Lab Test 11/23/22  1544 06/21/22 2001 07/18/21  0551   CR 0.64 0.51* 0.63          Recent Labs   Lab Test 11/23/22  1544   WBC 8.6   HGB 13.5   HCT 40.6   MCV 89        Recent Labs   Lab Test 11/23/22  1544 06/21/22 2001 07/18/21  0551   HGB 13.5 9.1* 11.8    Recent Labs   Lab Test 06/21/22  2301 06/21/22 2001 07/18/21  0551   TROPONINI <0.01 <0.01 <0.01     Recent Labs   Lab Test 06/21/22 2001   BNP 44     Recent Labs   Lab Test 11/23/22  1544   TSH 2.80     No results for input(s): INR in the last 52020 hours.      Data Review    ECGs (tracings independently reviewed)  11/23/2022 - short RP tachycardia 207bpm, typical RBBB QRS 110ms  11/23/2022 - SR 104bpm, QRS 72ms    7/18/2021 TTE  1. Normal left ventricular size and systolic performance with a visually  estimated ejection fraction of 65%.  2. No significant valvular heart disease is identified on this study.  3. Normal right ventricular size and systolic performance.       45 minutes was spent reviewing the chart and in direct discussion with the patient.    Cc: PMDIANNE Hanley MD  12/1/2022  11:47 AM

## 2022-12-01 NOTE — PATIENT INSTRUCTIONS
[facility]  Cardiac Electrophysiology  [address]  [place] [address]   Office: [phone]  Fax: [phone]       Thank you for seeing us in clinic today - it is a pleasure to be a part of your care team.  Below is a summary of our plan from today's visit.      You have had episodes of tachycardia which led to ER evaluation 11/23/2022, at which time short salvos of tachycardia were noted and treated with a low dose of metoprolol intravenously.  The tachycardia episodes most likely represent supraventricular tachycardia (SVT), though we cannot entirely rule out a ventricular tachycardia.  We will plan for the following evaluation:  - 30 day cardiac rhythm monitor to assess for recurrent tachycardia episodes  - cardiac MRI to assess for any dysfunction or scarring within the ventricles  - follow-up visit in 2 months to review data and plan further  - further options may include ongoing observation, initiation of medical therapy (eg. Metoprolol), or invasive assessment and ablation    Please do not hesitate to be in touch with our office at [phone] with any questions that may arise.      Thank you for trusting us with your care,    Saúl Hanley MD  Clinical Cardiac Electrophysiology  [facility]  [address]  [place] [address]   Office: [phone]  Fax: [phone]

## 2022-12-06 ENCOUNTER — HOSPITAL ENCOUNTER (OUTPATIENT)
Dept: GENERAL RADIOLOGY | Facility: CLINIC | Age: 82
Discharge: HOME | End: 2022-12-06
Attending: PHYSICIAN ASSISTANT | Admitting: PHYSICIAN ASSISTANT
Payer: COMMERCIAL

## 2022-12-06 VITALS — SYSTOLIC BLOOD PRESSURE: 149 MMHG | OXYGEN SATURATION: 100 % | HEART RATE: 110 BPM | DIASTOLIC BLOOD PRESSURE: 68 MMHG

## 2022-12-06 DIAGNOSIS — M15.9 OSTEOARTHRITIS OF MULTIPLE JOINTS, UNSPECIFIED OSTEOARTHRITIS TYPE: ICD-10-CM

## 2022-12-06 PROCEDURE — 77002 NEEDLE LOCALIZATION BY XRAY: CPT

## 2022-12-06 PROCEDURE — 255N000002 HC RX 255 OP 636: Performed by: PHYSICIAN ASSISTANT

## 2022-12-06 PROCEDURE — 250N000009 HC RX 250: Performed by: PHYSICIAN ASSISTANT

## 2022-12-06 PROCEDURE — 250N000011 HC RX IP 250 OP 636: Performed by: PHYSICIAN ASSISTANT

## 2022-12-06 RX ORDER — IOPAMIDOL 408 MG/ML
10 INJECTION, SOLUTION INTRATHECAL ONCE
Status: COMPLETED | OUTPATIENT
Start: 2022-12-06 | End: 2022-12-06

## 2022-12-06 RX ORDER — TRIAMCINOLONE ACETONIDE 40 MG/ML
80 INJECTION, SUSPENSION INTRA-ARTICULAR; INTRAMUSCULAR ONCE
Status: COMPLETED | OUTPATIENT
Start: 2022-12-06 | End: 2022-12-06

## 2022-12-06 RX ORDER — ETHYL CHLORIDE 100 %
1 AEROSOL, SPRAY (ML) TOPICAL ONCE
Status: COMPLETED | OUTPATIENT
Start: 2022-12-06 | End: 2022-12-06

## 2022-12-06 RX ORDER — LIDOCAINE HYDROCHLORIDE 10 MG/ML
30 INJECTION, SOLUTION EPIDURAL; INFILTRATION; INTRACAUDAL; PERINEURAL ONCE
Status: COMPLETED | OUTPATIENT
Start: 2022-12-06 | End: 2022-12-06

## 2022-12-06 RX ORDER — TRIAMCINOLONE ACETONIDE 40 MG/ML
40 INJECTION, SUSPENSION INTRA-ARTICULAR; INTRAMUSCULAR ONCE
Status: DISCONTINUED | OUTPATIENT
Start: 2022-12-06 | End: 2022-12-06 | Stop reason: CLARIF

## 2022-12-06 RX ORDER — BUPIVACAINE HYDROCHLORIDE 5 MG/ML
30 INJECTION, SOLUTION EPIDURAL; INTRACAUDAL ONCE
Status: COMPLETED | OUTPATIENT
Start: 2022-12-06 | End: 2022-12-06

## 2022-12-06 RX ADMIN — TRIAMCINOLONE ACETONIDE 80 MG: 40 INJECTION, SUSPENSION INTRA-ARTICULAR; INTRAMUSCULAR at 10:38

## 2022-12-06 RX ADMIN — BUPIVACAINE HYDROCHLORIDE 3 ML: 5 INJECTION, SOLUTION EPIDURAL; INTRACAUDAL; PERINEURAL at 10:38

## 2022-12-06 RX ADMIN — IOPAMIDOL 4 ML: 408 INJECTION, SOLUTION INTRATHECAL at 10:37

## 2022-12-06 RX ADMIN — LIDOCAINE HYDROCHLORIDE 6 ML: 10 INJECTION, SOLUTION EPIDURAL; INFILTRATION; INTRACAUDAL; PERINEURAL at 10:36

## 2022-12-06 RX ADMIN — Medication 1 APPLICATION.: at 10:23

## 2022-12-06 NOTE — DISCHARGE INSTRUCTIONS
JOINT STEROID INJECTION DISCHARGE INSTRUCTIONS    What to expect  After the procedure, you may feel some temporary relief from the local anesthetic, but that will likely wear off within a few hours. Your symptoms may then return to pre-procedure level, or even be temporarily worse for a day or two.  For many people, the steroid begins to provide some relief within 2-3 days, but it can take up to 2 weeks. If you have no improvement in your symptoms after two weeks, please contact your referring provider to discuss next steps.  What to do  Minimize your activity today. You may resume your normal activity tomorrow as tolerated. Avoid vigorous or strenuous activity until your symptoms improve, or as directed by your referring provider.   You may shower tomorrow, but do not submerge in bathtub, hot tub or pool for 48 hours.    Use ice packs as needed for discomfort.  You may resume all medications, including blood thinners.  You may take over the counter acetaminophen (Tylenol) or ibuprofen (Motrin, Advil) for mild discomfort after the procedure.    What to watch for  Bruising or slight swelling at the puncture site can be normal. If you begin to develop redness or excessive swelling at the site, fever over 1010F, notable increase in pain, weakness, or numbness, please contact your primary care or referring provider.  Side effects from the steroid are often mild and go away in a few days. Common side effects may include facial flushing, restlessness, irritability, difficulty sleeping, elevated blood pressure, and increased blood sugar.   If you have diabetes, monitor your blood sugar closely. Contact the provider who manages your diabetes to help you control your blood sugar if needed.  If you have questions or concerns  You may contact the [facility] Radiology Department at [phone] between 8am-4:30pm Monday through Friday.  If you have urgent questions outside of these normal business hours, please contact  the [facility] on-call physician at [phone].    The provider who performed your procedure was Neto Quiroz PA-C

## 2022-12-14 ENCOUNTER — HOSPITAL ENCOUNTER (OUTPATIENT)
Dept: CARDIOLOGY | Facility: CLINIC | Age: 82
Discharge: HOME | End: 2022-12-14
Attending: INTERNAL MEDICINE
Payer: COMMERCIAL

## 2022-12-14 DIAGNOSIS — R00.0 WIDE-COMPLEX TACHYCARDIA: ICD-10-CM

## 2022-12-14 PROCEDURE — 999N000096 CARDIAC MOBILE TELEMETRY MONITOR

## 2022-12-26 ENCOUNTER — HEALTH MAINTENANCE LETTER (OUTPATIENT)
Age: 82
End: 2022-12-26

## 2023-01-01 ENCOUNTER — APPOINTMENT (OUTPATIENT)
Dept: CT IMAGING | Facility: CLINIC | Age: 83
End: 2023-01-01
Attending: STUDENT IN AN ORGANIZED HEALTH CARE EDUCATION/TRAINING PROGRAM
Payer: COMMERCIAL

## 2023-01-01 ENCOUNTER — APPOINTMENT (OUTPATIENT)
Dept: RADIOLOGY | Facility: CLINIC | Age: 83
End: 2023-01-01
Attending: STUDENT IN AN ORGANIZED HEALTH CARE EDUCATION/TRAINING PROGRAM
Payer: COMMERCIAL

## 2023-01-01 ENCOUNTER — HOSPITAL ENCOUNTER (OUTPATIENT)
Facility: CLINIC | Age: 83
Setting detail: OBSERVATION
Discharge: HOME | End: 2023-01-03
Attending: STUDENT IN AN ORGANIZED HEALTH CARE EDUCATION/TRAINING PROGRAM | Admitting: STUDENT IN AN ORGANIZED HEALTH CARE EDUCATION/TRAINING PROGRAM
Payer: COMMERCIAL

## 2023-01-01 DIAGNOSIS — I25.10 ATHEROSCLEROSIS OF NATIVE CORONARY ARTERY OF NATIVE HEART WITHOUT ANGINA PECTORIS: Primary | ICD-10-CM

## 2023-01-01 DIAGNOSIS — I10 ESSENTIAL HYPERTENSION: ICD-10-CM

## 2023-01-01 DIAGNOSIS — U07.1 INFECTION DUE TO 2019 NOVEL CORONAVIRUS: ICD-10-CM

## 2023-01-01 DIAGNOSIS — R11.10 VOMITING AND DIARRHEA: ICD-10-CM

## 2023-01-01 DIAGNOSIS — R19.7 VOMITING AND DIARRHEA: ICD-10-CM

## 2023-01-01 DIAGNOSIS — I10 BENIGN ESSENTIAL HYPERTENSION: ICD-10-CM

## 2023-01-01 PROBLEM — N39.0 CHRONIC URINARY TRACT INFECTION: Status: ACTIVE | Noted: 2021-11-23

## 2023-01-01 PROBLEM — Z47.89 SURGICAL AFTERCARE, MUSCULOSKELETAL SYSTEM: Status: ACTIVE | Noted: 2019-03-06

## 2023-01-01 PROBLEM — G60.8 PERIPHERAL SENSORY NEUROPATHY: Status: ACTIVE | Noted: 2019-01-09

## 2023-01-01 PROBLEM — G62.9 NEUROPATHY: Status: ACTIVE | Noted: 2021-11-23

## 2023-01-01 PROBLEM — L71.9 ROSACEA: Status: ACTIVE | Noted: 2019-01-09

## 2023-01-01 PROBLEM — Z96.651 HISTORY OF TOTAL KNEE ARTHROPLASTY, RIGHT: Status: ACTIVE | Noted: 2019-06-06

## 2023-01-01 PROBLEM — F32.1 MODERATE MAJOR DEPRESSION (H): Status: ACTIVE | Noted: 2019-05-23

## 2023-01-01 PROBLEM — I47.10 PAROXYSMAL SVT (SUPRAVENTRICULAR TACHYCARDIA) (H): Status: ACTIVE | Noted: 2022-11-29

## 2023-01-01 PROBLEM — M65.321 TRIGGER INDEX FINGER OF RIGHT HAND: Status: ACTIVE | Noted: 2019-03-06

## 2023-01-01 PROBLEM — L28.0 LICHENOID DERMATITIS: Status: ACTIVE | Noted: 2022-05-27

## 2023-01-01 PROBLEM — R30.0 PAINFUL URGING TO URINATE: Status: ACTIVE | Noted: 2023-01-01

## 2023-01-01 PROBLEM — Z98.1 S/P LUMBAR SPINAL FUSION: Status: ACTIVE | Noted: 2022-06-18

## 2023-01-01 PROBLEM — L29.2 PRURITUS OF VULVA: Status: ACTIVE | Noted: 2023-01-01

## 2023-01-01 LAB
ALBUMIN SERPL-MCNC: 3.3 G/DL (ref 3.5–5)
ALP SERPL-CCNC: 104 U/L (ref 45–120)
ALT SERPL W P-5'-P-CCNC: 15 U/L (ref 0–45)
ANION GAP SERPL CALCULATED.3IONS-SCNC: 8 MMOL/L (ref 5–18)
AST SERPL W P-5'-P-CCNC: 26 U/L (ref 0–40)
BASOPHILS # BLD AUTO: 0 10E3/UL (ref 0–0.2)
BASOPHILS NFR BLD AUTO: 0 %
BILIRUB DIRECT SERPL-MCNC: 0.1 MG/DL
BILIRUB SERPL-MCNC: 0.3 MG/DL (ref 0–1)
BUN SERPL-MCNC: 13 MG/DL (ref 8–28)
CALCIUM SERPL-MCNC: 8.5 MG/DL (ref 8.5–10.5)
CHLORIDE BLD-SCNC: 98 MMOL/L (ref 98–107)
CO2 SERPL-SCNC: 22 MMOL/L (ref 22–31)
CREAT SERPL-MCNC: 0.68 MG/DL (ref 0.6–1.1)
EOSINOPHIL # BLD AUTO: 0 10E3/UL (ref 0–0.7)
EOSINOPHIL NFR BLD AUTO: 0 %
ERYTHROCYTE [DISTWIDTH] IN BLOOD BY AUTOMATED COUNT: 15.7 % (ref 10–15)
FLUAV RNA SPEC QL NAA+PROBE: NEGATIVE
FLUBV RNA RESP QL NAA+PROBE: NEGATIVE
GFR SERPL CREATININE-BSD FRML MDRD: 85 ML/MIN/1.73M2
GLUCOSE BLD-MCNC: 102 MG/DL (ref 70–125)
HCT VFR BLD AUTO: 37 % (ref 35–47)
HGB BLD-MCNC: 12.4 G/DL (ref 11.7–15.7)
IMM GRANULOCYTES # BLD: 0 10E3/UL
IMM GRANULOCYTES NFR BLD: 0 %
LIPASE SERPL-CCNC: 18 U/L (ref 0–52)
LYMPHOCYTES # BLD AUTO: 1.1 10E3/UL (ref 0.8–5.3)
LYMPHOCYTES NFR BLD AUTO: 20 %
MCH RBC QN AUTO: 30 PG (ref 26.5–33)
MCHC RBC AUTO-ENTMCNC: 33.5 G/DL (ref 31.5–36.5)
MCV RBC AUTO: 90 FL (ref 78–100)
MONOCYTES # BLD AUTO: 0.7 10E3/UL (ref 0–1.3)
MONOCYTES NFR BLD AUTO: 12 %
NEUTROPHILS # BLD AUTO: 3.7 10E3/UL (ref 1.6–8.3)
NEUTROPHILS NFR BLD AUTO: 68 %
NRBC # BLD AUTO: 0 10E3/UL
NRBC BLD AUTO-RTO: 0 /100
PLATELET # BLD AUTO: 234 10E3/UL (ref 150–450)
POTASSIUM BLD-SCNC: 3.4 MMOL/L (ref 3.5–5)
PROT SERPL-MCNC: 6.4 G/DL (ref 6–8)
RBC # BLD AUTO: 4.13 10E6/UL (ref 3.8–5.2)
RSV RNA SPEC NAA+PROBE: NEGATIVE
SARS-COV-2 RNA RESP QL NAA+PROBE: POSITIVE
SODIUM SERPL-SCNC: 128 MMOL/L (ref 136–145)
SODIUM SERPL-SCNC: 131 MMOL/L (ref 136–145)
SODIUM UR-SCNC: 86 MMOL/L
TROPONIN I SERPL-MCNC: <0.01 NG/ML (ref 0–0.29)
WBC # BLD AUTO: 5.4 10E3/UL (ref 4–11)

## 2023-01-01 PROCEDURE — G0378 HOSPITAL OBSERVATION PER HR: HCPCS | Mod: CS

## 2023-01-01 PROCEDURE — 99285 EMERGENCY DEPT VISIT HI MDM: CPT | Mod: CS,25

## 2023-01-01 PROCEDURE — 36415 COLL VENOUS BLD VENIPUNCTURE: CPT | Performed by: STUDENT IN AN ORGANIZED HEALTH CARE EDUCATION/TRAINING PROGRAM

## 2023-01-01 PROCEDURE — 84300 ASSAY OF URINE SODIUM: CPT | Performed by: EMERGENCY MEDICINE

## 2023-01-01 PROCEDURE — 250N000011 HC RX IP 250 OP 636: Performed by: STUDENT IN AN ORGANIZED HEALTH CARE EDUCATION/TRAINING PROGRAM

## 2023-01-01 PROCEDURE — 93005 ELECTROCARDIOGRAM TRACING: CPT | Performed by: EMERGENCY MEDICINE

## 2023-01-01 PROCEDURE — 36415 COLL VENOUS BLD VENIPUNCTURE: CPT | Performed by: EMERGENCY MEDICINE

## 2023-01-01 PROCEDURE — 80053 COMPREHEN METABOLIC PANEL: CPT | Performed by: STUDENT IN AN ORGANIZED HEALTH CARE EDUCATION/TRAINING PROGRAM

## 2023-01-01 PROCEDURE — 250N000013 HC RX MED GY IP 250 OP 250 PS 637: Performed by: EMERGENCY MEDICINE

## 2023-01-01 PROCEDURE — 99222 1ST HOSP IP/OBS MODERATE 55: CPT | Performed by: EMERGENCY MEDICINE

## 2023-01-01 PROCEDURE — 96361 HYDRATE IV INFUSION ADD-ON: CPT

## 2023-01-01 PROCEDURE — 84295 ASSAY OF SERUM SODIUM: CPT | Mod: 91 | Performed by: EMERGENCY MEDICINE

## 2023-01-01 PROCEDURE — G0378 HOSPITAL OBSERVATION PER HR: HCPCS

## 2023-01-01 PROCEDURE — 250N000013 HC RX MED GY IP 250 OP 250 PS 637: Performed by: STUDENT IN AN ORGANIZED HEALTH CARE EDUCATION/TRAINING PROGRAM

## 2023-01-01 PROCEDURE — 84484 ASSAY OF TROPONIN QUANT: CPT | Performed by: STUDENT IN AN ORGANIZED HEALTH CARE EDUCATION/TRAINING PROGRAM

## 2023-01-01 PROCEDURE — 83690 ASSAY OF LIPASE: CPT | Performed by: STUDENT IN AN ORGANIZED HEALTH CARE EDUCATION/TRAINING PROGRAM

## 2023-01-01 PROCEDURE — 85025 COMPLETE CBC W/AUTO DIFF WBC: CPT | Performed by: STUDENT IN AN ORGANIZED HEALTH CARE EDUCATION/TRAINING PROGRAM

## 2023-01-01 PROCEDURE — 71275 CT ANGIOGRAPHY CHEST: CPT

## 2023-01-01 PROCEDURE — 258N000003 HC RX IP 258 OP 636: Performed by: STUDENT IN AN ORGANIZED HEALTH CARE EDUCATION/TRAINING PROGRAM

## 2023-01-01 PROCEDURE — 258N000003 HC RX IP 258 OP 636: Performed by: EMERGENCY MEDICINE

## 2023-01-01 PROCEDURE — C9803 HOPD COVID-19 SPEC COLLECT: HCPCS

## 2023-01-01 PROCEDURE — 96374 THER/PROPH/DIAG INJ IV PUSH: CPT | Mod: 59

## 2023-01-01 PROCEDURE — 82248 BILIRUBIN DIRECT: CPT | Performed by: STUDENT IN AN ORGANIZED HEALTH CARE EDUCATION/TRAINING PROGRAM

## 2023-01-01 PROCEDURE — 250N000011 HC RX IP 250 OP 636: Performed by: EMERGENCY MEDICINE

## 2023-01-01 PROCEDURE — 71045 X-RAY EXAM CHEST 1 VIEW: CPT

## 2023-01-01 PROCEDURE — 87637 SARSCOV2&INF A&B&RSV AMP PRB: CPT | Performed by: STUDENT IN AN ORGANIZED HEALTH CARE EDUCATION/TRAINING PROGRAM

## 2023-01-01 RX ORDER — BENZONATATE 100 MG/1
200 CAPSULE ORAL 3 TIMES DAILY PRN
Status: DISCONTINUED | OUTPATIENT
Start: 2023-01-01 | End: 2023-01-03 | Stop reason: HOSPADM

## 2023-01-01 RX ORDER — ACETAMINOPHEN 500 MG
500-1000 TABLET ORAL EVERY 6 HOURS PRN
COMMUNITY

## 2023-01-01 RX ORDER — SODIUM CHLORIDE 9 MG/ML
INJECTION, SOLUTION INTRAVENOUS CONTINUOUS
Status: DISCONTINUED | OUTPATIENT
Start: 2023-01-01 | End: 2023-01-03 | Stop reason: HOSPADM

## 2023-01-01 RX ORDER — PANTOPRAZOLE SODIUM 20 MG/1
40 TABLET, DELAYED RELEASE ORAL 2 TIMES DAILY
Status: DISCONTINUED | OUTPATIENT
Start: 2023-01-01 | End: 2023-01-03 | Stop reason: HOSPADM

## 2023-01-01 RX ORDER — POTASSIUM CHLORIDE 1500 MG/1
40 TABLET, EXTENDED RELEASE ORAL ONCE
Status: COMPLETED | OUTPATIENT
Start: 2023-01-01 | End: 2023-01-01

## 2023-01-01 RX ORDER — ONDANSETRON 2 MG/ML
4 INJECTION INTRAMUSCULAR; INTRAVENOUS ONCE
Status: COMPLETED | OUTPATIENT
Start: 2023-01-01 | End: 2023-01-01

## 2023-01-01 RX ORDER — PRAVASTATIN SODIUM 10 MG
10 TABLET ORAL AT BEDTIME
Status: DISCONTINUED | OUTPATIENT
Start: 2023-01-01 | End: 2023-01-02

## 2023-01-01 RX ORDER — ACETAMINOPHEN 325 MG/1
650 TABLET ORAL EVERY 4 HOURS PRN
Status: DISCONTINUED | OUTPATIENT
Start: 2023-01-01 | End: 2023-01-03 | Stop reason: HOSPADM

## 2023-01-01 RX ORDER — GUAIFENESIN 600 MG/1
1200 TABLET, EXTENDED RELEASE ORAL 2 TIMES DAILY PRN
COMMUNITY
End: 2023-03-13

## 2023-01-01 RX ORDER — ONDANSETRON 4 MG/1
4 TABLET, ORALLY DISINTEGRATING ORAL EVERY 6 HOURS PRN
Status: DISCONTINUED | OUTPATIENT
Start: 2023-01-01 | End: 2023-01-03 | Stop reason: HOSPADM

## 2023-01-01 RX ORDER — LORAZEPAM 0.5 MG/1
0.5 TABLET ORAL AT BEDTIME
Status: DISCONTINUED | OUTPATIENT
Start: 2023-01-01 | End: 2023-01-03 | Stop reason: HOSPADM

## 2023-01-01 RX ORDER — ONDANSETRON 2 MG/ML
4 INJECTION INTRAMUSCULAR; INTRAVENOUS EVERY 6 HOURS PRN
Status: DISCONTINUED | OUTPATIENT
Start: 2023-01-01 | End: 2023-01-03 | Stop reason: HOSPADM

## 2023-01-01 RX ORDER — FLUTICASONE PROPIONATE 50 MCG
1 SPRAY, SUSPENSION (ML) NASAL DAILY
Status: DISCONTINUED | OUTPATIENT
Start: 2023-01-02 | End: 2023-01-03 | Stop reason: HOSPADM

## 2023-01-01 RX ORDER — IOPAMIDOL 755 MG/ML
90 INJECTION, SOLUTION INTRAVASCULAR ONCE
Status: COMPLETED | OUTPATIENT
Start: 2023-01-01 | End: 2023-01-01

## 2023-01-01 RX ADMIN — PANTOPRAZOLE SODIUM 40 MG: 20 TABLET, DELAYED RELEASE ORAL at 20:41

## 2023-01-01 RX ADMIN — DEXAMETHASONE 6 MG: 2 TABLET ORAL at 20:45

## 2023-01-01 RX ADMIN — IOPAMIDOL 90 ML: 755 INJECTION, SOLUTION INTRAVENOUS at 18:55

## 2023-01-01 RX ADMIN — SODIUM CHLORIDE: 9 INJECTION, SOLUTION INTRAVENOUS at 20:40

## 2023-01-01 RX ADMIN — LORAZEPAM 0.5 MG: 0.5 TABLET ORAL at 20:41

## 2023-01-01 RX ADMIN — ONDANSETRON 4 MG: 2 INJECTION INTRAMUSCULAR; INTRAVENOUS at 15:27

## 2023-01-01 RX ADMIN — PRAVASTATIN SODIUM 10 MG: 10 TABLET ORAL at 22:52

## 2023-01-01 RX ADMIN — POTASSIUM CHLORIDE 40 MEQ: 1500 TABLET, EXTENDED RELEASE ORAL at 16:55

## 2023-01-01 RX ADMIN — ACETAMINOPHEN 650 MG: 325 TABLET ORAL at 20:41

## 2023-01-01 RX ADMIN — SODIUM CHLORIDE 1000 ML: 9 INJECTION, SOLUTION INTRAVENOUS at 15:09

## 2023-01-01 RX ADMIN — BENZONATATE 200 MG: 100 CAPSULE ORAL at 20:41

## 2023-01-01 ASSESSMENT — ACTIVITIES OF DAILY LIVING (ADL)
ADLS_ACUITY_SCORE: 26
ADLS_ACUITY_SCORE: 35
DEPENDENT_IADLS:: CLEANING;SHOPPING;TRANSPORTATION
ADLS_ACUITY_SCORE: 26
ADLS_ACUITY_SCORE: 35
ADLS_ACUITY_SCORE: 39

## 2023-01-01 ASSESSMENT — ENCOUNTER SYMPTOMS
COUGH: 1
FEVER: 0
HEMATURIA: 0
VOMITING: 1
ABDOMINAL PAIN: 0
DIARRHEA: 1
DYSURIA: 0
NAUSEA: 1

## 2023-01-01 NOTE — ED PROVIDER NOTES
NAME: Anabelle Cameron  AGE: 82 year old female  YOB: 1940  MRN: [id number]  EVALUATION DATE & TIME: 2023  2:31 PM    PCP: Jenise Christensen  ED PROVIDER: Danielle Gayle MD.    Chief Complaint   Patient presents with     Nausea, Vomiting, & Diarrhea       FINAL IMPRESSION:  1. Vomiting and diarrhea    2. Infection due to 2019 novel coronavirus        MEDICAL DECISION MAKIN:53 PM I met with the patient, obtained history, performed an initial exam, and discussed options and plan for diagnostics and treatment here in the ED.     81 y/o F who presents with nausea/vomiting/diarrhea. Has been sick for last week with symptoms. Covid returned positive here and suspect this is cause of symptoms. EKG is sinus without ST elevation, troponin WNL, no chest pain, doubt ACS. With no chest pain, tachycardia, hypoxia, low suspicion for PE. Her abdominal exam is reassuring, do not think she needs CT of her abdomen/pelvis at this time. Labs notable for mild hypokalemia and hyponatremia, likely from GI losses/decreased PO intake. She is still feeling lightheaded with ambulation. Discussed options and we decided on observation admission. She was signed out to Dr. Causey pending admission and chest xray.      Medical Decision Making    History:    Supplemental history from: N/A    External Record(s) reviewed: Documented in HPI, if applicable.    Work Up:    Chart documentation includes differential considered and any EKGs or imaging interpreted by provider.    In additional to work up documented, I considered the following work up: See chart documentation, if applicable.    External consultation:    Discussion of management with another provider: See chart documentation, if applicable    Complicating factors:    Care impacted by chronic illness: N/A    Care affected by social determinants of health: N/A    Disposition considerations: Admit.      MEDICATIONS GIVEN IN THE EMERGENCY:  Medications   0.9% sodium chloride BOLUS  (0 mLs Intravenous Stopped 1/1/23 1629)   ondansetron (ZOFRAN) injection 4 mg (4 mg Intravenous Given 1/1/23 1527)   potassium chloride ER (KLOR-CON M) CR tablet 40 mEq (40 mEq Oral Given 1/1/23 1655)       NEW PRESCRIPTIONS STARTED AT TODAY'S ER VISIT:  New Prescriptions    No medications on file        =================================================================  HPI    Patient information was obtained from: Patient   Use of : N/A       Anabelle Cameron is a 82 year old female with a past medical history of hyperlipidemia, DDD, and SVT who presents for nausea, vomiting, and diarrhea.      Patient endorses vomiting, diarrhea, nausea, and a cough since after [place]. Patient notes she recently saw cardiology for SVT, and stopped using her cardiac monitor early, because it was uncomfortable. Patient denies fever, chest pain, abdominal pain, dysuria, hematuria, blood in vomit, or any other complaints at this time. Family member recently had covid19.    REVIEW OF SYSTEMS   Review of Systems   Constitutional: Negative for fever.   Respiratory: Positive for cough.    Cardiovascular: Negative for chest pain.   Gastrointestinal: Positive for diarrhea, nausea and vomiting. Negative for abdominal pain.   Genitourinary: Negative for dysuria and hematuria.   All other systems reviewed and are negative.       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 LEFT EYE Occurred at time of IOL/ERM       PAST SURGICAL HISTORY:  Past Surgical History:   Procedure Laterality Date      APPENDECTOMY OPEN       GYN SURGERY       ORTHOPEDIC SURGERY         CURRENT MEDICATIONS:    No current facility-administered medications for this encounter.    Current Outpatient Medications:      acetaminophen (TYLENOL) 500 MG tablet, Take 500-1,000 mg by mouth every 6 hours as needed for mild pain, Disp: , Rfl:      Calcium Carbonate-Vitamin D 600-5 MG-MCG CAPS, Take 1 capsule by mouth daily, Disp: , Rfl:      cetirizine (ZYRTEC) 10 MG tablet, Take 10 mg by mouth daily, Disp: , Rfl:      Cholecalciferol 10 MCG (400 UNIT) CAPS, Take 400 Units by mouth daily, Disp: , Rfl:      fluticasone (FLONASE) 50 MCG/ACT nasal spray, Spray 1 spray into both nostrils daily, Disp: , Rfl:      guaiFENesin (MUCINEX) 600 MG 12 hr tablet, Take 1,200 mg by mouth 2 times daily as needed for congestion, Disp: , Rfl:      LORazepam (ATIVAN) 0.5 MG tablet, TAKE ONE TABLET BY MOUTH AT BEDTIME AS NEEDED FOR ANXIETY, Disp: , Rfl:      multivitamin, therapeutic with minerals (THERA-VIT-M) TABS, Take 1 tablet by mouth daily.  , Disp: , Rfl:      omeprazole (PRILOSEC) 40 MG DR capsule, Take 40 mg by mouth daily, Disp: , Rfl:      polyethylene glycol (MIRALAX) 17 GM/Dose powder, Take 17 g by mouth daily as needed for constipation, Disp: 510 g, Rfl:      pravastatin (PRAVACHOL) 10 MG tablet, Take 10 mg by mouth At Bedtime, Disp: , Rfl:     ALLERGIES:  Allergies   Allergen Reactions     Levofloxacin Other (See Comments), Dizziness and Nausea     dizziness  Patient unable to tolerate side effects       Hydrocodone-Acetaminophen Other (See Comments)     Dizziness, fainting  Other reaction(s): Syncope  Vasovagal episode         Oxycodone-Acetaminophen Other (See Comments)     Dizziness, fainting       FAMILY HISTORY:  History reviewed. No pertinent family history.    SOCIAL HISTORY:   Social History     Socioeconomic History     Marital status:    Tobacco Use     Smoking status: Former     Types: Cigarettes     Quit date: 1973     Years since  "quittin.0     Smokeless tobacco: Never   Vaping Use     Vaping Use: Never used   Substance and Sexual Activity     Alcohol use: Never     Comment: social     Drug use: No       PHYSICAL EXAM:    Vitals: BP (!) 156/73   Pulse 86   Temp 98.2  F (36.8  C) (Temporal)   Resp 21   Ht 1.676 m (5' 6\")   SpO2 96%   BMI 25.02 kg/m     Constitutional: Well developed. Tired appearing elderly female resting in bed  HENT: Normocephalic, atraumatic, mucous membranes moist. Neck-gross ROM intact.   Eyes: Pupils mid-range, sclera white, no discharge  Respiratory: CTAB, no respiratory distress, no wheezing, speaks full sentences easily.  Cardiovascular: Normal heart rate, regular rhythm.  GI: Soft, not distended, not tender to palpation, no palpable masses  Musculoskeletal: Moving all 4 extremities intentionally and without pain. No obvious deformity.  Skin: Warm, dry, no rash.  Neurologic: Alert & oriented x 3, speech soft but clear, moving all extremities spontaneously      LAB:  All pertinent labs reviewed and interpreted.  Labs Ordered and Resulted from Time of ED Arrival to Time of ED Departure   BASIC METABOLIC PANEL - Abnormal       Result Value    Sodium 128 (*)     Potassium 3.4 (*)     Chloride 98      Carbon Dioxide (CO2) 22      Anion Gap 8      Urea Nitrogen 13      Creatinine 0.68      Calcium 8.5      Glucose 102      GFR Estimate 85     HEPATIC FUNCTION PANEL - Abnormal    Bilirubin Total 0.3      Bilirubin Direct 0.1      Protein Total 6.4      Albumin 3.3 (*)     Alkaline Phosphatase 104      AST 26      ALT 15     INFLUENZA A/B & SARS-COV2 PCR MULTIPLEX - Abnormal    Influenza A PCR Negative      Influenza B PCR Negative      RSV PCR Negative      SARS CoV2 PCR Positive (*)    CBC WITH PLATELETS AND DIFFERENTIAL - Abnormal    WBC Count 5.4      RBC Count 4.13      Hemoglobin 12.4      Hematocrit 37.0      MCV 90      MCH 30.0      MCHC 33.5      RDW 15.7 (*)     Platelet Count 234      % Neutrophils 68   "    % Lymphocytes 20      % Monocytes 12      % Eosinophils 0      % Basophils 0      % Immature Granulocytes 0      NRBCs per 100 WBC 0      Absolute Neutrophils 3.7      Absolute Lymphocytes 1.1      Absolute Monocytes 0.7      Absolute Eosinophils 0.0      Absolute Basophils 0.0      Absolute Immature Granulocytes 0.0      Absolute NRBCs 0.0     LIPASE - Normal    Lipase 18     TROPONIN I - Normal    Troponin I <0.01     SODIUM       RADIOLOGY:  Pending    EKG:   Performed at: 15:06  Impression: Normal sinus rhythm, possible left atrial enlargement, septal infarct, age undetermined, abnormal ECG  Rate: 80 BPM  Rhythm: Sinus  QRS Interval: 72 ms  QTc Interval: 449 ms  Comparison: Reviewed ECGs from 6/21/2022 and 11/23/2022  I have independently reviewed and interpreted the EKG(s) documented above.     PROCEDURES:   Procedures     I, Kisha Huber, am serving as a scribe to document services personally performed by Dr. Danielle Gayle based on my observation and the provider's statements to me. I, Danielle Gayle MD attest that Kisha Huber is acting in a scribe capacity, has observed my performance of the services and has documented them in accordance with my direction.     Danielle Gayle M.D.  Emergency Medicine  [facility] EMERGENCY ROOM  [address] [address] [phone]  Dept: [phone]     Danielle Gayle MD  01/01/23 9840

## 2023-01-01 NOTE — H&P
[facility] MEDICINE ADMISSION HISTORY AND PHYSICAL     Assessment & Plan       82 year old female into  ER after presenting for a syncopal event.  Pt has been feeling  Poorly for a few days with URI symptoms, vomiting and diarrhea.  Today while she  Was watching TV she had a witnessed syncopal event.  Per chart review the patient  Had syncopal events in November along with wide complex tachycardia (RBBB  Versus VT).  She had a 30 day monitor with results pending.      Pt denies chest pain, dyspnea.   Today she is unexpectedly covid positive.   XR chest with small left sided effusion that may be loculated.  CT chest pending.  She does not have an oxygen need.      Problem list:    1.  COVID: dexamethasone  2.  Hyponatremia: due to volume status. She had 1 liter NS on admission;   Likely an acute issue so normalizing in less than 24 hours will be safe  3.  Vomiting, diarrhea: zofran, ivf; due to covid;   4.  Syncope: recent history of syncope with wide complex; cardiology visit;   30 day monitor pending; CT chest on admission with moderate amount of  atherosclerotic plaque in the LAD.  I wonder about a lesion there to cause her  recurrent syncope.  After all today it occurred at rest.  Cardiology consult for further  input regarding risk stratification.  5. Hypokalemia: so mild; 1 oral dose; this will resolve  6.  dvt prevention:  lovenox      Chief Complaint  syncope     HISTORY     82-year-old female into [facility] ER for syncopal event.  Patient has similar episode in November.  She was worked up for a wide-complex tachycardia.  She was seen by a cardiologist and put on a 30-day event monitor.  MRI of her heart is pending.  Patient and patient's  state they have had cough and congestion for several days.  She vomited yesterday.  Today while watching TV the  saw her passed out.  She came to.  She was brought here.  Patient inadvertently tested positive for COVID.      X-ray  chest shows a possible left-sided pleural effusion.  CT chest and follow-up shows stranding at the bibasilar areas though no evidence of loculated effusion.  This is significant amount of atherosclerotic disease in her LAD.    EKG today shows no ST-T wave elevations.  She does have Q waves in her inferior and septal leads.  With the moderate amount of plaque in her LAD and evidence of Q waves and likely an old event she is due for further cardiac restratification.    Patient is being admitted for syncope, hyponatremia, COVID.  The sodium was only 128.  After 1 L normal saline is 131.  This will normalize with further IV fluid.    Reason for admission is discussed with patient.  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 LEFT EYE Occurred at time of IOL/ERM     Surgical History     Past Surgical History:   Procedure Laterality Date     APPENDECTOMY OPEN       GYN SURGERY       ORTHOPEDIC SURGERY       Family History    Reviewed, and History reviewed. No pertinent family history.     Social History      Social History     Tobacco Use     Smoking status: Former     Types: Cigarettes     Quit date:      Years since quittin.0     Smokeless tobacco: Never   Vaping Use     Vaping Use: Never used   Substance Use Topics     Alcohol use: Never     Comment: social     Drug use: No        Allergies     Allergies   Allergen Reactions     Levofloxacin Other (See Comments), Dizziness and Nausea     dizziness  Patient unable to tolerate side effects        Hydrocodone-Acetaminophen Other (See Comments)     Dizziness, fainting  Other reaction(s): Syncope  Vasovagal episode         Oxycodone-Acetaminophen Other (See Comments)     Dizziness, fainting     Prior to Admission Medications      Prior to Admission Medications   Prescriptions Last Dose Informant Patient Reported? Taking?   Calcium Carbonate-Vitamin D 600-5 MG-MCG CAPS Past Week  Yes Yes   Sig: Take 1 capsule by mouth daily   Cholecalciferol 10 MCG (400 UNIT) CAPS Past Week  Yes Yes   Sig: Take 400 Units by mouth daily   LORazepam (ATIVAN) 0.5 MG tablet Past Week at prn  Yes Yes   Sig: TAKE ONE TABLET BY MOUTH AT BEDTIME AS NEEDED FOR ANXIETY   acetaminophen (TYLENOL) 500 MG tablet Past Week  Yes Yes   Sig: Take 500-1,000 mg by mouth every 6 hours as needed for mild pain   cetirizine (ZYRTEC) 10 MG tablet Past Week  Yes Yes   Sig: Take 10 mg by mouth daily   fluticasone (FLONASE) 50 MCG/ACT nasal spray 1/1/2023 at AM  Yes Yes   Sig: Spray 1 spray into both nostrils daily   guaiFENesin (MUCINEX) 600 MG 12 hr tablet 1/1/2023 at AM  Yes Yes   Sig: Take 1,200 mg by mouth 2 times daily as needed for congestion   multivitamin, therapeutic with minerals (THERA-VIT-M) TABS Past Week  Yes Yes   Sig: Take 1 tablet by mouth daily.     omeprazole (PRILOSEC) 40 MG DR capsule Past Week  Yes Yes   Sig: Take 40 mg by mouth daily   polyethylene glycol (MIRALAX) 17 GM/Dose powder Past Week at prn  No Yes   Sig: Take 17 g by mouth daily as needed for constipation   pravastatin (PRAVACHOL) 10 MG tablet 12/30/2022 at HS  Yes Yes   Sig: Take 10 mg by mouth At Bedtime      Facility-Administered Medications: None      Review of Systems     A 12 point comprehensive review of systems was negative except as noted above in HPI.    PHYSICAL EXAMINATION       Vitals      Temp:  [98.2  F (36.8  C)] 98.2  F (36.8  C)  Pulse:  [79-86] 86  Resp:  [14-23] 21  BP: (153-163)/(67-73) 156/73  SpO2:  [93 %-96 %] 96 %    Examination     GENERAL:   Alert, appears comfortable, in no acute distress, appears stated age   HEAD:  Normocephalic, without obvious abnormality, atraumatic   EYES:  PERRL, conjunctiva/corneas clear, no scleral icterus, EOM's intact   NOSE: Nares normal, septum midline, mucosa normal, no drainage   THROAT: Lips, mucosa, and tongue normal; teeth and gums normal, mouth moist   NECK: Supple, symmetrical, trachea midline   BACK:   Symmetric, no curvature, ROM normal   LUNGS:   Clear to auscultation bilaterally, no rales, rhonchi, or wheezing, symmetric chest rise on inhalation, respirations unlabored   CHEST WALL:  No tenderness or deformity   HEART:  Regular rate and rhythm, S1 and S2 normal, no murmur, rub, or gallop    ABDOMEN:   Soft, non-tender, bowel sounds active all four quadrants, no masses, no organomegaly, no rebound or guarding   EXTREMITIES: Extremities normal, atraumatic, no cyanosis or edema    SKIN: Dry to touch, no exanthems in the visualized areas   NEURO: Alert, oriented x 4, moves all four extremities freely, non-focal   PSYCH: Cooperative, behavior is appropriate      Pertinent Lab     Most Recent 3 BMP's:Recent Labs   Lab Test 01/01/23  1506 11/23/22  1544 06/21/22 2001   * 135* 138   POTASSIUM 3.4* 3.4 3.9   CHLORIDE 98 99 103   CO2 22 22 27   BUN 13 17.4 12   CR 0.68 0.64 0.51*   ANIONGAP 8 14 8   KINJAL 8.5 9.8 9.5    108* 99         Pertinent Radiology     Radiology Results:   Recent Results (from the past 24 hour(s))   XR Chest Port 1 View    Narrative    EXAM: XR CHEST PORT 1 VIEW  LOCATION: [facility]  DATE/TIME: 1/1/2023 5:32 PM    INDICATION: cough  COMPARISON: 11/23/2022      Impression    IMPRESSION: Small layering left pleural effusion with associated atelectasis and/or consolidation. Right lung is clear. Heart size is normal. Right rotator cuff repair changes. Atherosclerosis.     EKG Results: pending, not done on initial ER work up    Advance Care Planning        Ashia  MD Lottie  [facility]  [facility]   Phone: #[phone]

## 2023-01-01 NOTE — ED TRIAGE NOTES
Patient arrived via EMS for nausea,vominting and diarrhea.  VSS,  in rig. No SOB. But reports a cough. Was around grandson on [place] who later tested positive for covid.     Triage Assessment     Row Name 01/01/23 9805       Triage Assessment (Adult)    Airway WDL WDL       Respiratory WDL    Respiratory WDL WDL       Skin Circulation/Temperature WDL    Skin Circulation/Temperature WDL WDL       Cardiac WDL    Cardiac WDL WDL       Peripheral/Neurovascular WDL    Peripheral Neurovascular WDL WDL       Cognitive/Neuro/Behavioral WDL    Cognitive/Neuro/Behavioral WDL WDL

## 2023-01-01 NOTE — ED NOTES
EMERGENCY DEPARTMENT SIGN OUT NOTE        ED COURSE AND MEDICAL DECISION MAKING  Patient was signed out to me by Dr Danielle Gayle at 5:07 PM     In brief, Anabelle Cameron is a 82 year old female who initially presented with vomiting, diarrhea, nausea, and a cough since after nu.     At time of sign out, disposition was pending.    - Patient admitted to the hospitalist service for fluids, antiemetics and further monitoring overnight.           FINAL IMPRESSION    Diarrhea, vomiting, dehydration, COVID      ED MEDS  Medications   0.9% sodium chloride BOLUS (0 mLs Intravenous Stopped 1/1/23 1629)   ondansetron (ZOFRAN) injection 4 mg (4 mg Intravenous Given 1/1/23 1527)   potassium chloride ER (KLOR-CON M) CR tablet 40 mEq (40 mEq Oral Given 1/1/23 1655)       LAB  Labs Ordered and Resulted from Time of ED Arrival to Time of ED Departure   BASIC METABOLIC PANEL - Abnormal       Result Value    Sodium 128 (*)     Potassium 3.4 (*)     Chloride 98      Carbon Dioxide (CO2) 22      Anion Gap 8      Urea Nitrogen 13      Creatinine 0.68      Calcium 8.5      Glucose 102      GFR Estimate 85     HEPATIC FUNCTION PANEL - Abnormal    Bilirubin Total 0.3      Bilirubin Direct 0.1      Protein Total 6.4      Albumin 3.3 (*)     Alkaline Phosphatase 104      AST 26      ALT 15     INFLUENZA A/B & SARS-COV2 PCR MULTIPLEX - Abnormal    Influenza A PCR Negative      Influenza B PCR Negative      RSV PCR Negative      SARS CoV2 PCR Positive (*)    CBC WITH PLATELETS AND DIFFERENTIAL - Abnormal    WBC Count 5.4      RBC Count 4.13      Hemoglobin 12.4      Hematocrit 37.0      MCV 90      MCH 30.0      MCHC 33.5      RDW 15.7 (*)     Platelet Count 234      % Neutrophils 68      % Lymphocytes 20      % Monocytes 12      % Eosinophils 0      % Basophils 0      % Immature Granulocytes 0      NRBCs per 100 WBC 0      Absolute Neutrophils 3.7      Absolute Lymphocytes 1.1      Absolute Monocytes 0.7      Absolute Eosinophils 0.0       Absolute Basophils 0.0      Absolute Immature Granulocytes 0.0      Absolute NRBCs 0.0     LIPASE - Normal    Lipase 18     TROPONIN I - Normal    Troponin I <0.01         RADIOLOGY    XR Chest Port 1 View    (Results Pending)       DISCHARGE MEDS  New Prescriptions    No medications on file         I, Mai Ozuna, am serving as a scribe to document services personally performed by Nahum Causey DO, based on my observations and the provider's statements to me.  I, Nahum Causey DO, attest that Mai Ozuna is acting in a scribe capacity, has observed my performance of the services and has documented them in accordance with my direction.     Nahum Causey DO  [facility] EMERGENCY ROOM  [facility] [facility] [id number] [phone]     Nahum Causey DO  01/01/23 9529

## 2023-01-01 NOTE — PHARMACY-ADMISSION MEDICATION HISTORY
Pharmacy Note - Admission Medication History    Pertinent Provider Information: patient has not been taking most of her medications for the past few weeks.   -Lisinopril: patient not recognized this medication; Per [employer], it was dispensed in Feb. 2022. Unclear why it was stopped.    ______________________________________________________________________    Prior To Admission (PTA) med list completed and updated in EMR.       PTA Med List   Medication Sig Last Dose     acetaminophen (TYLENOL) 500 MG tablet Take 500-1,000 mg by mouth every 6 hours as needed for mild pain Past Week     Calcium Carbonate-Vitamin D 600-5 MG-MCG CAPS Take 1 capsule by mouth daily Past Week     cetirizine (ZYRTEC) 10 MG tablet Take 10 mg by mouth daily Past Week     Cholecalciferol 10 MCG (400 UNIT) CAPS Take 400 Units by mouth daily Past Week     fluticasone (FLONASE) 50 MCG/ACT nasal spray Spray 1 spray into both nostrils daily 1/1/2023 at AM     guaiFENesin (MUCINEX) 600 MG 12 hr tablet Take 1,200 mg by mouth 2 times daily as needed for congestion 1/1/2023 at AM     LORazepam (ATIVAN) 0.5 MG tablet TAKE ONE TABLET BY MOUTH AT BEDTIME AS NEEDED FOR ANXIETY Past Week at prn     multivitamin, therapeutic with minerals (THERA-VIT-M) TABS Take 1 tablet by mouth daily.   Past Week     omeprazole (PRILOSEC) 40 MG DR capsule Take 40 mg by mouth daily Past Week     polyethylene glycol (MIRALAX) 17 GM/Dose powder Take 17 g by mouth daily as needed for constipation Past Week at prn     pravastatin (PRAVACHOL) 10 MG tablet Take 10 mg by mouth At Bedtime 12/30/2022 at        Information source(s): [facility]  Method of interview communication: in-person    Summary of Changes to PTA Med List  New: Tylenol, Mucinex  Discontinued: Valtrex, duplicated omeprazole, ibuprofen, hemp oil  Changed: none    Patient was asked about OTC/herbal products specifically.  PTA med list reflects this.    In the past week, patient  estimated taking medication this percent of the time:  less than 50% due to illness.    Allergies were reviewed, assessed, and updated with the patient.      Patient does not anticipate needing any multi-use medications during admission.    The information provided in this note is only as accurate as the sources available at the time of the update(s).    Thank you for the opportunity to participate in the care of this patient.    Shashi Quarles RPH  1/1/2023 5:31 PM

## 2023-01-02 ENCOUNTER — APPOINTMENT (OUTPATIENT)
Dept: RADIOLOGY | Facility: CLINIC | Age: 83
End: 2023-01-02
Attending: EMERGENCY MEDICINE
Payer: COMMERCIAL

## 2023-01-02 LAB
ANION GAP SERPL CALCULATED.3IONS-SCNC: 10 MMOL/L (ref 5–18)
BUN SERPL-MCNC: 10 MG/DL (ref 8–28)
CALCIUM SERPL-MCNC: 8.7 MG/DL (ref 8.5–10.5)
CHLORIDE BLD-SCNC: 102 MMOL/L (ref 98–107)
CO2 SERPL-SCNC: 21 MMOL/L (ref 22–31)
CREAT SERPL-MCNC: 0.58 MG/DL (ref 0.6–1.1)
GFR SERPL CREATININE-BSD FRML MDRD: 89 ML/MIN/1.73M2
GLUCOSE BLD-MCNC: 123 MG/DL (ref 70–125)
POTASSIUM BLD-SCNC: 4.1 MMOL/L (ref 3.5–5)
SODIUM SERPL-SCNC: 133 MMOL/L (ref 136–145)

## 2023-01-02 PROCEDURE — G0378 HOSPITAL OBSERVATION PER HR: HCPCS

## 2023-01-02 PROCEDURE — 36415 COLL VENOUS BLD VENIPUNCTURE: CPT | Performed by: EMERGENCY MEDICINE

## 2023-01-02 PROCEDURE — 250N000011 HC RX IP 250 OP 636: Performed by: EMERGENCY MEDICINE

## 2023-01-02 PROCEDURE — 96361 HYDRATE IV INFUSION ADD-ON: CPT

## 2023-01-02 PROCEDURE — 250N000013 HC RX MED GY IP 250 OP 250 PS 637: Performed by: EMERGENCY MEDICINE

## 2023-01-02 PROCEDURE — 258N000003 HC RX IP 258 OP 636: Performed by: EMERGENCY MEDICINE

## 2023-01-02 PROCEDURE — 74018 RADEX ABDOMEN 1 VIEW: CPT

## 2023-01-02 PROCEDURE — 80048 BASIC METABOLIC PNL TOTAL CA: CPT | Performed by: EMERGENCY MEDICINE

## 2023-01-02 PROCEDURE — 250N000013 HC RX MED GY IP 250 OP 250 PS 637: Performed by: INTERNAL MEDICINE

## 2023-01-02 PROCEDURE — 99231 SBSQ HOSP IP/OBS SF/LOW 25: CPT | Performed by: EMERGENCY MEDICINE

## 2023-01-02 PROCEDURE — 99222 1ST HOSP IP/OBS MODERATE 55: CPT | Performed by: INTERNAL MEDICINE

## 2023-01-02 RX ORDER — ATORVASTATIN CALCIUM 40 MG/1
40 TABLET, FILM COATED ORAL EVERY EVENING
Status: DISCONTINUED | OUTPATIENT
Start: 2023-01-02 | End: 2023-01-03 | Stop reason: HOSPADM

## 2023-01-02 RX ORDER — ATORVASTATIN CALCIUM 40 MG/1
40 TABLET, FILM COATED ORAL EVERY EVENING
Qty: 30 TABLET | Refills: 0 | Status: SHIPPED | OUTPATIENT
Start: 2023-01-02 | End: 2023-01-18

## 2023-01-02 RX ADMIN — SODIUM CHLORIDE: 9 INJECTION, SOLUTION INTRAVENOUS at 07:03

## 2023-01-02 RX ADMIN — ACETAMINOPHEN 650 MG: 325 TABLET ORAL at 21:35

## 2023-01-02 RX ADMIN — PANTOPRAZOLE SODIUM 40 MG: 20 TABLET, DELAYED RELEASE ORAL at 20:26

## 2023-01-02 RX ADMIN — SODIUM CHLORIDE: 9 INJECTION, SOLUTION INTRAVENOUS at 18:29

## 2023-01-02 RX ADMIN — DEXAMETHASONE 6 MG: 2 TABLET ORAL at 08:28

## 2023-01-02 RX ADMIN — PANTOPRAZOLE SODIUM 40 MG: 20 TABLET, DELAYED RELEASE ORAL at 08:27

## 2023-01-02 RX ADMIN — FLUTICASONE PROPIONATE 1 SPRAY: 50 SPRAY, METERED NASAL at 08:29

## 2023-01-02 RX ADMIN — ACETAMINOPHEN 650 MG: 325 TABLET ORAL at 04:17

## 2023-01-02 RX ADMIN — ATORVASTATIN CALCIUM 40 MG: 40 TABLET, FILM COATED ORAL at 20:26

## 2023-01-02 RX ADMIN — LORAZEPAM 0.5 MG: 0.5 TABLET ORAL at 21:35

## 2023-01-02 ASSESSMENT — ACTIVITIES OF DAILY LIVING (ADL)
ADLS_ACUITY_SCORE: 28
ADLS_ACUITY_SCORE: 26
ADLS_ACUITY_SCORE: 28

## 2023-01-02 NOTE — PLAN OF CARE
"PRIMARY DIAGNOSIS: \"GENERIC\" NURSING  OUTPATIENT/OBSERVATION GOALS TO BE MET BEFORE DISCHARGE:  ADLs back to baseline: Yes    Activity and level of assistance: Up with standby assistance.    Pain status: Pain free.    Return to near baseline physical activity: Yes     Discharge Planner Nurse   Safe discharge environment identified: Yes  Barriers to discharge: No       Entered by: Jennifer Noonan RN 01/02/2023 12:03 AM     Please review provider order for any additional goals.   Nurse to notify provider when observation goals have been met and patient is ready for discharge.Goal Outcome Evaluation:  Patient with slight cough (tesselon pearls admin), clear lungs, fluids infusing, SBA. A/Ox4, covid precautions maintained, purewic in place, slight aches/minor pain (tylenol admin with relief of sx).  Jennifer Noonan RN                          "

## 2023-01-02 NOTE — PLAN OF CARE
PRIMARY DIAGNOSIS: GENERALIZED WEAKNESS    OUTPATIENT/OBSERVATION GOALS TO BE MET BEFORE DISCHARGE  1. Orthostatic performed: No    2. Tolerating PO medications: Yes    3. Return to near baseline physical activity: Yes    4. Cleared for discharge by consultants (if involved): No    Discharge Planner Nurse   Safe discharge environment identified: Yes  Barriers to discharge: No       Entered by: Jennifer Noonan RN 01/02/2023 4:05 AM     Please review provider order for any additional goals.   Nurse to notify provider when observation goals have been met and patient is ready for discharge.Goal Outcome Evaluation:  Patient comfortable- no pain reported, cough subsided, needing 1L/NC due to slightly low 02 at deep rest (88%).  Fluids infusing, purewic in place with adequate output noted.   Jennifer Noonan RN

## 2023-01-02 NOTE — CONSULTS
[facility] HEART [facility]   [address], [place] [address]   [email or web address]   OFFICE: [phone]        Impression and Plan     1. Syncope.  Etiology not entirely clear, but must consider possible recurrent tachyarrhythmia.  She also, however, has been having some upper respiratory symptoms and found to be COVID-positive on presentation.  She has had some vomiting & diarrhea as well.  It is possible that perhaps dehydration may have been an issue    As noted below, Anabelle was diagnosed the third week of November with RBBB morphology tachyarrhythmia.  She was seen in formal consultation in the the Electrophysiology Department by Dr. Ada Hanley on 1 December 2022.  Nature of the rhythm disturbance not entirely clear if SVT with aberrancy (typical RBBB morphology) vs fascicular VT. A 30-day ambulatory monitor as well as cardiac MRI was recommended at that time.    Unfortunately, Anabelle did not tolerate the one-patch monitor due to irritation of the integument and only wore it for only 1  weeks.  She had already sent the device back in and was not wearing it at the time of her event.  Plan:    Will arrange for Anabelle to go home with our alternative monitor (Cardiac Mobile Telemetry Monitor) which is a 3-lead necklace type system with smaller patches.  Hopefully she will tolerate this better and emphasized the importance of trying to be compliant with wearing it.      I have already communicated with my technicians and we should be able to place this and advise her on how to use it before she is dismissed from the hospital    Regadenoson stress cardiac MRI which has already been scheduled as outpatient at [facility].    Monitor/normalize electrolytes as needed.    3.  Coronary artery disease.  Anabelle has evidence of coronary artery disease by virtue of CT scan this admission revealing mild-moderate plaque involving the proximal LAD.  Troponin on presentation within  normal limits suggesting against any recent myocardial injury.  ECG without any concerning ST changes and unchanged from 21 June 2022.    Regadenoson MRI as per problem #1.    4.  COVID-19 infection/emesis/diarrhea.  Management plan as outlined by Hospitalist Service.    5.  Dyslipidemia.  Lipid profile 10 March 2022 revealed  mg/dL and HDL 71 mg/dL.  Given evidence of coronary plaque noted on CT this admission, would advocate trying to suppress LDL less than 70 mg/dL if possible.  Recommend:    Convert pravastatin to atorvastatin 40 mg daily.    Discussed briefly with Dr. Ashia Tafoya prior to seeing patient in person.    Primary Cardiologist: Anabelle has been seen in the Electrophysiology Department by Dr. Ada Hanley (recent visit 1 December 2022).    History of Present Illness    Anabelle Cameron is a 82 year old female after having syncopal event.    Anabelle was diagnosed the third week of November with RBBB morphology tachyarrhythmia.  She was seen in formal consultation in the the Electrophysiology Department by Dr. Ada Hanley on 1 December 2022.  Nature of the rhythm disturbance not entirely clear if SVT with aberrancy (typical RBBB morphology) vs fascicular VT. a 30-day ambulatory monitor as well as cardiac MRI was recommended at that time.    Further review of systems is otherwise negative/noncontributory (medical record and 13 point review of systems reviewed as well and pertinent positives noted).    Cardiac Diagnostics   Telemetry (personally reviewed): Sinus rhythm.    Echocardiogram 18 July 2021 (personally reviewed):  1. Normal left ventricular size and systolic performance with a visually estimated ejection fraction of 65%.  2. No significant valvular heart disease is identified on this study.  3. Normal right ventricular size and systolic performance.    Twelve-lead ECG (personally reviewed) 1 January 2023 at 1507: Sinus rhythm with heart rate of 81 bpm.  Cannot rule out septal infarct  "by ECG criteria.  Unchanged from 21 June 2022.    Twelve-lead ECG (personally reviewed) 1 January 2023 at 1506: Sinus rhythm with heart rate of 81 bpm.  Cannot rule out septal infarct by ECG criteria.    Twelve-lead ECG (personally reviewed) 23 January 2022: Supraventricular tachycardia with heart rate of 207 bpm.      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 LEFT EYE Occurred at time of IOL/ERM      Past Surgical History:   Procedure Laterality Date     APPENDECTOMY OPEN       GYN SURGERY       ORTHOPEDIC SURGERY            Family History/Social History/Risk Factors   Patient does not smoke.  Family history reviewed, and History reviewed. No pertinent family history.       Physical Examination   BP (!) 143/78   Pulse 71   Temp 97.7  F (36.5  C) (Oral)   Resp 18   Ht 1.676 m (5' 6\")   Wt 68 kg (150 lb)   SpO2 97%   BMI 24.21 kg/m    Wt Readings from Last 5 Encounters:   01/02/23 68 kg (150 lb)   12/01/22 70.3 kg (155 lb)   11/23/22 70.3 kg (155 lb)   06/21/22 70.3 kg (155 lb)   07/19/21 71.7 kg (158 lb 1.6 oz)     Wt Readings from Last 3 Encounters:   01/02/23 68 kg (150 lb)   12/01/22 70.3 kg (155 lb)   11/23/22 70.3 kg (155 lb)       Intake/Output Summary (Last 24 hours) at 1/2/2023 0647  Last data filed at 1/2/2023 0400  Gross per 24 hour   Intake 1120 ml   Output 1500 ml   Net -380 ml     The patient is alert and oriented times three. Sclerae are anicteric. Mucosal membranes are moist. Jugular venous pressure is normal. No significant " adenopathy/thyromegally appreciated. Lungs are fairly clear with reasonable expansion. On cardiovascular exam, the patient has a regular S1 and S2. Abdomen is soft and non-tender. Extremities reveal no clubbing, cyanosis, or edema.         Imaging      CT scan of chest 1 January 2023:  1. No pulmonary emboli. Normal thoracic aorta.   2. Mild to moderate focal atheromatous plaque proximal LAD coronary artery.   3. Several strands of linear atelectasis scattered throughout the mid and lower lungs.   4. No pleural effusion.   5. Small esophageal hiatal hernia.    Chest radiograph 1 January 2023:  1. Small layering left pleural effusion with associated atelectasis and/or consolidation.   2. Right lung is clear. Heart size is normal.   3. Right rotator cuff repair changes.   4. Atherosclerosis.    Lab Results     Recent Labs   Lab 01/01/23  1752 01/01/23  1506   WBC  --  5.4   HGB  --  12.4   MCV  --  90   PLT  --  234   * 128*   POTASSIUM  --  3.4*   CHLORIDE  --  98   CO2  --  22   BUN  --  13   CR  --  0.68   ANIONGAP  --  8   KINJAL  --  8.5   GLC  --  102   ALBUMIN  --  3.3*   PROTTOTAL  --  6.4   BILITOTAL  --  0.3   ALKPHOS  --  104   ALT  --  15   AST  --  26   LIPASE  --  18     Recent Labs   Lab Test 06/21/22 2001   BNP 44     No lab results found in last 7 days.    Invalid input(s): LDLCALC  Lab Results   Component Value Date     01/01/2023     11/17/2012    CO2 22 01/01/2023    CO2 25 11/17/2012    BUN 13 01/01/2023    BUN 13 11/17/2012     Lab Results   Component Value Date    WBC 5.4 01/01/2023    WBC 7.1 11/17/2012    HGB 12.4 01/01/2023    HGB 13.0 11/17/2012    HCT 37.0 01/01/2023    HCT 38.6 11/17/2012    MCV 90 01/01/2023    MCV 94 11/17/2012     01/01/2023     11/17/2012       Lab Results   Component Value Date    TROPONINI <0.01 01/01/2023    TROPONINI <0.01 06/21/2022    TROPONINI <0.01 06/21/2022     Lab Results   Component Value Date    TSH 2.80 11/23/2022          Current Inpatient Scheduled Medications   Scheduled Meds:    dexamethasone  6 mg Oral Daily     fluticasone  1 spray Both Nostrils Daily     LORazepam  0.5 mg Oral At Bedtime     pantoprazole  40 mg Oral BID     pravastatin  10 mg Oral At Bedtime     Continuous Infusions:    sodium chloride 75 mL/hr at 01/02/23 0248          Medications Prior to Admission   Prior to Admission medications    Medication Sig Start Date End Date Taking? Authorizing Provider   acetaminophen (TYLENOL) 500 MG tablet Take 500-1,000 mg by mouth every 6 hours as needed for mild pain   Yes Unknown, Entered By History   Calcium Carbonate-Vitamin D 600-5 MG-MCG CAPS Take 1 capsule by mouth daily   Yes Reported, Patient   cetirizine (ZYRTEC) 10 MG tablet Take 10 mg by mouth daily   Yes Reported, Patient   Cholecalciferol 10 MCG (400 UNIT) CAPS Take 400 Units by mouth daily   Yes Reported, Patient   fluticasone (FLONASE) 50 MCG/ACT nasal spray Spray 1 spray into both nostrils daily   Yes Reported, Patient   guaiFENesin (MUCINEX) 600 MG 12 hr tablet Take 1,200 mg by mouth 2 times daily as needed for congestion   Yes Unknown, Entered By History   LORazepam (ATIVAN) 0.5 MG tablet TAKE ONE TABLET BY MOUTH AT BEDTIME AS NEEDED FOR ANXIETY 11/28/22  Yes Reported, Patient   multivitamin, therapeutic with minerals (THERA-VIT-M) TABS Take 1 tablet by mouth daily.     Yes Reported, Patient   omeprazole (PRILOSEC) 40 MG DR capsule Take 40 mg by mouth daily 3/10/22  Yes Reported, Patient   polyethylene glycol (MIRALAX) 17 GM/Dose powder Take 17 g by mouth daily as needed for constipation 7/19/21  Yes Qamar Acosta MD   pravastatin (PRAVACHOL) 10 MG tablet Take 10 mg by mouth At Bedtime 10/12/22  Yes Reported, Patient

## 2023-01-02 NOTE — PROGRESS NOTES
"PRIMARY DIAGNOSIS: \"GENERIC\" NURSING  OUTPATIENT/OBSERVATION GOALS TO BE MET BEFORE DISCHARGE:  1. ADLs back to baseline: No    2. Activity and level of assistance: Up with standby assistance.    3. Pain status: Pain free.    4. Return to near baseline physical activity: No     Discharge Planner Nurse   Safe discharge environment identified: Yes  Barriers to discharge: No       Entered by: Brisa Duff RN 01/02/2023 5:33 PM     Please review provider order for any additional goals.   Nurse to notify provider when observation goals have been met and patient is ready for discharge.    Pt just arrived on this unit at 1620, she is resting comfortably.  Able to make needs known.    "

## 2023-01-02 NOTE — CONSULTS
Care Management Initial Consult    General Information  Assessment completed with: Patient, Via phone in ED #10  Type of CM/SW Visit: Initial Assessment    Primary Care Provider verified and updated as needed: Yes   Readmission within the last 30 days: no previous admission in last 30 days      Reason for Consult: discharge planning  Advance Care Planning: Advance Care Planning Reviewed: no concerns identified, other (see comments) (Has forms at home not yet filled out)     General Information Comments: Covid++    Communication Assessment  Patient's communication style: spoken language (English or Bilingual)    Hearing Difficulty or Deaf: no   Wear Glasses or Blind: yes    Cognitive  Cognitive/Neuro/Behavioral: WDL                      Living Environment:   People in home: spouse     Current living Arrangements: apartment      Able to return to prior arrangements: yes  Living Arrangement Comments: Lives with     Family/Social Support:  Care provided by: self  Provides care for: no one  Marital Status:   , Children  Jack       Description of Support System: Supportive, Involved    Support Assessment: Adequate family and caregiver support    Current Resources:   Patient receiving home care services: No     Community Resources: None  Equipment currently used at home: none  Supplies currently used at home: None    Employment/Financial:  Employment Status: retired        Financial Concerns: No concerns identified         Lifestyle & Psychosocial Needs:  Social Determinants of Health     Tobacco Use: Medium Risk     Smoking Tobacco Use: Former     Smokeless Tobacco Use: Never     Passive Exposure: Not on file   Alcohol Use: Not on file   Financial Resource Strain: Not on file   Food Insecurity: Not on file   Transportation Needs: Not on file   Physical Activity: Not on file   Stress: Not on file   Social Connections: Not on file   Intimate Partner Violence: Not on file   Depression: Not on file    Housing Stability: Not on file       Functional Status:  Prior to admission patient needed assistance:   Dependent ADLs:: Independent  Dependent IADLs:: Cleaning, Shopping, Transportation  Assesssment of Functional Status: Not at  functional baseline    Mental Health Status:          Chemical Dependency Status:              Values/Beliefs:  Spiritual, Cultural Beliefs, Evangelical Practices, Values that affect care:                 Additional Information:  Patient arrived via EMS to  ED for evaluation of  nausea, vomiting and diarrhea.  VSS,  in rig. No SOB. But reports a cough. Was around grandson on [place] who later tested positive for Covid .    Information gathered from patient via phone in  ED #10. Reports she is independent with all I/ADLs except for transportation. Lives in an apartment with  Moises. Uses no assistive devices; no home care services; rarely drives. Manages and self-administers own medications. Explained MOON / Observation Status to patient who verbalized understanding.  Plans to return home with either  [place] or her daughter-in-law transporting patient on discharge. Other needs pending clinical progress.      DEEP BAXTER, RN/CM

## 2023-01-02 NOTE — PROGRESS NOTES
Pt arrived on this unit at approximately 1620.      Reviewed call light system, oriented pt to room answered all of her questions to the best of my ability.      She is resting comfortably on room air, denies pain at this time.  She has ordered dinner.

## 2023-01-02 NOTE — PROGRESS NOTES
[facility] MEDICINE PROGRESS NOTE      Summary:  82 year old female into [facility] ER for syncope.  Patient is being worked up  With the outpatient cardiology clinic via monitor and planned cardiac MRI.  Hospital course  Is remarkable for a positive covid test on admission, hyponatremia.  Imaging is unremarkable.  She is on dexamethasone.  The sodium improved after ivf.        Problem list:    1.   COVID: dexamethasone,   2.   Hyponatremia due to volume depletion; improved  3.  Vomiting, diarrhea: improved  4.  Hiatal hernia: abdominal pain today; xray nonspecific;   5.  CAD: cardiac stress MRI as outpatient  6.  Syncope:   7.  History of tachycardia: cardiology input appreciated; planned cardiac MRI   Changed to stress MRI to be done at [facility]; planned for discharge   With different cardiac monitor;   8.  Disposition: if oral intake stable today; discharge tomorrow       Ashia Tafoya MD  [facility]  [facility]  Phone: #[phone]       Interval History/Subjective: I am having upper abdominal pain though ate breakfast   Without issues    Physical Exam/Objective:  Temp:  [97.7  F (36.5  C)-98.4  F (36.9  C)] 97.8  F (36.6  C)  Pulse:  [62-90] 76  Resp:  [13-28] 16  BP: (133-166)/(67-92) 154/75  SpO2:  [91 %-98 %] 96 %  Body mass index is 24.21 kg/m .    GENERAL:  Alert, appears comfortable, in no acute distress, appears stated age   HEAD:  Normocephalic, without obvious abnormality, atraumatic   EYES:  PERRL, conjunctiva/corneas clear, no scleral icterus, EOM's intact   NOSE: Nares normal, septum midline, mucosa normal, no drainage   THROAT: Lips, mucosa, and tongue normal; teeth and gums normal, mouth moist   NECK: Supple, symmetrical, trachea midline   BACK:   Symmetric, no curvature, ROM normal   LUNGS:   Clear to auscultation bilaterally, no rales, rhonchi, or wheezing, symmetric chest rise on inhalation, respirations unlabored    CHEST WALL:  No tenderness or deformity   HEART:  Regular rate and rhythm, S1 and S2 normal, no murmur, rub, or gallop    ABDOMEN:   Soft, non-tender, bowel sounds active all four quadrants, no masses, no organomegaly, no rebound or guarding   EXTREMITIES: Extremities normal, atraumatic, no cyanosis or edema    SKIN: Dry to touch, no exanthems in the visualized areas   NEURO: Alert, oriented x3, moves all four extremities freely   PSYCH: Cooperative, behavior is appropriate      Data reviewed today: I personally reviewed all new medications, labs, imaging/diagnostics reports over the past 24 hours. Pertinent findings include:    Imaging:   Recent Results (from the past 24 hour(s))   XR Chest Port 1 View    Narrative    EXAM: XR CHEST PORT 1 VIEW  LOCATION: [facility]  DATE/TIME: 1/1/2023 5:32 PM    INDICATION: cough  COMPARISON: 11/23/2022      Impression    IMPRESSION: Small layering left pleural effusion with associated atelectasis and/or consolidation. Right lung is clear. Heart size is normal. Right rotator cuff repair changes. Atherosclerosis.   CT Chest Pulmonary Embolism w Contrast    Narrative    EXAM: CT CHEST PULMONARY EMBOLISM W CONTRAST  LOCATION: [facility]  DATE/TIME: 1/1/2023 7:11 PM    INDICATION: Shortness of breath. Left pleural effusion at today's earlier CXR  COMPARISON: 01/01/2023 CXR 5:15 PM  TECHNIQUE: CT chest pulmonary angiogram during arterial phase injection of IV contrast. Multiplanar reformats and MIP reconstructions were performed. Dose reduction techniques were used.   CONTRAST: ISOVUE 370 90mL    FINDINGS:  ANGIOGRAM CHEST: Pulmonary arteries are normal caliber with no pulmonary emboli. Normal caliber thoracic aorta with no evidence of acute aortic syndrome.    LUNGS AND PLEURA: Several strands of linear atelectasis scattered throughout the mid and lower lungs. Lungs otherwise clear. No pleural effusion.    MEDIASTINUM/AXILLAE:  Heart size normal. No pericardial effusion. No lymphadenopathy. Small esophageal hiatal hernia.    CORONARY ARTERY CALCIFICATION: Mild to moderate focal atheromatous plaque proximal LAD coronary artery.    UPPER ABDOMEN: Unremarkable.    MUSCULOSKELETAL: No bone lesion or fracture. Bones are demineralized.      Impression    IMPRESSION:  1.  No pulmonary emboli. Normal thoracic aorta.  2.  Mild to moderate focal atheromatous plaque proximal LAD coronary artery.  3.  Several strands of linear atelectasis scattered throughout the mid and lower lungs.  4.  No pleural effusion.  5.  Small esophageal hiatal hernia.   XR Abdomen Port 1 View    Narrative    EXAM: XR ABDOMEN PORT 1 VIEW  LOCATION: [facility]  DATE/TIME: 1/2/2023 2:54 PM    INDICATION: abdominal pain  COMPARISON: 02/17/2022      Impression    IMPRESSION: Nonobstructive bowel gas pattern. No gross pneumoperitoneum, although limited detection on supine radiograph.    Lumbosacral fusion hardware and degenerative changes in the pelvis.       Labs:  Most Recent 3 BMP's:Recent Labs   Lab Test 01/02/23  0737 01/01/23  1752 01/01/23  1506 11/23/22  1544   * 131* 128* 135*   POTASSIUM 4.1  --  3.4* 3.4   CHLORIDE 102  --  98 99   CO2 21*  --  22 22   BUN 10  --  13 17.4   CR 0.58*  --  0.68 0.64   ANIONGAP 10  --  8 14   KINJAL 8.7  --  8.5 9.8     --  102 108*       Medications:   Personally Reviewed.  Medications     sodium chloride 75 mL/hr at 01/02/23 0703       atorvastatin  40 mg Oral QPM     dexamethasone  6 mg Oral Daily     fluticasone  1 spray Both Nostrils Daily     LORazepam  0.5 mg Oral At Bedtime     pantoprazole  40 mg Oral BID

## 2023-01-02 NOTE — ED NOTES
Patient stated that she was having some abdominal pain, requested tylenol. Patient then requested to get up the bathroom. Had a BM and gas and stated pain was improved. Requested to hold tylenol

## 2023-01-02 NOTE — ED NOTES
Assisted patient up the sink to complete ADLs. Patient reports some light headedness, but was stable on her feet. Minimal assistance provided

## 2023-01-03 ENCOUNTER — APPOINTMENT (OUTPATIENT)
Dept: CARDIOLOGY | Facility: CLINIC | Age: 83
End: 2023-01-03
Attending: INTERNAL MEDICINE
Payer: COMMERCIAL

## 2023-01-03 VITALS
WEIGHT: 158.73 LBS | BODY MASS INDEX: 25.51 KG/M2 | SYSTOLIC BLOOD PRESSURE: 169 MMHG | DIASTOLIC BLOOD PRESSURE: 83 MMHG | TEMPERATURE: 97.4 F | HEIGHT: 66 IN | RESPIRATION RATE: 18 BRPM | OXYGEN SATURATION: 95 % | HEART RATE: 76 BPM

## 2023-01-03 PROCEDURE — 250N000011 HC RX IP 250 OP 636: Performed by: EMERGENCY MEDICINE

## 2023-01-03 PROCEDURE — G0378 HOSPITAL OBSERVATION PER HR: HCPCS

## 2023-01-03 PROCEDURE — 250N000013 HC RX MED GY IP 250 OP 250 PS 637: Performed by: EMERGENCY MEDICINE

## 2023-01-03 PROCEDURE — 96361 HYDRATE IV INFUSION ADD-ON: CPT

## 2023-01-03 PROCEDURE — 258N000003 HC RX IP 258 OP 636: Performed by: EMERGENCY MEDICINE

## 2023-01-03 PROCEDURE — 99238 HOSP IP/OBS DSCHRG MGMT 30/<: CPT | Performed by: EMERGENCY MEDICINE

## 2023-01-03 PROCEDURE — 999N000096 CARDIAC MOBILE TELEMETRY MONITOR

## 2023-01-03 RX ORDER — BENZONATATE 200 MG/1
200 CAPSULE ORAL 3 TIMES DAILY PRN
Qty: 20 CAPSULE | Refills: 0 | Status: SHIPPED | OUTPATIENT
Start: 2023-01-03 | End: 2023-07-02

## 2023-01-03 RX ORDER — AMLODIPINE BESYLATE 5 MG/1
5 TABLET ORAL DAILY
Qty: 30 TABLET | Refills: 0 | Status: SHIPPED | OUTPATIENT
Start: 2023-01-03 | End: 2023-01-18

## 2023-01-03 RX ORDER — AMLODIPINE BESYLATE 5 MG/1
5 TABLET ORAL DAILY
Status: DISCONTINUED | OUTPATIENT
Start: 2023-01-03 | End: 2023-01-03 | Stop reason: HOSPADM

## 2023-01-03 RX ADMIN — DEXAMETHASONE 6 MG: 2 TABLET ORAL at 08:52

## 2023-01-03 RX ADMIN — FLUTICASONE PROPIONATE 1 SPRAY: 50 SPRAY, METERED NASAL at 08:52

## 2023-01-03 RX ADMIN — ACETAMINOPHEN 650 MG: 325 TABLET ORAL at 06:32

## 2023-01-03 RX ADMIN — ACETAMINOPHEN 650 MG: 325 TABLET ORAL at 01:44

## 2023-01-03 RX ADMIN — BENZONATATE 200 MG: 100 CAPSULE ORAL at 01:49

## 2023-01-03 RX ADMIN — SODIUM CHLORIDE: 9 INJECTION, SOLUTION INTRAVENOUS at 06:35

## 2023-01-03 RX ADMIN — AMLODIPINE BESYLATE 5 MG: 5 TABLET ORAL at 12:44

## 2023-01-03 RX ADMIN — PANTOPRAZOLE SODIUM 40 MG: 20 TABLET, DELAYED RELEASE ORAL at 08:52

## 2023-01-03 ASSESSMENT — ACTIVITIES OF DAILY LIVING (ADL)
ADLS_ACUITY_SCORE: 27
ADLS_ACUITY_SCORE: 26
ADLS_ACUITY_SCORE: 27
ADLS_ACUITY_SCORE: 27
ADLS_ACUITY_SCORE: 26
ADLS_ACUITY_SCORE: 26
ADLS_ACUITY_SCORE: 27

## 2023-01-03 NOTE — PLAN OF CARE
"PRIMARY DIAGNOSIS: \"GENERIC\" NURSING  OUTPATIENT/OBSERVATION GOALS TO BE MET BEFORE DISCHARGE:  ADLs back to baseline: Yes    Activity and level of assistance: Up with standby assistance.    Pain status: Improved-controlled with oral pain medications.    Return to near baseline physical activity: Yes     Discharge Planner Nurse   Safe discharge environment identified: Yes  Barriers to discharge: Yes - needs to be medically cleared for discharge       Entered by: Earnestine Stoner RN 01/03/2023      Please review provider order for any additional goals.   Nurse to notify provider when observation goals have been met and patient is ready for discharge.Goal Outcome Evaluation:                        "

## 2023-01-03 NOTE — PROGRESS NOTES
"PRIMARY DIAGNOSIS: \"GENERIC\" NURSING  OUTPATIENT/OBSERVATION GOALS TO BE MET BEFORE DISCHARGE:  1. ADLs back to baseline: Yes    2. Activity and level of assistance: Up with standby assistance.    3. Pain status: Pain free.    4. Return to near baseline physical activity: Yes     Discharge Planner Nurse   Safe discharge environment identified: Yes  Barriers to discharge: No       Entered by: Brisa Duff RN 01/03/2023 12:58 PM     Please review provider order for any additional goals.   Nurse to notify provider when observation goals have been met and patient is ready for discharge.    Pt eager to discharge home today.    "

## 2023-01-03 NOTE — PROGRESS NOTES
"PRIMARY DIAGNOSIS: \"GENERIC\" NURSING  OUTPATIENT/OBSERVATION GOALS TO BE MET BEFORE DISCHARGE:  1. ADLs back to baseline: Yes    2. Activity and level of assistance: Up with standby assistance.    3. Pain status: Pain free.    4. Return to near baseline physical activity: Yes     Discharge Planner Nurse   Safe discharge environment identified: Yes  Barriers to discharge: No       Entered by: Brisa Duff RN 01/03/2023 12:59 PM     Please review provider order for any additional goals.   Nurse to notify provider when observation goals have been met and patient is ready for discharge.    Cardiac monitoring will be set up prior to discharge.  Pt has contacted her daughter in law for transportation.    "

## 2023-01-03 NOTE — DISCHARGE SUMMARY
[facility] MEDICINE  DISCHARGE SUMMARY     Primary Care Physician: Jenise Christensen  Admission Date: 1/1/2023   Discharge Provider: Ashia Tafoya MD Discharge Date: 1/3/2023   Diet:   Active Diet and Nourishment Order   Procedures     Room Service     Regular Diet Adult     Diet       Code Status: Full Code   Activity: as tolerates        Condition at Discharge: Stable     REASON FOR PRESENTATION(See Admission Note for Details)   Syncope, vomiting    PRINCIPAL & ACTIVE DISCHARGE DIAGNOSES     1.  Syncope  2.  COVID  3.  Hyponatremia  4.  Hiatal hernia  5.  Hypokalemia  6.  RBBB  7.  CAD    PENDING LABS     Unresulted Labs Ordered in the Past 30 Days of this Admission     No orders found from 12/2/2022 to 1/2/2023.            PROCEDURES ( this hospitalization only)          RECOMMENDATIONS TO OUTPATIENT PROVIDER FOR F/U VISIT     Follow-up Appointments     Follow-up and recommended labs and tests       CARDIAC STRESS MRI TO BE SCHEDULED AT [facility]  FOLLOW INSTRUCTIONS FOR YOUR CARDIAC MONITOR (30 DAY)  KEEP A BLOOD PRESSURE DIARY; if your numbers are greater than 140/90 on  Several occasions start the norvasc daily    Follow with your cardiologist per discussion                 DISPOSITION     Home with cardiology monitor and follow up    SUMMARY OF HOSPITAL COURSE:      82-year-old female presented to [facility] ER on January 1, 2023 after presenting for syncope.  She was sitting watching the TV which passed out it was was witnessed by her  patient has had similar events through the last November.  She is being seen by the cardiology clinic for work-up    Patient tested intermittently positive for COVID.  She is hemodynamically stable sodium levels on admission was 128.  She was given volume replacement and this improved.  Patient's not septic nor severely ill.  She had 1 low potassium level of 3.4.      Was replaced per protocol.  She was seen by the cardiology team.  EKG  on arrival is normal sinus and nonischemic.  She was seen in formal consultation by EP clinic Dr. Lainez on December 1, 2022 she has a known right bundle branch block morphology tachyarrhythmia.  Cardiac monitoring for 30 days was recommended though she did not tolerate    Dr. Vega from our service on the patient.  Arrange for a different type of monitor on discharge showed no tachyarrhythmias while in the hospital.  No further syncopal events. She ambulates safely blood pressure slightly elevated while in hospital we discussed medications.  Her pulse is in the 60s to 80s therefore beta-blockers were not recommended.  I will give her Norvasc prescription for 5 mg daily she will do blood pressure monitoring at home.  If her values remain elevated medication for she was educated    Patient has a pending cardiac MRI.  Change to a stress cardiac MRI to evaluate for underlying  Coronary vascular atherosclerosis will be at [facility].  The information is in the system.    Patient understands for COVID diagnosis.  She is discharged with Norvasc 5 mg daily and Tessalon for the COVID.  She otherwise is discharged in stable and improved condition      Discharge Medications with Med changes:     Current Discharge Medication List      START taking these medications    Details   amLODIPine (NORVASC) 5 MG tablet Take 1 tablet (5 mg) by mouth daily  Qty: 30 tablet, Refills: 0    Associated Diagnoses: Benign essential hypertension      atorvastatin (LIPITOR) 40 MG tablet Take 1 tablet (40 mg) by mouth every evening  Qty: 30 tablet, Refills: 0    Associated Diagnoses: Atherosclerosis of native coronary artery of native heart without angina pectoris      benzonatate (TESSALON) 200 MG capsule Take 1 capsule (200 mg) by mouth 3 times daily as needed for cough  Qty: 20 capsule, Refills: 0    Associated Diagnoses: Essential hypertension         CONTINUE these medications which have NOT CHANGED    Details   acetaminophen  (TYLENOL) 500 MG tablet Take 500-1,000 mg by mouth every 6 hours as needed for mild pain      Calcium Carbonate-Vitamin D 600-5 MG-MCG CAPS Take 1 capsule by mouth daily      cetirizine (ZYRTEC) 10 MG tablet Take 10 mg by mouth daily      Cholecalciferol 10 MCG (400 UNIT) CAPS Take 400 Units by mouth daily      fluticasone (FLONASE) 50 MCG/ACT nasal spray Spray 1 spray into both nostrils daily      guaiFENesin (MUCINEX) 600 MG 12 hr tablet Take 1,200 mg by mouth 2 times daily as needed for congestion      LORazepam (ATIVAN) 0.5 MG tablet TAKE ONE TABLET BY MOUTH AT BEDTIME AS NEEDED FOR ANXIETY      multivitamin, therapeutic with minerals (THERA-VIT-M) TABS Take 1 tablet by mouth daily.        omeprazole (PRILOSEC) 40 MG DR capsule Take 40 mg by mouth daily      polyethylene glycol (MIRALAX) 17 GM/Dose powder Take 17 g by mouth daily as needed for constipation  Qty: 510 g    Associated Diagnoses: Constipation, unspecified constipation type         STOP taking these medications       pravastatin (PRAVACHOL) 10 MG tablet Comments:   Reason for Stopping:                               Consults   Cardiology     CARDIOLOGY IP CONSULT  CARE MANAGEMENT / SOCIAL WORK IP CONSULT    Immunizations given this encounter     Most Recent Immunizations   Administered Date(s) Administered     COVID-19 Vaccine 12+ (Pfizer 2022) 05/29/2022     COVID-19 Vaccine 12+ (Pfizer) 12/18/2021     FLU 6-35 months 09/22/2010     FLUAD(HD)65+ QUAD 09/17/2021     Flu 65+ Years 09/26/2019     Flu, Unspecified 10/15/2021     Influenza (IIV3) PF 10/20/2010     Pneumo Conj 13-V (2010&after) 12/03/2015     Pneumococcal 23 valent 11/15/2013     Td (Adult), Adsorbed 01/01/1998     Tdap (Adacel,Boostrix) 11/15/2013     Zoster vaccine recombinant adjuvanted (SHINGRIX) 12/03/2020             SIGNIFICANT IMAGING FINDINGS     Results for orders placed or performed during the hospital encounter of 01/01/23   XR Chest Port 1 View    Impression    IMPRESSION:  Small layering left pleural effusion with associated atelectasis and/or consolidation. Right lung is clear. Heart size is normal. Right rotator cuff repair changes. Atherosclerosis.   CT Chest Pulmonary Embolism w Contrast    Impression    IMPRESSION:  1.  No pulmonary emboli. Normal thoracic aorta.  2.  Mild to moderate focal atheromatous plaque proximal LAD coronary artery.  3.  Several strands of linear atelectasis scattered throughout the mid and lower lungs.  4.  No pleural effusion.  5.  Small esophageal hiatal hernia.   XR Abdomen Port 1 View    Impression    IMPRESSION: Nonobstructive bowel gas pattern. No gross pneumoperitoneum, although limited detection on supine radiograph.    Lumbosacral fusion hardware and degenerative changes in the pelvis.       SIGNIFICANT LABORATORY FINDINGS     Most Recent 3 BMP's:Recent Labs   Lab Test 01/02/23  0737 01/01/23  1752 01/01/23  1506 11/23/22  1544   * 131* 128* 135*   POTASSIUM 4.1  --  3.4* 3.4   CHLORIDE 102  --  98 99   CO2 21*  --  22 22   BUN 10  --  13 17.4   CR 0.58*  --  0.68 0.64   ANIONGAP 10  --  8 14   KINJAL 8.7  --  8.5 9.8     --  102 108*           Discharge Orders        Reason for your hospital stay    Syncope, covid, hypertension     Follow-up and recommended labs and tests     CARDIAC STRESS MRI TO BE SCHEDULED AT [facility]  FOLLOW INSTRUCTIONS FOR YOUR CARDIAC MONITOR (30 DAY)  KEEP A BLOOD PRESSURE DIARY; if your numbers are greater than 140/90 on  Several occasions start the norvasc daily    Follow with your cardiologist per discussion     Activity    As tolerates     Diet    Cardiac heart healthy       Examination   Physical Exam   Temp:  [97.2  F (36.2  C)-97.7  F (36.5  C)] 97.4  F (36.3  C)  Pulse:  [69-86] 76  Resp:  [16-18] 18  BP: (145-179)/(72-88) 169/83  SpO2:  [94 %-96 %] 95 %  Wt Readings from Last 1 Encounters:   01/03/23 72 kg (158 lb 11.7 oz)       General Appearance: Alert, eating; no complaints  Respiratory: clear  bilaterally  Cardiovascular: regular  GI: soft  Skin: no rashes      Please see EMR for more detailed significant labs, imaging, consultant notes etc.    IAshia MD, personally saw the patient today and spent less than or equal to 30 minutes discharging this patient.    Ashia Tafoya MD  [facility]    CC:Jenise Christensen

## 2023-01-03 NOTE — PROGRESS NOTES
Care Management Discharge Note    Discharge Date: 01/03/2023       Discharge Disposition: Home    Discharge Services: None    Discharge DME: None    Discharge Transportation: family or friend will provide    Education Provided on the Discharge Plan:  AVS per bedside nurse.   Persons Notified of Discharge Plans: Patient, spouse  Patient/Family in Agreement with the Plan: yes    Additional Information:  RNCM spoke to patient's  Moises.  Patient to discharge to home.  Family to transport home at time of discharge.  Transport via daughter-in-law, per bedside RN.  Transferred Moises's phone call to nursing unit, per Moises's request.    Filiberto Taylor RN

## 2023-01-03 NOTE — PROGRESS NOTES
PRIMARY DIAGNOSIS: VOMITING AND DIARRHEA  OUTPATIENT/OBSERVATION GOALS TO BE MET BEFORE DISCHARGE:  1. ADLs back to baseline: Yes    2. Activity and level of assistance: Up with standby assistance.    3. Pain status: Improved-controlled with oral pain medications.    4. Return to near baseline physical activity: Yes     Discharge Planner Nurse   Safe discharge environment identified: Yes  Barriers to discharge: Yes       Entered by: Yogesh To RN 01/02/2023 11:57 PM     Please review provider order for any additional goals.   Nurse to notify provider when observation goals have been met and patient is ready for discharge.

## 2023-01-03 NOTE — PLAN OF CARE
Goal Outcome Evaluation:  Pt is ready and eager to discharge.  Family here to transport.  Cardiac monitoring has been set up and pt has been educated on this.

## 2023-01-03 NOTE — PLAN OF CARE
"PRIMARY DIAGNOSIS: \"GENERIC\" NURSING  OUTPATIENT/OBSERVATION GOALS TO BE MET BEFORE DISCHARGE:  ADLs back to baseline: Yes     Activity and level of assistance: Up with standby assistance.     Pain status: Improved-controlled with oral pain medications.     Return to near baseline physical activity: Yes          Discharge Planner Nurse   Safe discharge environment identified: Yes  Barriers to discharge: Yes - needs to be medically cleared for discharge       Entered by: Earnestine Stoner RN 01/03/2023   Please review provider order for any additional goals.   Nurse to notify provider when observation goals have been met and patient is ready for dischargeGoal Outcome Evaluation:                        "

## 2023-01-10 LAB
ATRIAL RATE - MUSE: 81 BPM
DIASTOLIC BLOOD PRESSURE - MUSE: NORMAL MMHG
INTERPRETATION ECG - MUSE: NORMAL
P AXIS - MUSE: 56 DEGREES
PR INTERVAL - MUSE: 158 MS
QRS DURATION - MUSE: 74 MS
QT - MUSE: 394 MS
QTC - MUSE: 457 MS
R AXIS - MUSE: 14 DEGREES
SYSTOLIC BLOOD PRESSURE - MUSE: NORMAL MMHG
T AXIS - MUSE: 43 DEGREES
VENTRICULAR RATE- MUSE: 81 BPM

## 2023-01-18 ENCOUNTER — ANCILLARY ORDERS (OUTPATIENT)
Dept: CARDIOLOGY | Facility: CLINIC | Age: 83
End: 2023-01-18

## 2023-01-18 ENCOUNTER — HOSPITAL ENCOUNTER (OUTPATIENT)
Dept: MRI IMAGING | Facility: HOSPITAL | Age: 83
Discharge: HOME | End: 2023-01-18
Attending: INTERNAL MEDICINE
Payer: COMMERCIAL

## 2023-01-18 VITALS — SYSTOLIC BLOOD PRESSURE: 123 MMHG | DIASTOLIC BLOOD PRESSURE: 58 MMHG | OXYGEN SATURATION: 98 % | HEART RATE: 105 BPM

## 2023-01-18 DIAGNOSIS — R55 SYNCOPE: Primary | ICD-10-CM

## 2023-01-18 DIAGNOSIS — R00.0 WIDE-COMPLEX TACHYCARDIA: ICD-10-CM

## 2023-01-18 LAB
ATRIAL RATE - MUSE: 101 BPM
ATRIAL RATE - MUSE: 89 BPM
DIASTOLIC BLOOD PRESSURE - MUSE: NORMAL MMHG
DIASTOLIC BLOOD PRESSURE - MUSE: NORMAL MMHG
INTERPRETATION ECG - MUSE: NORMAL
INTERPRETATION ECG - MUSE: NORMAL
P AXIS - MUSE: 39 DEGREES
P AXIS - MUSE: 50 DEGREES
PR INTERVAL - MUSE: 158 MS
PR INTERVAL - MUSE: 160 MS
QRS DURATION - MUSE: 70 MS
QRS DURATION - MUSE: 72 MS
QT - MUSE: 340 MS
QT - MUSE: 362 MS
QTC - MUSE: 440 MS
QTC - MUSE: 440 MS
R AXIS - MUSE: 0 DEGREES
R AXIS - MUSE: 1 DEGREES
SYSTOLIC BLOOD PRESSURE - MUSE: NORMAL MMHG
SYSTOLIC BLOOD PRESSURE - MUSE: NORMAL MMHG
T AXIS - MUSE: 13 DEGREES
T AXIS - MUSE: 26 DEGREES
VENTRICULAR RATE- MUSE: 101 BPM
VENTRICULAR RATE- MUSE: 89 BPM

## 2023-01-18 PROCEDURE — 999N000122 MR MYOCARDIUM  OVERREAD

## 2023-01-18 PROCEDURE — 75563 CARD MRI W/STRESS IMG & DYE: CPT

## 2023-01-18 PROCEDURE — 93018 CV STRESS TEST I&R ONLY: CPT | Performed by: GENERAL ACUTE CARE HOSPITAL

## 2023-01-18 PROCEDURE — 75563 CARD MRI W/STRESS IMG & DYE: CPT | Mod: 26 | Performed by: GENERAL ACUTE CARE HOSPITAL

## 2023-01-18 PROCEDURE — 93005 ELECTROCARDIOGRAM TRACING: CPT

## 2023-01-18 PROCEDURE — A9585 GADOBUTROL INJECTION: HCPCS | Performed by: INTERNAL MEDICINE

## 2023-01-18 PROCEDURE — 93010 ELECTROCARDIOGRAM REPORT: CPT | Performed by: INTERNAL MEDICINE

## 2023-01-18 PROCEDURE — 250N000011 HC RX IP 250 OP 636: Performed by: INTERNAL MEDICINE

## 2023-01-18 PROCEDURE — 93016 CV STRESS TEST SUPVJ ONLY: CPT | Performed by: INTERNAL MEDICINE

## 2023-01-18 PROCEDURE — 255N000002 HC RX 255 OP 636: Performed by: INTERNAL MEDICINE

## 2023-01-18 RX ORDER — REGADENOSON 0.08 MG/ML
0.4 INJECTION, SOLUTION INTRAVENOUS ONCE
Status: COMPLETED | OUTPATIENT
Start: 2023-01-18 | End: 2023-01-18

## 2023-01-18 RX ORDER — ALBUTEROL SULFATE 0.83 MG/ML
2.5 SOLUTION RESPIRATORY (INHALATION)
Status: DISCONTINUED | OUTPATIENT
Start: 2023-01-18 | End: 2023-01-18 | Stop reason: HOSPADM

## 2023-01-18 RX ORDER — GADOBUTROL 604.72 MG/ML
18 INJECTION INTRAVENOUS ONCE
Status: COMPLETED | OUTPATIENT
Start: 2023-01-18 | End: 2023-01-18

## 2023-01-18 RX ORDER — AMINOPHYLLINE 25 MG/ML
50 INJECTION, SOLUTION INTRAVENOUS
Status: DISCONTINUED | OUTPATIENT
Start: 2023-01-18 | End: 2023-01-18 | Stop reason: HOSPADM

## 2023-01-18 RX ORDER — PRAVASTATIN SODIUM 10 MG
10 TABLET ORAL AT BEDTIME
COMMUNITY

## 2023-01-18 RX ORDER — CAFFEINE CITRATE 20 MG/ML
60 SOLUTION INTRAVENOUS
Status: DISCONTINUED | OUTPATIENT
Start: 2023-01-18 | End: 2023-01-18 | Stop reason: HOSPADM

## 2023-01-18 RX ORDER — CAFFEINE 200 MG
200 TABLET ORAL
Status: DISCONTINUED | OUTPATIENT
Start: 2023-01-18 | End: 2023-01-18 | Stop reason: HOSPADM

## 2023-01-18 RX ADMIN — GADOBUTROL 18 ML: 604.72 INJECTION INTRAVENOUS at 10:49

## 2023-01-18 RX ADMIN — REGADENOSON 0.4 MG: 0.08 INJECTION, SOLUTION INTRAVENOUS at 11:38

## 2023-01-23 ENCOUNTER — TELEPHONE (OUTPATIENT)
Dept: CARDIOLOGY | Facility: CLINIC | Age: 83
End: 2023-01-23
Payer: COMMERCIAL

## 2023-01-23 NOTE — TELEPHONE ENCOUNTER
Dr. Hanley,  Please see MD Urgent report.  No follow up is scheduled.  Any new orders or recommendations?  Thank you,  Patricia  ----------------------------------  Attempted reaching patient for symptom assessment during time of Urgent event.  My direct number left for patient to return the call.  Alert came through 1/20/2023 @ 02:49.  -------------------------------------  Patient called back 10:50 am with reports of possible coughing fit which she has experienced quite a bit during the middle of the night since having Covid.  Assured patient an update will be forwarded to Dr. Hanley and patient will be contacted once reviewed.      -----------------------------------------------      -----------------------------------

## 2023-01-23 NOTE — TELEPHONE ENCOUNTER
----- Message from JOSIAH Loyd sent at 1/23/2023  7:21 AM CST -----  Regarding: Heart Monitor Notification  Good morning!    There is a heart monitor notification for Dr. Castro's review in the Everyday.me folder on [employer].    Thank you and have a great day!    Tee Ozuna MS CEP  Exercise Physiologist

## 2023-01-24 DIAGNOSIS — R00.0 WIDE-COMPLEX TACHYCARDIA: Primary | ICD-10-CM

## 2023-01-25 NOTE — TELEPHONE ENCOUNTER
Saúl Hanley MD Fleischhacker, Kelly, RN; P [facility] Ep Support Pool - [facility]  Caller: Unspecified (2 days ago,  7:42 AM)  Thank you for the message - looks like paroxysmal atrial fibrillation, which would be a new diagnosis for her.  We can discuss at follow-up scheduled in early February.       I'm not sure about her MCT situation - I had ordered a monitor when I saw her 12/1/2022 - CLEM read an MCT on her from 12/14/2022 to 1/12/2023 with only 3d of data.  I see an enrollment date of 1/5/2023 to 2/1/2023 on this monitor and no order for the monitor.  Can we clarify? Concerned that her present monitoring may be lost/no report generated if no correlating order exists?     Thank you!   Ada

## 2023-01-25 NOTE — CONFIDENTIAL NOTE
Cole Ledezma,     We are confused. So her report was finalized but looks like she is still wearing the monitor? Due to us getting a MD notify on Monday..do you know if she is still wearing the monitor??     Let me know.     Thank you!     Bety Pabon     CEP

## 2023-01-25 NOTE — TELEPHONE ENCOUNTER
Noted, follow up scheduled 2/2/23.    In regards to the monitor- it appears the monitor that the pt is currently wearing is the one that Dr. Castro ordered on 1/3 in the ED, it looks like the results from the previous monitor you ordered were accidentally entered under the wrong monitor that was ordered at a later time?   He noted that the pt was unable to complete the 30 day monitor due to irritation, so the pt turned it in early.    I will forward a message to the CVT team so they can review and possibly give further information on if this could even happen?    CVT- please see Dr. Singh concern below.  Thoughts?  Hopefully we are still able to receive the results on the current monitor she is wearing?  Any way to put the results on the correct order for the monitor that is already completed?      Brisa

## 2023-01-25 NOTE — CONFIDENTIAL NOTE
Thanks for reaching out Bety!   So the report that is finalized was actually the monitor results from the monitor ordered by Dr. Hanley and placed on 12/14.... the pt had difficulty with the monitor and turned it in early due to irritation to her skin.      She was then seen by Dr. Castro on 1/3 in the ER, and they placed another monitor at that time.  Unfortunately the results of the monitor she previously had on, was uploaded under the incorrect order (should have been uploaded to the one that shows it was placed on 12/14).    The pt is currently wearing the monitor that was ordered by Dr. Castro on 1/3 which is the one that states it is already resulted.       I hope this helps clarify?    The ask is that we move the results that were incorrectly uploaded on the 1/3 order, and place them on the correct order (12/14) so then the results of the monitor she is currently wearing are able to be placed on the 1/3 monitor order.    Let me know if you have further questions!    Brisa

## 2023-02-02 ENCOUNTER — OFFICE VISIT (OUTPATIENT)
Dept: CARDIOLOGY | Facility: CLINIC | Age: 83
End: 2023-02-02
Attending: INTERNAL MEDICINE
Payer: COMMERCIAL

## 2023-02-02 VITALS
DIASTOLIC BLOOD PRESSURE: 92 MMHG | WEIGHT: 146 LBS | HEIGHT: 65 IN | HEART RATE: 100 BPM | BODY MASS INDEX: 24.32 KG/M2 | OXYGEN SATURATION: 99 % | SYSTOLIC BLOOD PRESSURE: 152 MMHG

## 2023-02-02 DIAGNOSIS — R00.0 WIDE-COMPLEX TACHYCARDIA: ICD-10-CM

## 2023-02-02 DIAGNOSIS — I47.10 PAROXYSMAL SVT (SUPRAVENTRICULAR TACHYCARDIA) (H): Primary | ICD-10-CM

## 2023-02-02 PROCEDURE — 99214 OFFICE O/P EST MOD 30 MIN: CPT | Performed by: INTERNAL MEDICINE

## 2023-02-02 NOTE — PROGRESS NOTES
[facility]  Cardiac Electrophysiology  [address]  [place] [address]   Office: [phone]  Fax: [phone]     Cardiac Electrophysiology Follow-up Visit    Patient: Anabelle Cameron   : 1940     Primary Care Provider: Clinic, [facility]    CHIEF COMPLAINT/REASON FOR VISIT  Wide complex tachycardia    Assessment/Recommendations   Anabelle Cameron is a 81 year old female with syncope, meningioma, dyslipidemia, degenerative disk disease, anxiety referred for consultation regarding wide complex tachycardia.    Wide complex tachycardia - associated with palpitations without concurrent syncope or pre-syncope, noted for the first time on 2022.  Suspect SVT with aberrancy (typical RBBB morphology) over VT given normal cardiac MRI  Cardiac MRI 2023 showing LVEF 64% without fibrosis, infiltration or ischemia.    MCT 2022 to 2023 showing no tachycardia episodes with <1% SVEs and <1% VEs.   She would prefer expectant management for now, with plan to start beta-blocker therapy in case of recurrence.  She would prefer to defer invasive management at least until failure of medical therapies    Syncope - episodes sound vagally mediated in nature  - expectant management for now, can consider ILR in case of frequent recurrences    Follow up: as above         History of Present Illness   Anabelle Cameron is a 81 year old female with tachycardia, syncope, meningioma, dyslipidemia, degenerative disk disease, anxiety referred for consultation regarding tachycardia.    Mrs. Cameron notes acute first onset of palpitations and lightheadedness while a passenger in a car in the setting of several weeks of URI symptoms, and on 2022 underwent ER evaluation with note of episodes of regular RBBB tachycardia lasting for 15s.  She was treated with metoprolol 5mg IV with subsequent quiescence, and was discharged home without additional prescription.  She  "has not had recurrence of palpitations since that time. She underwent cardiac MRI 2023 showing LVEF 64% without fibrosis, infiltration or ischemia.  She underwent MCT 2022 to 2023 showing no tachycardia episodes with <1% SVEs and <1% VEs.  She denies recurrent episodes similar to her 2022 episode - on 2023 she notes different symptoms of palpitations after making a change to her nighttime sedative medications.    She reports approximately a few prior episodes of syncope (she cannot state how many) at times of dehydration of stress (first episode in , at her father's ) - these have not been associated with palpitations.  She has not sustained any prior injuries.    Her daughter passed away from cancer in 10/2022.       Physical Examination  Review of Systems   VITALS: BP (!) 152/92   Pulse 100   Ht 1.651 m (5' 5\")   Wt 66.2 kg (146 lb)   SpO2 99%   BMI 24.30 kg/m    Wt Readings from Last 3 Encounters:   23 72 kg (158 lb 11.7 oz)   22 70.3 kg (155 lb)   22 70.3 kg (155 lb)     CONSTITUTIONAL: well nourished, comfortable, no distress  EYES:  Conjunctivae pink, sclerae clear.    E/N/T:  Oral mucosa pink  RESPIRATORY:  Respiratory effort is normal  CARDIOVASCULAR:  normal S1 and S2  GASTROINTESTINAL:  Abdomen without masses or tenderness  EXTREMITIES:  No clubbing or cyanosis.    MUSCULOSKELETAL:  Overall grossly normal muscle strength  SKIN:  Overall, skin warm and dry, no lesions.  NEURO/PSYCH:  Oriented x 3 with normal affect.   Constitutional:  No weight loss or loss of appetite    Eyes:  No difficulty with vision, no double vision, no dry eyes  ENT:  No sore throat, difficulty swallowing; changes in hearing or tinnitus  Cardiovascular: As detailed above  Respiratory:  No cough  Musculoskeletal  No joint pain, muscle aches  Neurologic:  No syncope, lightheadedness, fainting spells   Hematologic: No easy bruising, excessive bleeding tendency "   Gastrointestinal:  No jaundice, abdominal pain or abdominal bloating  Genitourinary: No changes in urinary habits, no trouble urinating    Psychiatric: No anxiety or depression      Medical History  Surgical History   Past Medical History:   Diagnosis Date     Acute pancreatitis 2013     Allergy      DDD (degenerative disc disease), cervical 2008     Depressive disorder      Dysuria 2021     GERD (gastroesophageal reflux disease) 2010     Hip bursitis 2012     Meningioma (H) 2010     Osteoarthrosis 2013     Syncope      Venous tributary (branch) occlusion of retina 2021    Formatting of this note might be different from the original. LEFT EYE Occurred at time of IOL/ERM    Past Surgical History:   Procedure Laterality Date     APPENDECTOMY OPEN       GYN SURGERY       ORTHOPEDIC SURGERY           Family History Social History   No family history on file.     Social History     Tobacco Use     Smoking status: Former     Types: Cigarettes     Quit date:      Years since quittin.1     Smokeless tobacco: Never   Vaping Use     Vaping Use: Never used   Substance Use Topics     Alcohol use: Never     Comment: social     Drug use: No         Medications  Allergies     Current Outpatient Medications:      acetaminophen (TYLENOL) 500 MG tablet, Take 500-1,000 mg by mouth every 6 hours as needed for mild pain, Disp: , Rfl:      benzonatate (TESSALON) 200 MG capsule, Take 1 capsule (200 mg) by mouth 3 times daily as needed for cough, Disp: 20 capsule, Rfl: 0     Calcium Carbonate-Vitamin D 600-5 MG-MCG CAPS, Take 1 capsule by mouth daily, Disp: , Rfl:      cetirizine (ZYRTEC) 10 MG tablet, Take 10 mg by mouth daily as needed, Disp: , Rfl:      Cholecalciferol 10 MCG (400 UNIT) CAPS, Take 400 Units by mouth daily, Disp: , Rfl:      fluticasone (FLONASE) 50 MCG/ACT nasal spray, Spray 1 spray into both nostrils daily, Disp: , Rfl:      guaiFENesin (MUCINEX) 600 MG 12 hr tablet,  Take 1,200 mg by mouth 2 times daily as needed for congestion, Disp: , Rfl:      LORazepam (ATIVAN) 0.5 MG tablet, TAKE ONE TABLET BY MOUTH AT BEDTIME AS NEEDED FOR ANXIETY, Disp: , Rfl:      multivitamin, therapeutic with minerals (THERA-VIT-M) TABS, Take 1 tablet by mouth daily.  , Disp: , Rfl:      omeprazole (PRILOSEC) 40 MG DR capsule, Take 40 mg by mouth daily, Disp: , Rfl:      polyethylene glycol (MIRALAX) 17 GM/Dose powder, Take 17 g by mouth daily as needed for constipation, Disp: 510 g, Rfl:      pravastatin (PRAVACHOL) 10 MG tablet, Take 10 mg by mouth daily, Disp: , Rfl:      Allergies   Allergen Reactions     Levofloxacin Other (See Comments), Dizziness and Nausea     dizziness  Patient unable to tolerate side effects       Hydrocodone-Acetaminophen Other (See Comments)     Dizziness, fainting  Other reaction(s): Syncope  Vasovagal episode         Oxycodone-Acetaminophen Other (See Comments)     Dizziness, fainting          Lab Results    Chemistry CBC Cardiac Enzymes/BNP/TSH/INR   Recent Labs   Lab Test 11/23/22  1544   *   POTASSIUM 3.4   CHLORIDE 99   CO2 22   *   BUN 17.4   CR 0.64   GFRESTIMATED 87   KINJAL 9.8     Recent Labs   Lab Test 11/23/22  1544 06/21/22 2001 07/18/21  0551   CR 0.64 0.51* 0.63          Recent Labs   Lab Test 11/23/22  1544   WBC 8.6   HGB 13.5   HCT 40.6   MCV 89        Recent Labs   Lab Test 11/23/22  1544 06/21/22 2001 07/18/21  0551   HGB 13.5 9.1* 11.8    Recent Labs   Lab Test 06/21/22  2301 06/21/22 2001 07/18/21  0551   TROPONINI <0.01 <0.01 <0.01     Recent Labs   Lab Test 06/21/22 2001   BNP 44     Recent Labs   Lab Test 11/23/22  1544   TSH 2.80     No results for input(s): INR in the last 16076 hours.      Data Review    ECGs (tracings independently reviewed)  11/23/2022 - short RP tachycardia 207bpm, typical RBBB QRS 110ms  11/23/2022 - SR 104bpm, QRS 72ms    Mobile cardiac telemetry monitoring from 12/14/2022 to 1/12/2023 (personally  reviewed)  Predominant underlying rhythm was sinus rhythm, 50 to 120bpm, average 79bpm.  No tachyarrhythmias.  No atrial fibrillation.  There were no pauses noted.  Rare supraventricular ectopic beats (<1%).  Rare premature ventricular contractions (<1%).  No symptoms recorded    1/18/2023 cardiac MRI with stress  1.  Pharmacological Regadenoson stress cardiac MRI is negative for inducible myocardial ischemia.   2.  No evidence of prior myocardial infarction, focal or diffuse nonvascular myocardial fibrosis, or  infiltrative disease.  3.  Normal left ventricular size, wall thickness and systolic function. The quantified left ventricular  ejection fraction is 64%.   4.  Normal right ventricular size and systolic function.  The quantified right ventricular ejection  fraction is 55%.  5.  No significant valvular abnormalities.    7/18/2021 TTE  1. Normal left ventricular size and systolic performance with a visually  estimated ejection fraction of 65%.  2. No significant valvular heart disease is identified on this study.  3. Normal right ventricular size and systolic performance.         Cc: Jenise Hanley MD  2/2/2023  3:45 PM

## 2023-02-02 NOTE — PATIENT INSTRUCTIONS
[facility]  Cardiac Electrophysiology  [address]  [place] [address]   Office: [phone]  Fax: [phone]       Thank you for seeing us in clinic today - it is a pleasure to be a part of your care team.  Below is a summary of our plan from today's visit.      You had episodes of tachycardia which led to ER evaluation 11/23/2022, at which time short salvos of tachycardia were noted and treated with a low dose of metoprolol intravenously.  Your cardiac MRI showed no structural heart disease, and your mobile cardiac monitoring showed no recurrent episodes.  The tachycardia episodes most likely represent supraventricular tachycardia (SVT), though we cannot entirely rule out a ventricular tachycardia.  We will plan for the following evaluation:  - you elected observation for now, with plan to start metoprolol orally in case of recurrent episodes.  You indicated that you would prefer to defer invasive management at least until failure of medical therapies    Please do not hesitate to be in touch with our office at [phone] with any questions that may arise.      Thank you for trusting us with your care,    Saúl Hanley MD  Clinical Cardiac Electrophysiology  [facility]  [address]  [place] [address]   Office: [phone]  Fax: [phone]

## 2023-02-02 NOTE — LETTER
2023    Jenise Christensen MD  [address]  [facility] [address]    RE: Anabelle Cameron       Dear Colleague,     I had the pleasure of seeing Anabelle Cameron in the [facility].     [facility]  Cardiac Electrophysiology  [address]  [place] [address]   Office: [phone]  Fax: [phone]     Cardiac Electrophysiology Follow-up Visit    Patient: Anabelle Cameron   : 1940     Primary Care Provider: Clinic, [facility]    CHIEF COMPLAINT/REASON FOR VISIT  Wide complex tachycardia    Assessment/Recommendations   Anabelle Cameron is a 81 year old female with syncope, meningioma, dyslipidemia, degenerative disk disease, anxiety referred for consultation regarding wide complex tachycardia.    Wide complex tachycardia - associated with palpitations without concurrent syncope or pre-syncope, noted for the first time on 2022.  Suspect SVT with aberrancy (typical RBBB morphology) over VT given normal cardiac MRI  Cardiac MRI 2023 showing LVEF 64% without fibrosis, infiltration or ischemia.    MCT 2022 to 2023 showing no tachycardia episodes with <1% SVEs and <1% VEs.   She would prefer expectant management for now, with plan to start beta-blocker therapy in case of recurrence.  She would prefer to defer invasive management at least until failure of medical therapies    Syncope - episodes sound vagally mediated in nature  - expectant management for now, can consider ILR in case of frequent recurrences    Follow up: as above         History of Present Illness   Anabelle Cameron is a 81 year old female with tachycardia, syncope, meningioma, dyslipidemia, degenerative disk disease, anxiety referred for consultation regarding tachycardia.    Mrs. Cameron notes acute first onset of palpitations and lightheadedness while a passenger in a car in the setting of several weeks of URI symptoms, and on 2022 underwent  "ER evaluation with note of episodes of regular RBBB tachycardia lasting for 15s.  She was treated with metoprolol 5mg IV with subsequent quiescence, and was discharged home without additional prescription.  She has not had recurrence of palpitations since that time. She underwent cardiac MRI 2023 showing LVEF 64% without fibrosis, infiltration or ischemia.  She underwent MCT 2022 to 2023 showing no tachycardia episodes with <1% SVEs and <1% VEs.  She denies recurrent episodes similar to her 2022 episode - on 2023 she notes different symptoms of palpitations after making a change to her nighttime sedative medications.    She reports approximately a few prior episodes of syncope (she cannot state how many) at times of dehydration of stress (first episode in , at her father's ) - these have not been associated with palpitations.  She has not sustained any prior injuries.    Her daughter passed away from cancer in 10/2022.       Physical Examination  Review of Systems   VITALS: BP (!) 152/92   Pulse 100   Ht 1.651 m (5' 5\")   Wt 66.2 kg (146 lb)   SpO2 99%   BMI 24.30 kg/m    Wt Readings from Last 3 Encounters:   23 72 kg (158 lb 11.7 oz)   22 70.3 kg (155 lb)   22 70.3 kg (155 lb)     CONSTITUTIONAL: well nourished, comfortable, no distress  EYES:  Conjunctivae pink, sclerae clear.    E/N/T:  Oral mucosa pink  RESPIRATORY:  Respiratory effort is normal  CARDIOVASCULAR:  normal S1 and S2  GASTROINTESTINAL:  Abdomen without masses or tenderness  EXTREMITIES:  No clubbing or cyanosis.    MUSCULOSKELETAL:  Overall grossly normal muscle strength  SKIN:  Overall, skin warm and dry, no lesions.  NEURO/PSYCH:  Oriented x 3 with normal affect.   Constitutional:  No weight loss or loss of appetite    Eyes:  No difficulty with vision, no double vision, no dry eyes  ENT:  No sore throat, difficulty swallowing; changes in hearing or tinnitus  Cardiovascular: As detailed " above  Respiratory:  No cough  Musculoskeletal  No joint pain, muscle aches  Neurologic:  No syncope, lightheadedness, fainting spells   Hematologic: No easy bruising, excessive bleeding tendency   Gastrointestinal:  No jaundice, abdominal pain or abdominal bloating  Genitourinary: No changes in urinary habits, no trouble urinating    Psychiatric: No anxiety or depression      Medical History  Surgical History   Past Medical History:   Diagnosis Date     Acute pancreatitis 2013     Allergy      DDD (degenerative disc disease), cervical 2008     Depressive disorder      Dysuria 2021     GERD (gastroesophageal reflux disease) 2010     Hip bursitis 2012     Meningioma (H) 2010     Osteoarthrosis 2013     Syncope      Venous tributary (branch) occlusion of retina 2021    Formatting of this note might be different from the original. LEFT EYE Occurred at time of IOL/ERM    Past Surgical History:   Procedure Laterality Date     APPENDECTOMY OPEN       GYN SURGERY       ORTHOPEDIC SURGERY           Family History Social History   No family history on file.     Social History     Tobacco Use     Smoking status: Former     Types: Cigarettes     Quit date:      Years since quittin.1     Smokeless tobacco: Never   Vaping Use     Vaping Use: Never used   Substance Use Topics     Alcohol use: Never     Comment: social     Drug use: No         Medications  Allergies     Current Outpatient Medications:      acetaminophen (TYLENOL) 500 MG tablet, Take 500-1,000 mg by mouth every 6 hours as needed for mild pain, Disp: , Rfl:      benzonatate (TESSALON) 200 MG capsule, Take 1 capsule (200 mg) by mouth 3 times daily as needed for cough, Disp: 20 capsule, Rfl: 0     Calcium Carbonate-Vitamin D 600-5 MG-MCG CAPS, Take 1 capsule by mouth daily, Disp: , Rfl:      cetirizine (ZYRTEC) 10 MG tablet, Take 10 mg by mouth daily as needed, Disp: , Rfl:      Cholecalciferol 10 MCG (400 UNIT) CAPS,  Take 400 Units by mouth daily, Disp: , Rfl:      fluticasone (FLONASE) 50 MCG/ACT nasal spray, Spray 1 spray into both nostrils daily, Disp: , Rfl:      guaiFENesin (MUCINEX) 600 MG 12 hr tablet, Take 1,200 mg by mouth 2 times daily as needed for congestion, Disp: , Rfl:      LORazepam (ATIVAN) 0.5 MG tablet, TAKE ONE TABLET BY MOUTH AT BEDTIME AS NEEDED FOR ANXIETY, Disp: , Rfl:      multivitamin, therapeutic with minerals (THERA-VIT-M) TABS, Take 1 tablet by mouth daily.  , Disp: , Rfl:      omeprazole (PRILOSEC) 40 MG DR capsule, Take 40 mg by mouth daily, Disp: , Rfl:      polyethylene glycol (MIRALAX) 17 GM/Dose powder, Take 17 g by mouth daily as needed for constipation, Disp: 510 g, Rfl:      pravastatin (PRAVACHOL) 10 MG tablet, Take 10 mg by mouth daily, Disp: , Rfl:      Allergies   Allergen Reactions     Levofloxacin Other (See Comments), Dizziness and Nausea     dizziness  Patient unable to tolerate side effects       Hydrocodone-Acetaminophen Other (See Comments)     Dizziness, fainting  Other reaction(s): Syncope  Vasovagal episode         Oxycodone-Acetaminophen Other (See Comments)     Dizziness, fainting          Lab Results    Chemistry CBC Cardiac Enzymes/BNP/TSH/INR   Recent Labs   Lab Test 11/23/22  1544   *   POTASSIUM 3.4   CHLORIDE 99   CO2 22   *   BUN 17.4   CR 0.64   GFRESTIMATED 87   KINJAL 9.8     Recent Labs   Lab Test 11/23/22  1544 06/21/22 2001 07/18/21  0551   CR 0.64 0.51* 0.63          Recent Labs   Lab Test 11/23/22  1544   WBC 8.6   HGB 13.5   HCT 40.6   MCV 89        Recent Labs   Lab Test 11/23/22  1544 06/21/22 2001 07/18/21  0551   HGB 13.5 9.1* 11.8    Recent Labs   Lab Test 06/21/22  2301 06/21/22 2001 07/18/21  0551   TROPONINI <0.01 <0.01 <0.01     Recent Labs   Lab Test 06/21/22 2001   BNP 44     Recent Labs   Lab Test 11/23/22  1544   TSH 2.80     No results for input(s): INR in the last 66894 hours.      Data Review    ECGs (tracings independently  reviewed)  11/23/2022 - short RP tachycardia 207bpm, typical RBBB QRS 110ms  11/23/2022 - SR 104bpm, QRS 72ms    Mobile cardiac telemetry monitoring from 12/14/2022 to 1/12/2023 (personally reviewed)  Predominant underlying rhythm was sinus rhythm, 50 to 120bpm, average 79bpm.  No tachyarrhythmias.  No atrial fibrillation.  There were no pauses noted.  Rare supraventricular ectopic beats (<1%).  Rare premature ventricular contractions (<1%).  No symptoms recorded    1/18/2023 cardiac MRI with stress  1.  Pharmacological Regadenoson stress cardiac MRI is negative for inducible myocardial ischemia.   2.  No evidence of prior myocardial infarction, focal or diffuse nonvascular myocardial fibrosis, or  infiltrative disease.  3.  Normal left ventricular size, wall thickness and systolic function. The quantified left ventricular  ejection fraction is 64%.   4.  Normal right ventricular size and systolic function.  The quantified right ventricular ejection  fraction is 55%.  5.  No significant valvular abnormalities.    7/18/2021 TTE  1. Normal left ventricular size and systolic performance with a visually  estimated ejection fraction of 65%.  2. No significant valvular heart disease is identified on this study.  3. Normal right ventricular size and systolic performance.         Cc: Jenise Hanley MD  2/2/2023  3:45 PM    Thank you for allowing me to participate in the care of your patient.      Sincerely,     Saúl Hanley MD     [facility]  cc:   Saúl Hanley MD  [address]  [place] [address]

## 2023-02-03 ENCOUNTER — HOSPITAL ENCOUNTER (OUTPATIENT)
Dept: CARDIOLOGY | Facility: HOSPITAL | Age: 83
Discharge: HOME | End: 2023-02-03
Attending: INTERNAL MEDICINE
Payer: COMMERCIAL

## 2023-02-03 PROCEDURE — 93228 REMOTE 30 DAY ECG REV/REPORT: CPT | Performed by: INTERNAL MEDICINE

## 2023-02-03 PROCEDURE — 999N000096 CARDIAC MOBILE TELEMETRY MONITOR

## 2023-03-13 ENCOUNTER — HOSPITAL ENCOUNTER (INPATIENT)
Facility: CLINIC | Age: 83
LOS: 3 days | Discharge: ANOTHER HOSPITAL | DRG: 308 | End: 2023-03-16
Attending: EMERGENCY MEDICINE | Admitting: STUDENT IN AN ORGANIZED HEALTH CARE EDUCATION/TRAINING PROGRAM
Payer: COMMERCIAL

## 2023-03-13 ENCOUNTER — APPOINTMENT (OUTPATIENT)
Dept: CARDIOLOGY | Facility: CLINIC | Age: 83
DRG: 308 | End: 2023-03-13
Attending: STUDENT IN AN ORGANIZED HEALTH CARE EDUCATION/TRAINING PROGRAM
Payer: COMMERCIAL

## 2023-03-13 ENCOUNTER — APPOINTMENT (OUTPATIENT)
Dept: CT IMAGING | Facility: CLINIC | Age: 83
DRG: 308 | End: 2023-03-13
Attending: EMERGENCY MEDICINE
Payer: COMMERCIAL

## 2023-03-13 ENCOUNTER — APPOINTMENT (OUTPATIENT)
Dept: ULTRASOUND IMAGING | Facility: CLINIC | Age: 83
DRG: 308 | End: 2023-03-13
Attending: STUDENT IN AN ORGANIZED HEALTH CARE EDUCATION/TRAINING PROGRAM
Payer: COMMERCIAL

## 2023-03-13 DIAGNOSIS — R55 SYNCOPE, UNSPECIFIED SYNCOPE TYPE: ICD-10-CM

## 2023-03-13 DIAGNOSIS — I47.10 SVT (SUPRAVENTRICULAR TACHYCARDIA) (H): ICD-10-CM

## 2023-03-13 LAB
ABO/RH(D): NORMAL
ALBUMIN SERPL-MCNC: 3.7 G/DL (ref 3.5–5)
ALP SERPL-CCNC: 125 U/L (ref 45–120)
ALT SERPL W P-5'-P-CCNC: 16 U/L (ref 0–45)
ANION GAP SERPL CALCULATED.3IONS-SCNC: 10 MMOL/L (ref 5–18)
ANTIBODY SCREEN: NEGATIVE
APTT PPP: 27 SECONDS (ref 22–38)
AST SERPL W P-5'-P-CCNC: 25 U/L (ref 0–40)
ATRIAL RATE - MUSE: 108 BPM
ATRIAL RATE - MUSE: 132 BPM
BASOPHILS # BLD AUTO: 0.1 10E3/UL (ref 0–0.2)
BASOPHILS NFR BLD AUTO: 1 %
BILIRUB SERPL-MCNC: 0.5 MG/DL (ref 0–1)
BUN SERPL-MCNC: 15 MG/DL (ref 8–28)
CALCIUM SERPL-MCNC: 9.3 MG/DL (ref 8.5–10.5)
CHLORIDE BLD-SCNC: 104 MMOL/L (ref 98–107)
CO2 SERPL-SCNC: 24 MMOL/L (ref 22–31)
CREAT SERPL-MCNC: 0.65 MG/DL (ref 0.6–1.1)
DIASTOLIC BLOOD PRESSURE - MUSE: 72 MMHG
DIASTOLIC BLOOD PRESSURE - MUSE: NORMAL MMHG
EOSINOPHIL # BLD AUTO: 0.2 10E3/UL (ref 0–0.7)
EOSINOPHIL NFR BLD AUTO: 2 %
ERYTHROCYTE [DISTWIDTH] IN BLOOD BY AUTOMATED COUNT: 13.9 % (ref 10–15)
FLUAV RNA SPEC QL NAA+PROBE: NEGATIVE
FLUBV RNA RESP QL NAA+PROBE: NEGATIVE
GFR SERPL CREATININE-BSD FRML MDRD: 86 ML/MIN/1.73M2
GLUCOSE BLD-MCNC: 93 MG/DL (ref 70–125)
GLUCOSE BLDC GLUCOMTR-MCNC: 78 MG/DL (ref 70–99)
HCT VFR BLD AUTO: 39.1 % (ref 35–47)
HGB BLD-MCNC: 12.7 G/DL (ref 11.7–15.7)
HOLD SPECIMEN: NORMAL
IMM GRANULOCYTES # BLD: 0 10E3/UL
IMM GRANULOCYTES NFR BLD: 0 %
INR PPP: 1 (ref 0.85–1.15)
INTERPRETATION ECG - MUSE: NORMAL
INTERPRETATION ECG - MUSE: NORMAL
LVEF ECHO: NORMAL
LYMPHOCYTES # BLD AUTO: 2.4 10E3/UL (ref 0.8–5.3)
LYMPHOCYTES NFR BLD AUTO: 24 %
MAGNESIUM SERPL-MCNC: 1.8 MG/DL (ref 1.8–2.6)
MCH RBC QN AUTO: 30.3 PG (ref 26.5–33)
MCHC RBC AUTO-ENTMCNC: 32.5 G/DL (ref 31.5–36.5)
MCV RBC AUTO: 93 FL (ref 78–100)
MONOCYTES # BLD AUTO: 0.8 10E3/UL (ref 0–1.3)
MONOCYTES NFR BLD AUTO: 8 %
NEUTROPHILS # BLD AUTO: 6.6 10E3/UL (ref 1.6–8.3)
NEUTROPHILS NFR BLD AUTO: 65 %
NRBC # BLD AUTO: 0 10E3/UL
NRBC BLD AUTO-RTO: 0 /100
P AXIS - MUSE: 55 DEGREES
P AXIS - MUSE: 58 DEGREES
PLATELET # BLD AUTO: 318 10E3/UL (ref 150–450)
POTASSIUM BLD-SCNC: 4 MMOL/L (ref 3.5–5)
PR INTERVAL - MUSE: 154 MS
PR INTERVAL - MUSE: 158 MS
PROT SERPL-MCNC: 7 G/DL (ref 6–8)
QRS DURATION - MUSE: 68 MS
QRS DURATION - MUSE: 74 MS
QT - MUSE: 300 MS
QT - MUSE: 326 MS
QTC - MUSE: 444 MS
QTC - MUSE: 548 MS
R AXIS - MUSE: 26 DEGREES
R AXIS - MUSE: 28 DEGREES
RBC # BLD AUTO: 4.19 10E6/UL (ref 3.8–5.2)
RSV RNA SPEC NAA+PROBE: NEGATIVE
SARS-COV-2 RNA RESP QL NAA+PROBE: NEGATIVE
SODIUM SERPL-SCNC: 138 MMOL/L (ref 136–145)
SPECIMEN EXPIRATION DATE: NORMAL
SYSTOLIC BLOOD PRESSURE - MUSE: 151 MMHG
SYSTOLIC BLOOD PRESSURE - MUSE: NORMAL MMHG
T AXIS - MUSE: 55 DEGREES
T AXIS - MUSE: 56 DEGREES
TROPONIN I SERPL-MCNC: <0.01 NG/ML (ref 0–0.29)
TSH SERPL DL<=0.005 MIU/L-ACNC: 2.2 UIU/ML (ref 0.3–5)
VENTRICULAR RATE- MUSE: 132 BPM
VENTRICULAR RATE- MUSE: 170 BPM
WBC # BLD AUTO: 10.2 10E3/UL (ref 4–11)

## 2023-03-13 PROCEDURE — 999N000208 ECHOCARDIOGRAM COMPLETE

## 2023-03-13 PROCEDURE — 258N000003 HC RX IP 258 OP 636: Performed by: STUDENT IN AN ORGANIZED HEALTH CARE EDUCATION/TRAINING PROGRAM

## 2023-03-13 PROCEDURE — 82962 GLUCOSE BLOOD TEST: CPT

## 2023-03-13 PROCEDURE — 87637 SARSCOV2&INF A&B&RSV AMP PRB: CPT | Performed by: EMERGENCY MEDICINE

## 2023-03-13 PROCEDURE — 200N000001 HC R&B ICU

## 2023-03-13 PROCEDURE — 83735 ASSAY OF MAGNESIUM: CPT | Performed by: EMERGENCY MEDICINE

## 2023-03-13 PROCEDURE — 93880 EXTRACRANIAL BILAT STUDY: CPT

## 2023-03-13 PROCEDURE — 258N000003 HC RX IP 258 OP 636: Performed by: EMERGENCY MEDICINE

## 2023-03-13 PROCEDURE — 250N000009 HC RX 250: Performed by: EMERGENCY MEDICINE

## 2023-03-13 PROCEDURE — 93005 ELECTROCARDIOGRAM TRACING: CPT | Performed by: EMERGENCY MEDICINE

## 2023-03-13 PROCEDURE — 99222 1ST HOSP IP/OBS MODERATE 55: CPT | Performed by: INTERNAL MEDICINE

## 2023-03-13 PROCEDURE — 93306 TTE W/DOPPLER COMPLETE: CPT

## 2023-03-13 PROCEDURE — 84132 ASSAY OF SERUM POTASSIUM: CPT | Performed by: EMERGENCY MEDICINE

## 2023-03-13 PROCEDURE — C9803 HOPD COVID-19 SPEC COLLECT: HCPCS

## 2023-03-13 PROCEDURE — 86901 BLOOD TYPING SEROLOGIC RH(D): CPT | Performed by: EMERGENCY MEDICINE

## 2023-03-13 PROCEDURE — 96374 THER/PROPH/DIAG INJ IV PUSH: CPT | Mod: 59

## 2023-03-13 PROCEDURE — 85025 COMPLETE CBC W/AUTO DIFF WBC: CPT | Performed by: EMERGENCY MEDICINE

## 2023-03-13 PROCEDURE — 250N000011 HC RX IP 250 OP 636: Performed by: STUDENT IN AN ORGANIZED HEALTH CARE EDUCATION/TRAINING PROGRAM

## 2023-03-13 PROCEDURE — 84484 ASSAY OF TROPONIN QUANT: CPT | Performed by: EMERGENCY MEDICINE

## 2023-03-13 PROCEDURE — 250N000013 HC RX MED GY IP 250 OP 250 PS 637: Performed by: STUDENT IN AN ORGANIZED HEALTH CARE EDUCATION/TRAINING PROGRAM

## 2023-03-13 PROCEDURE — 86850 RBC ANTIBODY SCREEN: CPT | Performed by: EMERGENCY MEDICINE

## 2023-03-13 PROCEDURE — 93306 TTE W/DOPPLER COMPLETE: CPT | Mod: 26 | Performed by: INTERNAL MEDICINE

## 2023-03-13 PROCEDURE — 84443 ASSAY THYROID STIM HORMONE: CPT | Performed by: EMERGENCY MEDICINE

## 2023-03-13 PROCEDURE — 99291 CRITICAL CARE FIRST HOUR: CPT | Mod: 25,CS

## 2023-03-13 PROCEDURE — 85730 THROMBOPLASTIN TIME PARTIAL: CPT | Performed by: EMERGENCY MEDICINE

## 2023-03-13 PROCEDURE — 96375 TX/PRO/DX INJ NEW DRUG ADDON: CPT

## 2023-03-13 PROCEDURE — 99223 1ST HOSP IP/OBS HIGH 75: CPT | Mod: AI | Performed by: STUDENT IN AN ORGANIZED HEALTH CARE EDUCATION/TRAINING PROGRAM

## 2023-03-13 PROCEDURE — 85610 PROTHROMBIN TIME: CPT | Performed by: EMERGENCY MEDICINE

## 2023-03-13 PROCEDURE — 74174 CTA ABD&PLVS W/CONTRAST: CPT

## 2023-03-13 PROCEDURE — 36415 COLL VENOUS BLD VENIPUNCTURE: CPT | Performed by: EMERGENCY MEDICINE

## 2023-03-13 RX ORDER — METOPROLOL TARTRATE 25 MG/1
25 TABLET, FILM COATED ORAL 2 TIMES DAILY
Status: DISCONTINUED | OUTPATIENT
Start: 2023-03-14 | End: 2023-03-16 | Stop reason: HOSPADM

## 2023-03-13 RX ORDER — ESMOLOL HYDROCHLORIDE 20 MG/ML
50-200 INJECTION, SOLUTION INTRAVENOUS CONTINUOUS
Status: DISCONTINUED | OUTPATIENT
Start: 2023-03-13 | End: 2023-03-16 | Stop reason: HOSPADM

## 2023-03-13 RX ORDER — LIDOCAINE 40 MG/G
CREAM TOPICAL
Status: DISCONTINUED | OUTPATIENT
Start: 2023-03-13 | End: 2023-03-16 | Stop reason: HOSPADM

## 2023-03-13 RX ORDER — ONDANSETRON 4 MG/1
4 TABLET, ORALLY DISINTEGRATING ORAL EVERY 6 HOURS PRN
Status: DISCONTINUED | OUTPATIENT
Start: 2023-03-13 | End: 2023-03-16 | Stop reason: HOSPADM

## 2023-03-13 RX ORDER — FLUTICASONE PROPIONATE 50 MCG
1 SPRAY, SUSPENSION (ML) NASAL DAILY PRN
Status: DISCONTINUED | OUTPATIENT
Start: 2023-03-13 | End: 2023-03-16 | Stop reason: HOSPADM

## 2023-03-13 RX ORDER — SODIUM CHLORIDE 9 MG/ML
INJECTION, SOLUTION INTRAVENOUS CONTINUOUS
Status: ACTIVE | OUTPATIENT
Start: 2023-03-13 | End: 2023-03-13

## 2023-03-13 RX ORDER — PRAVASTATIN SODIUM 10 MG
10 TABLET ORAL AT BEDTIME
Status: DISCONTINUED | OUTPATIENT
Start: 2023-03-13 | End: 2023-03-16 | Stop reason: HOSPADM

## 2023-03-13 RX ORDER — ACETAMINOPHEN 500 MG
500-1000 TABLET ORAL EVERY 6 HOURS PRN
Status: DISCONTINUED | OUTPATIENT
Start: 2023-03-13 | End: 2023-03-16 | Stop reason: HOSPADM

## 2023-03-13 RX ORDER — BENZONATATE 100 MG/1
200 CAPSULE ORAL 3 TIMES DAILY PRN
Status: DISCONTINUED | OUTPATIENT
Start: 2023-03-13 | End: 2023-03-16 | Stop reason: HOSPADM

## 2023-03-13 RX ORDER — POLYETHYLENE GLYCOL 3350 17 G/17G
17 POWDER, FOR SOLUTION ORAL DAILY PRN
Status: DISCONTINUED | OUTPATIENT
Start: 2023-03-13 | End: 2023-03-16 | Stop reason: HOSPADM

## 2023-03-13 RX ORDER — PROCHLORPERAZINE 25 MG
12.5 SUPPOSITORY, RECTAL RECTAL EVERY 12 HOURS PRN
Status: DISCONTINUED | OUTPATIENT
Start: 2023-03-13 | End: 2023-03-16 | Stop reason: HOSPADM

## 2023-03-13 RX ORDER — METOPROLOL TARTRATE 1 MG/ML
5 INJECTION, SOLUTION INTRAVENOUS ONCE
Status: COMPLETED | OUTPATIENT
Start: 2023-03-13 | End: 2023-03-13

## 2023-03-13 RX ORDER — IOPAMIDOL 755 MG/ML
100 INJECTION, SOLUTION INTRAVASCULAR ONCE
Status: COMPLETED | OUTPATIENT
Start: 2023-03-13 | End: 2023-03-13

## 2023-03-13 RX ORDER — ONDANSETRON 2 MG/ML
4 INJECTION INTRAMUSCULAR; INTRAVENOUS EVERY 6 HOURS PRN
Status: DISCONTINUED | OUTPATIENT
Start: 2023-03-13 | End: 2023-03-16 | Stop reason: HOSPADM

## 2023-03-13 RX ORDER — PROCHLORPERAZINE MALEATE 5 MG
5 TABLET ORAL EVERY 6 HOURS PRN
Status: DISCONTINUED | OUTPATIENT
Start: 2023-03-13 | End: 2023-03-16 | Stop reason: HOSPADM

## 2023-03-13 RX ADMIN — SODIUM CHLORIDE 1000 ML: 9 INJECTION, SOLUTION INTRAVENOUS at 13:09

## 2023-03-13 RX ADMIN — METOPROLOL TARTRATE 5 MG: 5 INJECTION INTRAVENOUS at 13:12

## 2023-03-13 RX ADMIN — ESMOLOL HYDROCHLORIDE IN SODIUM CHLORIDE 50 MCG/KG/MIN: 20 INJECTION INTRAVENOUS at 20:57

## 2023-03-13 RX ADMIN — ACETAMINOPHEN 1000 MG: 500 TABLET ORAL at 20:32

## 2023-03-13 RX ADMIN — PRAVASTATIN SODIUM 10 MG: 10 TABLET ORAL at 21:27

## 2023-03-13 RX ADMIN — SODIUM CHLORIDE: 9 INJECTION, SOLUTION INTRAVENOUS at 16:22

## 2023-03-13 RX ADMIN — IOPAMIDOL 100 ML: 755 INJECTION, SOLUTION INTRAVENOUS at 14:13

## 2023-03-13 RX ADMIN — ESMOLOL HYDROCHLORIDE IN SODIUM CHLORIDE 50 MCG/KG/MIN: 20 INJECTION INTRAVENOUS at 13:35

## 2023-03-13 ASSESSMENT — ACTIVITIES OF DAILY LIVING (ADL)
ADLS_ACUITY_SCORE: 23
WEAR_GLASSES_OR_BLIND: YES
DRESSING/BATHING_DIFFICULTY: NO
WALKING_OR_CLIMBING_STAIRS_DIFFICULTY: NO
FALL_HISTORY_WITHIN_LAST_SIX_MONTHS: NO
DOING_ERRANDS_INDEPENDENTLY_DIFFICULTY: NO
VISION_MANAGEMENT: GLASSES
CONCENTRATING,_REMEMBERING_OR_MAKING_DECISIONS_DIFFICULTY: NO
ADLS_ACUITY_SCORE: 22
EQUIPMENT_CURRENTLY_USED_AT_HOME: GRAB BAR, TUB/SHOWER
DEPENDENT_IADLS:: INDEPENDENT
CHANGE_IN_FUNCTIONAL_STATUS_SINCE_ONSET_OF_CURRENT_ILLNESS/INJURY: NO
TOILETING_ISSUES: NO
ADLS_ACUITY_SCORE: 31
ADLS_ACUITY_SCORE: 35
ADLS_ACUITY_SCORE: 35
ADLS_ACUITY_SCORE: 23
DIFFICULTY_EATING/SWALLOWING: NO

## 2023-03-13 NOTE — ED PROVIDER NOTES
EMERGENCY DEPARTMENT ENCOUNTER      NAME: Anabelle Cameron  AGE: 82 year old female  YOB: 1940  MRN: [id number]  EVALUATION DATE & TIME: 3/13/2023 12:42 PM    PCP: Jenise Christensen    ED PROVIDER: Mai Ozuna M.D.      Chief Complaint   Patient presents with     Dizziness     Shaking         FINAL IMPRESSION:  1. SVT (supraventricular tachycardia) (H)    2. Syncope, unspecified syncope type          ED COURSE & MEDICAL DECISION MAKING:    ED Course as of 03/13/23 1348   Mon Mar 13, 2023   1244 Pt with h/o SVT paroxysmal supposed to be on beta blocker therapy per caridology note from February 2nd 2023 per chart review as followed by cardiologist Dr MANINDER Hanley at [facility] cardiology and with frequent runs of 4-5 beats only vtach with patient then having myoclonic twitches and breifly confused appearing with near syncope when they occur approximately once every few minutes, EKG with episode with NSR without ST changes otherwise, FSG WNL reassuringly, patient anxious overall with fatigue and lightheadedness today with otherwise ROS negative, NiHSS 0 on examination in triage, amenable to stat move to room, CTA r/o dissection with HR elevated and feeling so unwell, troponin with magnesium and TSH and electrolytes, they are amenable to plan to admit, will dw/ cardiology after imaging and IVF begun   1306 On cardiac monitor patient in and out of SVT episodes with repeat EKG in SVT for period with her feeling worsening lightheadedness when she goes into it, stat 5mg IV lopressor given with IVF, 2nd large bore IV in antecubital fossa, cardiology paged re; recurrent frequent episodes paroxysmal svt   1315 I spoke with Dr Ozuna from cardiology who recommends serial 5mg IV metoprolol if BP tolerates, begin metoprolol gtt, will likely  need ablation after reviewing EKG, see if [facility] bed available otherwise admit here and cardiology team will see her here today   1316 Charge RN seeking bed availability at [facility]  Hospital for potential transfer to EP center, CTA pending, will begin metoprolol gtt   1324 Esmolol gtt begun with no metoprolol gtt available, per SOC no beds available at [facility] or anywhere in system, patient wtih fewer SVT episodes after initial metoprolol 5mg IV, patient made NPO, charge RN seeking bed availability now, will need ICU bed per nursing team for esmolol gtt monitoring. ICU paged to discuss case along with hospitalist   1325 Nursing supervisor notes we have room 310 an ICU bed available for patient and they are cleaning the room now, ICU and hospitalist paged for admission   1344 I spoke with Dr Barros from ICU who recommends hospitalist manage and accepts patient to ICU for admission and hospitalist team to f/u CTA and paitent presumably to transfer to [facility] when EP procedure available, then back here. I spoke with hospitalist Yossi and patient admitted to him and he will f/u CTA r/o dissection       Pertinent Labs & Imaging studies reviewed. (See chart for details)    12:31 PM I met with the patient, obtained history, performed an initial exam, and discussed options and plan for diagnostics and treatment here in the ED. PPE worn including surgical mask, surgical gloves.  1:04 PM I reevaluated the patient.  1:14 PM I spoke with Dr. Ozuna, cardiologist.  1:18 PM Nursing is requesting for me to reevaluate the patient. I reevaluated the patient.  1:42 PM I spoke with Dr. Yogesh Barros, intensivist.  1:46 PM I spoke with the hospitalist Dr. Mccullough who accepts the patient.    Medical Decision Making    History:    Supplemental history from: Family Member/Significant Other    External Record(s) reviewed: Documented in chart, if applicable.    Work Up:    Chart documentation includes differential considered and any EKGs or imaging independently interpreted by provider, where specified.    In additional to work up documented, I considered the following work up: Documented in chart, if  "applicable.    External consultation:    Discussion of management with another provider: Documented in chart, if applicable    Complicating factors:    Care impacted by chronic illness: Hyperlipidemia    Care affected by social determinants of health: N/A    Disposition considerations: Admit.    Critical Care time was 85 minutes for this patient excluding procedures.      At the conclusion of the encounter I discussed the results of all of the tests and the disposition. The questions were answered. The patient or family acknowledged understanding and was agreeable with the care plan.     MEDICATIONS GIVEN IN THE EMERGENCY:  Medications   esmolol 20 mg/mL (BREVIBLOC) infusion 100 mL (50 mcg/kg/min × 68 kg Intravenous $New Bag 3/13/23 1335)   0.9% sodium chloride BOLUS (1,000 mLs Intravenous $New Bag 3/13/23 1309)   metoprolol (LOPRESSOR) injection 5 mg (5 mg Intravenous $Given 3/13/23 1312)       NEW PRESCRIPTIONS STARTED AT TODAY'S ER VISIT  New Prescriptions    No medications on file          =================================================================    HPI      Anabelle Cameron is a 82 year old female with PMHx of SVT, hyperlipidemia, meningioma who presents to the ED today via walk in with her spouse with lightheadedness. Patient states she started to develop lightheadedness earlier this morning at ~8 AM, but states she it was relatively mild at that time and she was able to drive to her eye clinic appointment. There, at ~11 AM, patient's spouse states the patient developed worsening symptoms and was \"wavering\" at that time. Patient was then sent to be seen by her PCP and subsequently sent to the ED for further evaluation.    Patient's spouse does not appreciate any slurred or abnormal speech. No reported chest pain, shortness of breath, nausea, visual changes. Patient notes she is having some mild abdominal pain with constipation. Patient's spouse states the patient has a history of \"passing " "out\".    Patient was recently evaluated by cardiology for SVT and there were talks of an ablation, but patient ultimately decided not to undergo this procedure. Patient states she is currently not on any beta-blockers.     Per chart review, patient was seen by Dr. Saúl Hanley, cardiology, on 02/02/23. Patient was initially referred to cardiology for wide complex tachycardia first noted on 11/23/22. No noted syncope or pre-syncope noted with this. Suspected SVT with aberrancy over VT given her normal cardiac MRI on 01/28. MCT 12/14/22 to 01/12/23 showed no tachycardia episodes with <1% SVEs and <1%. Patient opting for expectant management with plan to start on beta blocker therapy. Deferred invasive management at least until failure of medical therapies.    Patient was seen earlier today at [facility] where she was noted to be having multiple runs of SVT. Heart rate between  bpm.    REVIEW OF SYSTEMS   All other systems reviewed and are negative except as noted above in HPI.    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 LEFT EYE Occurred at time of IOL/ERM       PAST SURGICAL HISTORY:  Past Surgical History:   Procedure Laterality Date     APPENDECTOMY OPEN       GYN SURGERY       ORTHOPEDIC SURGERY         CURRENT MEDICATIONS:    acetaminophen (TYLENOL) 500 MG tablet  benzonatate (TESSALON) 200 MG capsule  Calcium Carbonate-Vitamin D 600-5 MG-MCG CAPS  Cholecalciferol 10 MCG (400 UNIT) CAPS  fluticasone (FLONASE) 50 MCG/ACT nasal spray  LORazepam " "(ATIVAN) 0.5 MG tablet  omeprazole (PRILOSEC) 40 MG DR capsule  polyethylene glycol (MIRALAX) 17 GM/Dose powder  pravastatin (PRAVACHOL) 10 MG tablet  multivitamin, therapeutic with minerals (THERA-VIT-M) TABS        ALLERGIES:  Allergies   Allergen Reactions     Levofloxacin Other (See Comments), Dizziness, Nausea and Unknown     dizziness  Patient unable to tolerate side effects    Patient unable to tolerate side effects  dizziness  Patient unable to tolerate side effects       Hydrocodone-Acetaminophen Other (See Comments) and Unknown     Dizziness, fainting  Other reaction(s): Syncope  Vasovagal episode      Vasovagal episode  Dizziness, fainting  Other reaction(s): Syncope  Vasovagal episode     Lisinopril Cough     Oxycodone-Acetaminophen Other (See Comments), Dizziness and Rash     Dizziness, fainting       FAMILY HISTORY:  History reviewed. No pertinent family history.    SOCIAL HISTORY:   Social History     Socioeconomic History     Marital status:    Tobacco Use     Smoking status: Former     Types: Cigarettes     Quit date:      Years since quittin.2     Smokeless tobacco: Never   Vaping Use     Vaping Use: Never used   Substance and Sexual Activity     Alcohol use: Never     Comment: social     Drug use: No       VITALS:  Patient Vitals for the past 24 hrs:   BP Temp Temp src Pulse Resp SpO2 Height Weight   23 1335 (!) 158/76 -- -- 89 22 99 % -- --   23 1330 136/74 -- -- 95 -- 97 % -- --   23 1325 (!) 152/72 -- -- 119 15 96 % -- --   23 1320 (!) 148/71 -- -- 93 11 100 % -- --   23 1315 134/62 -- -- 105 11 99 % -- --   23 1310 125/63 -- -- 107 16 99 % -- --   23 1255 (!) 151/72 -- -- 114 18 98 % -- --   23 1242 100/67 98.4  F (36.9  C) Temporal (!) 127 18 100 % 1.676 m (5' 6\") 68 kg (150 lb)       PHYSICAL EXAM    GENERAL: Awake, alert. Anxious appearing. Slightly diaphoretic.  HEENT: Normocephalic, atraumatic.  Pupils equal, round and " reactive.  Conjunctiva normal.  EOMI.  NECK: No stridor or apparent deformity.  PULMONARY: Symmetrical breath sounds without distress.  Lungs clear to auscultation bilaterally without wheezes, rhonchi or rales.  CARDIO: Regular rate and rhythm.  No significant murmur, rub or gallop.  Radial pulses strong and symmetrical.  ABDOMINAL: Abdomen soft, non-distended and non-tender to palpation.  No CVAT, no palpable hepatosplenomegaly.  EXTREMITIES: No lower extremity swelling or edema.    NEURO: Alert and oriented to person, place and time.  Cranial nerves grossly intact.  No focal motor deficit.  PSYCH: Anxious appearing.  SKIN: No rashes      LAB:  All pertinent labs reviewed and interpreted.  Results for orders placed or performed during the hospital encounter of 03/13/23   Result Value Ref Range    INR 1.00 0.85 - 1.15   Partial thromboplastin time   Result Value Ref Range    aPTT 27 22 - 38 Seconds   Comprehensive metabolic panel   Result Value Ref Range    Sodium 138 136 - 145 mmol/L    Potassium 4.0 3.5 - 5.0 mmol/L    Chloride 104 98 - 107 mmol/L    Carbon Dioxide (CO2) 24 22 - 31 mmol/L    Anion Gap 10 5 - 18 mmol/L    Urea Nitrogen 15 8 - 28 mg/dL    Creatinine 0.65 0.60 - 1.10 mg/dL    Calcium 9.3 8.5 - 10.5 mg/dL    Glucose 93 70 - 125 mg/dL    Alkaline Phosphatase 125 (H) 45 - 120 U/L    AST 25 0 - 40 U/L    ALT 16 0 - 45 U/L    Protein Total 7.0 6.0 - 8.0 g/dL    Albumin 3.7 3.5 - 5.0 g/dL    Bilirubin Total 0.5 0.0 - 1.0 mg/dL    GFR Estimate 86 >60 mL/min/1.73m2   Result Value Ref Range    Magnesium 1.8 1.8 - 2.6 mg/dL   Glucose by meter   Result Value Ref Range    GLUCOSE BY METER POCT 78 70 - 99 mg/dL   CBC with platelets and differential   Result Value Ref Range    WBC Count 10.2 4.0 - 11.0 10e3/uL    RBC Count 4.19 3.80 - 5.20 10e6/uL    Hemoglobin 12.7 11.7 - 15.7 g/dL    Hematocrit 39.1 35.0 - 47.0 %    MCV 93 78 - 100 fL    MCH 30.3 26.5 - 33.0 pg    MCHC 32.5 31.5 - 36.5 g/dL    RDW 13.9 10.0 -  15.0 %    Platelet Count 318 150 - 450 10e3/uL    % Neutrophils 65 %    % Lymphocytes 24 %    % Monocytes 8 %    % Eosinophils 2 %    % Basophils 1 %    % Immature Granulocytes 0 %    NRBCs per 100 WBC 0 <1 /100    Absolute Neutrophils 6.6 1.6 - 8.3 10e3/uL    Absolute Lymphocytes 2.4 0.8 - 5.3 10e3/uL    Absolute Monocytes 0.8 0.0 - 1.3 10e3/uL    Absolute Eosinophils 0.2 0.0 - 0.7 10e3/uL    Absolute Basophils 0.1 0.0 - 0.2 10e3/uL    Absolute Immature Granulocytes 0.0 <=0.4 10e3/uL    Absolute NRBCs 0.0 10e3/uL   ECG 12-LEAD WITH MUSE (LHE)   Result Value Ref Range    Systolic Blood Pressure 151 mmHg    Diastolic Blood Pressure 72 mmHg    Ventricular Rate 170 BPM    Atrial Rate 108 BPM    WA Interval 154 ms    QRS Duration 74 ms     ms    QTc 548 ms    P Axis 58 degrees    R AXIS 28 degrees    T Axis 56 degrees    Interpretation ECG       Sinus tachycardia with Premature supraventricular complexes  Otherwise normal ECG  When compared with ECG of 13-MAR-2023 12:36,  Fusion complexes are no longer Present  Premature ventricular complexes are no longer Present  Premature supraventricular complexes are now Present  Criteria for Septal infarct are no longer Present  Confirmed by SEE ED PROVIDER NOTE FOR, ECG INTERPRETATION ([id number]),  MORTEZA ENRIQUEZ ([id number]) on 3/13/2023 1:28:08 PM     ECG 12-LEAD WITH MUSE (LHE)   Result Value Ref Range    Systolic Blood Pressure  mmHg    Diastolic Blood Pressure  mmHg    Ventricular Rate 132 BPM    Atrial Rate 132 BPM    WA Interval 158 ms    QRS Duration 68 ms     ms    QTc 444 ms    P Axis 55 degrees    R AXIS 26 degrees    T Axis 55 degrees    Interpretation ECG       Sinus tachycardia with Premature ventricular complexes or Fusion complexes  Septal infarct (cited on or before 01-JAN-2023)  Abnormal ECG  When compared with ECG of 18-JAN-2023 12:15,  Fusion complexes are now Present  Premature ventricular complexes are now Present  Questionable change in initial  forces of Anteroseptal leads  Confirmed by SEE ED PROVIDER NOTE FOR, ECG INTERPRETATION ([id number]),  MORTEZA ENRIQUEZ ([id number]) on 3/13/2023 1:28:18 PM         RADIOLOGY:  Reviewed all pertinent imaging. Please see official radiology report.  CTA Chest Abdomen Pelvis w Contrast    (Results Pending)         EKG:    Reviewed and interpreted as: 13-MAR-2023 12:36. Sinus tachycardia with PVCs. Rate of 132 bpm. No ST changes. When compared with EKG of 18-JAN-2023, PVCs are now present.    Repeat EKG: Performed at 13:00. SVT with a rate of 170 bpm. When compared with EKG of 12:36, EKG is newly in SVT      I have independently reviewed and interpreted the EKG(s) documented above.        I, Kyrie Valentino, am serving as a scribe to document services personally performed by Dr. Mai Ozuna based on my observation and the provider's statements to me. I, Mai Ozuna MD attest that Kyrie Valentino is acting in a scribe capacity, has observed my performance of the services and has documented them in accordance with my direction.     Mai Ozuna MD  03/13/23 [id number]

## 2023-03-13 NOTE — ED NOTES
Expected Patient Referral to ED  11:56 AM    Referring Clinic/Provider:  [facility] - nurse call    Reason for referral/Clinical facts:  Seen for eye appointment, started feeling dizzy.  Coming by private vehicle.  EKG done there.      Recommendations provided:  Send to ED for further evaluation    Caller was informed that this institution does possess the capabilities and/or resources to provide for patient and should be transferred to our facility.    Discussed that if direct admit is sought and any hurdles are encountered, this ED would be happy to see the patient and evaluate.    Informed caller that recommendations provided are recommendations based only on the facts provided and that they responsible to accept or reject the advice, or to seek a formal in person consultation as needed and that this ED will see/treat patient should they arrive.      Zeeshan South MD  [facility] EMERGENCY ROOM  [address] [place] [id number]  [phone]       Zeeshan South MD  03/13/23 1157

## 2023-03-13 NOTE — PHARMACY-ADMISSION MEDICATION HISTORY
Pharmacy Note - Admission Medication History    Pertinent Provider Information: none     ______________________________________________________________________    Prior To Admission (PTA) med list completed and updated in EMR.       PTA Med List   Medication Sig Last Dose     acetaminophen (TYLENOL) 500 MG tablet Take 500-1,000 mg by mouth every 6 hours as needed for mild pain 3/12/2023     benzonatate (TESSALON) 200 MG capsule Take 1 capsule (200 mg) by mouth 3 times daily as needed for cough Past Week     Calcium Carbonate-Vitamin D 600-5 MG-MCG CAPS Take 1 capsule by mouth daily 3/13/2023     Cholecalciferol 10 MCG (400 UNIT) CAPS Take 400 Units by mouth daily 3/13/2023     fluticasone (FLONASE) 50 MCG/ACT nasal spray Spray 1 spray into both nostrils daily as needed Past Month     LORazepam (ATIVAN) 0.5 MG tablet TAKE ONE TABLET BY MOUTH AT BEDTIME AS NEEDED FOR ANXIETY 3/13/2023     omeprazole (PRILOSEC) 40 MG DR capsule Take 40 mg by mouth daily 3/13/2023     polyethylene glycol (MIRALAX) 17 GM/Dose powder Take 17 g by mouth daily as needed for constipation Past Week     pravastatin (PRAVACHOL) 10 MG tablet Take 10 mg by mouth At Bedtime 3/12/2023       [facility]  Method of interview communication: in-person    Summary of Changes to PTA Med List  ni changes    Patient was asked about OTC/herbal products specifically.  PTA med list reflects this.    In the past week, patient estimated taking medication this percent of the time:  greater than 90%.    Medication Affordability:       Allergies were reviewed, assessed, and updated with the patient.      Patient does not anticipate needing any multi-use medications during admission.    The information provided in this note is only as accurate as the sources available at the time of the update(s).    Thank you for the opportunity to participate in the care of this patient.    Porter Stoner RPH  3/13/2023 1:44  PM

## 2023-03-13 NOTE — CONSULTS
[facility]  [phone]          Assessment/Recommendations   Patient who has been evaluated by Dr. Castro and Dr. Hanley for syncope and tachyarrhythmias.  Patient felt to have SVT.  Offered beta-blocker and ablation but decided to see if symptoms would recur.  This morning awoke with a feeling of lightheadedness but this seemed to improve after having a little bit of breakfast.  She went to eye doctor appointment in [facility] and while she was sitting in the chair in the office, she passed out repetitively.  They noted her heart rate to be very fast and her  brought her up to the emergency room here at [facility].  She had repetitive tachycardia here which appears to be SVT with near syncopal or syncopal episodes.  She was given a bolus of IV metoprolol and started on esmolol drip and has been stable.  She feels fine now.    No other symptoms predating this such as infections.  She had a syncopal episode in January and was known to have COVID.  She had a monitor but she had taken it off prior to her episode.    Twelve-lead cardiogram here does show SVT with narrow complex and a heart rate of 170 bpm.  First EKG also personally reviewed and shows sinus rhythm going into SVT.    Electrolytes and magnesium are unremarkable.    Patient had a cardiac MRI which showed normal left ventricular ejection fraction, no fibrosis or infiltration.  Stress MRI was negative for myocardial ischemia as well.  Her rhythm disturbances of felt to be SVT.    Agree with beta-blocker therapy at this point in time to slow things down and we will convert her to oral beta-blocker therapy tomorrow.  If she is stable, we will message the electrophysiology team to get her potentially on the schedule for EP study and ablation.  She would be agreeable at this point.    Thank you for allowing us to participate in her care.           History of Present Illness/Subjective    Ms. Anabelle Cameron is a 82 year  old female with known history of wide-complex and narrow complex tachycardia.  She has been evaluated by Dr. Singh on 2 occasions and offered EP lab evaluation and beta-blockers but decided to wait to see if her symptoms would recur.  This morning she woke up just not feeling well.  Yesterday was relatively normal although she felt a little bit different yesterday as well.  Out to eat last night with no difficulty.  When she woke this morning about 8:00 she felt a little bit dizzy, a little bit lightheaded.  She was able to use the bathroom and go back to bed for a while and then have some breakfast and felt a little better.  She went to an eye doctor appointment [place].  While she was sitting in the actual doctor's office she started to pass out and noticed that her heart was racing.  No chest discomfort or shortness of breath but quite significant palpitations.  She was noted to have a rapid heartbeat and she passed out at least a couple times there and her  then drove her to the emergency department at [facility] where she again passed out and had narrow complex tachycardia at 170 bpm.  She was given IV metoprolol after discussion with our service and then she was placed on a esmolol drip.  She is feeling well now and her rhythm seems to have settled down.  She underwent a CT of her chest which is pending.    She denies a history of orthopnea, paroxysmal nocturnal dyspnea, peripheral edema, and does not have chest pain.  She does her own housework without any difficulties.  She is very active.  She does admit to having more stress in her life as their oldest child  of ovarian cancer in the fall  and this has been a struggle.    She has no history rheumatic fever, cerebrovascular accident or TIA.  She does take pravastatin for hyperlipidemia but is not diabetic and quit smoking about 50 years ago.        ECG: Personally reviewed.  See above    Clinically Significant Risk Factors Present on  "Admission                  Cardiovascular: Cardiac Arrhythmia: Paroxysmal tachycardia, unspecified            Physical Examination Review of Systems   BP (!) 157/89   Pulse 90   Temp 98.4  F (36.9  C) (Temporal)   Resp 16   Ht 1.676 m (5' 6\")   Wt 68 kg (150 lb)   SpO2 98%   BMI 24.21 kg/m    Body mass index is 24.21 kg/m .  Wt Readings from Last 3 Encounters:   03/13/23 68 kg (150 lb)   02/02/23 66.2 kg (146 lb)   01/03/23 72 kg (158 lb 11.7 oz)     General Appearance:   Alert, cooperative and in no acute distress.   ENT/Mouth: Patient wearing a mask.      EYES:  no scleral icterus, normal conjunctivae   Neck: JVP normal. No Hepatojugular reflux. Thyroid not visualized.   Chest/Lungs:   Lungs are clear to auscultation, equal chest wall expansion.   Cardiovascular:   S1, S2 without murmur ,clicks or rubs. Brachial, radial a pulses are intact and symetric. No carotid bruits noted   Abdomen:  Nontender.    Extremities: No cyanosis, clubbing or edema   Skin: no xanthelasma, warm.    Neurologic: normal arm movement bilateral, no tremors     Psychiatric: Appropriate affect.      Enc Vitals  BP: (!) 157/89  Pulse: 90  Resp: 16  Temp: 98.4  F (36.9  C)  Temp src: Temporal  SpO2: 98 %  Weight: 68 kg (150 lb)  Height: 167.6 cm (5' 6\")                                           Medical History  Surgical History Family History Social History   Past Medical History:   Diagnosis Date     Acute pancreatitis 4/26/2013     Allergy      DDD (degenerative disc disease), cervical 6/11/2008     Depressive disorder      Dysuria 7/17/2021     GERD (gastroesophageal reflux disease) 8/26/2010     Hip bursitis 7/13/2012     Meningioma (H) 8/26/2010     Osteoarthrosis 4/26/2013     Syncope      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 LEFT EYE Occurred at time of IOL/ERM    Past Surgical History:   Procedure Laterality Date     APPENDECTOMY OPEN       GYN " SURGERY       ORTHOPEDIC SURGERY      History reviewed. No pertinent family history. Social History     Socioeconomic History     Marital status:      Spouse name: Not on file     Number of children: Not on file     Years of education: Not on file     Highest education level: Not on file   Occupational History     Not on file   Tobacco Use     Smoking status: Former     Types: Cigarettes     Quit date:      Years since quittin.2     Smokeless tobacco: Never   Vaping Use     Vaping Use: Never used   Substance and Sexual Activity     Alcohol use: Never     Comment: social     Drug use: No     Sexual activity: Not on file   Other Topics Concern     Not on file   Social History Narrative     Not on file     Social Determinants of Health     Financial Resource Strain: Not on file   Food Insecurity: Not on file   Transportation Needs: Not on file   Physical Activity: Not on file   Stress: Not on file   Social Connections: Not on file   Intimate Partner Violence: Not on file   Housing Stability: Not on file          Medications  Allergies   Current Outpatient Medications   Medication Sig Dispense Refill     acetaminophen (TYLENOL) 500 MG tablet Take 500-1,000 mg by mouth every 6 hours as needed for mild pain       benzonatate (TESSALON) 200 MG capsule Take 1 capsule (200 mg) by mouth 3 times daily as needed for cough 20 capsule 0     Calcium Carbonate-Vitamin D 600-5 MG-MCG CAPS Take 1 capsule by mouth daily       Cholecalciferol 10 MCG (400 UNIT) CAPS Take 400 Units by mouth daily       fluticasone (FLONASE) 50 MCG/ACT nasal spray Spray 1 spray into both nostrils daily as needed       LORazepam (ATIVAN) 0.5 MG tablet TAKE ONE TABLET BY MOUTH AT BEDTIME AS NEEDED FOR ANXIETY       omeprazole (PRILOSEC) 40 MG DR capsule Take 40 mg by mouth daily       polyethylene glycol (MIRALAX) 17 GM/Dose powder Take 17 g by mouth daily as needed for constipation 510 g      pravastatin (PRAVACHOL) 10 MG tablet Take 10 mg  by mouth At Bedtime       multivitamin, therapeutic with minerals (THERA-VIT-M) TABS Take 1 tablet by mouth daily.        Allergies   Allergen Reactions     Levofloxacin Other (See Comments), Dizziness, Nausea and Unknown     dizziness  Patient unable to tolerate side effects    Patient unable to tolerate side effects  dizziness  Patient unable to tolerate side effects       Hydrocodone-Acetaminophen Other (See Comments) and Unknown     Dizziness, fainting  Other reaction(s): Syncope  Vasovagal episode      Vasovagal episode  Dizziness, fainting  Other reaction(s): Syncope  Vasovagal episode     Lisinopril Cough     Oxycodone-Acetaminophen Other (See Comments), Dizziness and Rash     Dizziness, fainting         Lab Results    Chemistry/lipid CBC Cardiac Enzymes/BNP/TSH/INR   Lab Results   Component Value Date    BUN 15 03/13/2023     03/13/2023    CO2 24 03/13/2023    Lab Results   Component Value Date    WBC 10.2 03/13/2023    HGB 12.7 03/13/2023    HCT 39.1 03/13/2023    MCV 93 03/13/2023     03/13/2023    Lab Results   Component Value Date    TROPONINI <0.01 03/13/2023    BNP 44 06/21/2022    TSH 2.20 03/13/2023    INR 1.00 03/13/2023

## 2023-03-13 NOTE — ED TRIAGE NOTES
Patient presents to ED after being sent from clinic, pt and  are not good historians, patient felt dizzy since she got up this morning @0800, she went to her eye appt this morning and from there was sent to clinic and had ECG and was told to come to ED, pt assessed by Dr. Laith MD.  Pt has hx of SVT.  Balbina Sepulveda RN.......3/13/2023 12:52 PM     Triage Assessment     Row Name 03/13/23 1249       Triage Assessment (Adult)    Airway WDL WDL       Respiratory WDL    Respiratory WDL WDL       Skin Circulation/Temperature WDL    Skin Circulation/Temperature WDL WDL       Cardiac WDL    Cardiac WDL X;rhythm    Cardiac Rhythm SVT;ST       Peripheral/Neurovascular WDL    Peripheral Neurovascular WDL WDL       Cognitive/Neuro/Behavioral WDL    Cognitive/Neuro/Behavioral WDL X  patient dizzy and lost control of bladder       [place] Coma Scale    Best Eye Response 4-->(E4) spontaneous    Best Motor Response 6-->(M6) obeys commands    Best Verbal Response 5-->(V5) oriented    [place] Coma Scale Score 15

## 2023-03-13 NOTE — ED NOTES
Pt has remained in NSR since administration of metoprolol. Denies CP or feeling SOB. Pt resting in room, call light within reach.

## 2023-03-13 NOTE — ED NOTES
"Once pt roomed noted pt having runs of SVT rate into 200's, pt states keeps passing out. Pt awake and alert and talking to RN during episodes. EKG obtained and given to MD and MD notified. Pt continues to be in and out of SVT rate of 200's, also had run of VT. MD called to room, pt states she \"keeps passing out\" with these episodes. MD at bedside. Pt given Metoprolol as ordered, pt A&Ox4 and will continue to monitor. Call light in reach  "

## 2023-03-13 NOTE — H&P
[facility]    History and Physical - Hospitalist Service       Date of Admission:  3/13/2023    Assessment & Plan      Anabelle Cameron is a 82 year old female admitted on 3/13/2023. She has a past medical history of hypertension, hyperlipidemia, reflux, depression, constipation, and previously established and known paroxysmal SVT.  She saw Dr. Hanley as an outpatient and in February had symptomatic SVT and was offered an ablation which she declined.  However, secondary to logistical issues, the patient not continued taking a beta-blocker; on day of admission now presenting with symptomatic SVT and syncope, in ER with on and off SVT; given metoprolol and started on esmolol drip and now rate controlled.      Plan for admission with cardiology consult and eventual transfer to [facility] for ablation.  Pending CT study from ER.  On admission is hemodynamically and vitally stable, with some elevated pressures SBP 150s, and nonremarkable labs. Dr. Ozuna of Cardiology made aware of case.    ----------------------------  Principal Problem:    Syncope, unspecified syncope type    SVT (supraventricular tachycardia) (H)    Assessment: Directly as above.  We will also obtain additional studies for part of syncope work-up, including orthostatics and ultrasound carotids and interval echo. Pending CT to rule out PE or dissection.    Plan:  -Admit to inpatient  -Consult cardiology  -Continue esmolol drip  -Follow-up on pending CT study from the ER  -Anticipate eventual transfer to [facility] for ablation  - npo for now; if no procedure then cardiology diet without caffeine   -Fall precautions  -Echo and ultrasound carotids  -Orthostatics  -Monitor clinically  -Gentle maintenance fluids -50 ccs/hr ordered for 10 hours  -updated pt's  at bedside   -interval basic AM labs for hospital day 1 tmr      GERD (gastroesophageal reflux disease)    Assessment: On Prilosec 40 mg once daily.  Stable.     Plan:  -Continue      Mixed hyperlipidemia    Assessment: On pravastatin.    Plan:  -Continue      Moderate major depression (H)    Assessment: Stable, not currently on medication.    Plan:  -Monitor clinically    Constipation  Assessment: stable, on regimen of daily miralax.  Plan:  - continue          Diet: NPO per Anesthesia Guidelines for Procedure/Surgery Except for: Meds, Ice Chips    DVT Prophylaxis: Pneumatic Compression Devices; may have procedure soon (possibly today or tmr)   Guillory Catheter: Not present  Lines: None     Cardiac Monitoring: None  Code Status: Full Code      Clinically Significant Risk Factors Present on Admission                                Disposition Plan      Expected Discharge Date: 03/15/2023      Destination: home with family            Chavo Mccullough MD  Hospitalist Service  [facility]  Securely message with [employer] (more info)  Text page via [employer] Paging/Directory     ______________________________________________________________________    Chief Complaint   Passing out     History is obtained from the patient and      History of Present Illness   Anabelle Cameron is a 82 year old female who has a past medical history of hypertension, hyperlipidemia, reflux, depression, constipation, and previously established and known paroxysmal SVT.  She saw Dr. Hanley as an outpatient and in February had symptomatic SVT and was offered an ablation which she declined.  However, secondary to logistical issues, the patient not continued taking a beta-blocker; on day of admission now presenting with symptomatic SVT and syncope.    Patient has actually not been taking a beta-blocker since her February visit with cardiology; she did have known and established paroxysmal SVT at that time and declined ablation.  Her symptoms have been on and off recently but on day of admission, she developed recurrent syncope, accompanied by preevent symptoms of lightheadedness and  room spinning dizziness as well as palpitations.  Her  is at bedside and confirms history.  We discussed admission and having cardiology see and eventual ablation and also the esmolol drip that she is currently receiving in ER.  She is on room air.      She confirms no other symptoms, including any chest pain or shortness of breath, no changes to bowel movements or urination-she is chronically constipated but this has been well controlled on MiraLAX.  There are no neurologic or sensory changes.  No pain anywhere.  She is full code.  Lives with her  in [place].     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 LEFT EYE Occurred at time of IOL/ERM       Past Surgical History   Past Surgical History:   Procedure Laterality Date     APPENDECTOMY OPEN       GYN SURGERY       ORTHOPEDIC SURGERY         Prior to Admission Medications   Prior to Admission Medications   Prescriptions Last Dose Informant Patient Reported? Taking?   Calcium Carbonate-Vitamin D 600-5 MG-MCG CAPS 3/13/2023  Yes Yes   Sig: Take 1 capsule by mouth daily   Cholecalciferol 10 MCG (400 UNIT) CAPS 3/13/2023  Yes Yes   Sig: Take 400 Units by mouth daily   LORazepam (ATIVAN) 0.5 MG tablet 3/13/2023  Yes Yes   Sig: TAKE ONE TABLET BY MOUTH AT BEDTIME AS NEEDED FOR ANXIETY   acetaminophen (TYLENOL) 500 MG tablet 3/12/2023  Yes Yes   Sig: Take 500-1,000 mg by mouth every 6 hours as needed for mild pain   benzonatate (TESSALON) 200 MG capsule Past Week  No Yes   Sig: Take 1 capsule (200 mg) by mouth 3  times daily as needed for cough   fluticasone (FLONASE) 50 MCG/ACT nasal spray Past Month  Yes Yes   Sig: Spray 1 spray into both nostrils daily as needed   multivitamin, therapeutic with minerals (THERA-VIT-M) TABS   Yes No   Sig: Take 1 tablet by mouth daily.     omeprazole (PRILOSEC) 40 MG DR capsule 3/13/2023  Yes Yes   Sig: Take 40 mg by mouth daily   polyethylene glycol (MIRALAX) 17 GM/Dose powder Past Week  No Yes   Sig: Take 17 g by mouth daily as needed for constipation   pravastatin (PRAVACHOL) 10 MG tablet 3/12/2023  Yes Yes   Sig: Take 10 mg by mouth At Bedtime      Facility-Administered Medications: None        Review of Systems    The 10 point Review of Systems is negative other than noted in the HPI or here.      Social History   I have reviewed this patient's social history and updated it with pertinent information if needed.  Social History     Tobacco Use     Smoking status: Former     Types: Cigarettes     Quit date:      Years since quittin.2     Smokeless tobacco: Never   Vaping Use     Vaping Use: Never used   Substance Use Topics     Alcohol use: Never     Comment: social     Drug use: No       Family History   Noncontributory to current case     Allergies   Allergies   Allergen Reactions     Levofloxacin Other (See Comments), Dizziness, Nausea and Unknown     dizziness  Patient unable to tolerate side effects    Patient unable to tolerate side effects  dizziness  Patient unable to tolerate side effects       Hydrocodone-Acetaminophen Other (See Comments) and Unknown     Dizziness, fainting  Other reaction(s): Syncope  Vasovagal episode      Vasovagal episode  Dizziness, fainting  Other reaction(s): Syncope  Vasovagal episode     Lisinopril Cough     Oxycodone-Acetaminophen Other (See Comments), Dizziness and Rash     Dizziness, fainting        Physical Exam   Vital Signs: Temp: 98.4  F (36.9  C) Temp src: Temporal BP: (!) 149/84 Pulse: 91   Resp: 25 SpO2: 95 % O2 Device: None (Room  air)    Weight: 150 lbs 0 oz      GENERAL:  Alert, appears comfortable, in no acute distress, appears stated age   HEAD:  Normocephalic, without obvious abnormality, atraumatic   EYES:  PERRL, conjunctiva/corneas clear, no scleral icterus, EOM's intact   NOSE: Nares normal, septum midline, mucosa normal, no drainage   THROAT: Lips, mucosa, and tongue normal; teeth and gums normal, mouth moist   NECK: Supple, symmetrical, trachea midline   BACK:   Symmetric, no curvature, ROM normal   LUNGS:   Clear to auscultation bilaterally, no rales, rhonchi, or wheezing, symmetric chest rise on inhalation, respirations unlabored, on room air    CHEST WALL:  No tenderness or conspicuous deformity   HEART:  Regular rate and rhythm, S1 and S2 normal, no murmur, rub, or gallop, no conspicuous jugular venous distention noted, peripheral pulses intact, mild nonpitting edema bilaterally   ABDOMEN:   Soft, non-tender, bowel sounds auscultated in all four quadrants, no masses, no organomegaly, no rebound or guarding   EXTREMITIES: Extremities normal, atraumatic, no cyanosis; mild bilateral nonpitting and ontender edema in bilateral lower legs    SKIN: Dry to touch, no exanthems in the visualized areas   NEURO: Alert, oriented x3, moves all four extremities of their own volition    PSYCH: Cooperative and behavior is appropriate        Medical Decision Making       76 MINUTES SPENT BY ME on the date of service doing chart review, history, exam, documentation & further activities per the note.      Data     I have personally reviewed the following data over the past 24 hrs:    10.2  \   12.7   / 318     138 104 15 /  93   4.0 24 0.65 \       ALT: 16 AST: 25 AP: 125 (H) TBILI: 0.5   ALB: 3.7 TOT PROTEIN: 7.0 LIPASE: N/A       TSH: 2.20 T4: N/A A1C: N/A       INR:  1.00 PTT:  27   D-dimer:  N/A Fibrinogen:  N/A       Imaging results reviewed over the past 24 hrs:   No results found for this or any previous visit (from the past 24 hour(s)).

## 2023-03-13 NOTE — CONSULTS
Care Management Initial Consult    General Information  Assessment completed with: Patient, Spouse or significant other,    Type of CM/SW Visit: Initial Assessment  Primary Care Provider verified and updated as needed: Yes   Readmission within the last 30 days: no previous admission in last 30 days   Reason for Consult: discharge planning, health care directive, transportation  Advance Care Planning: Advance Care Planning Reviewed: no concerns identified (Incomplete documents at home.)        Communication Assessment  Patient's communication style: spoken language (English or Bilingual)        Cognitive  Cognitive/Neuro/Behavioral: .WDL except (Episodes of dizziness)    Level of Consciousness: alert    Arousal Level: opens eyes spontaneously       Mood/Behavior: behavior appropriate to situation       Speech: spontaneous, clear    Living Environment:   People in home: spouse     Current living Arrangements: apartment      Able to return to prior arrangements: yes     Family/Social Support:  Care provided by: self, spouse/significant other ( assists as needed.)  Provides care for: no one, unable/limited ability to care for self  Marital Status:     Jack       Description of Support System: Supportive, Involved    Support Assessment: Adequate family and caregiver support, Adequate social supports (Pt states sufficient support available when at home.)    Current Resources:   Patient receiving home care services: No  Community Resources: None  Equipment currently used at home: grab bar, tub/shower  Supplies currently used at home: None    Employment/Financial:  Employment Status: retired     Financial Concerns: No concerns identified   Referral to Financial Worker: No     Lifestyle & Psychosocial Needs:  Social Determinants of Health     Tobacco Use: Medium Risk     Smoking Tobacco Use: Former     Smokeless Tobacco Use: Never     Passive Exposure: Not on file   Alcohol Use: Not on file   Financial  "Resource Strain: Not on file   Food Insecurity: Not on file   Transportation Needs: Not on file   Physical Activity: Not on file   Stress: Not on file   Social Connections: Not on file   Intimate Partner Violence: Not on file   Depression: Not on file   Housing Stability: Not on file     Functional Status:  Prior to admission patient needed assistance:   Dependent ADLs:: Independent (Pt states independence at baseline.)  Dependent IADLs:: Independent (Pt states independence at baseline.)  Assessment of Functional Status: Not at  functional baseline    Mental Health Status:  Mental Health Status: No Current Concerns       Chemical Dependency Status:  Chemical Dependency Status: No Current Concerns           Values/Beliefs:  Spiritual, Cultural Beliefs, Synagogue Practices, Values that affect care: no (None identified)             Additional Information:  Writer met with pt and her  Moises to introduce Care Management(CM) and obtain an initial assessment. Pt shares an apartment with her  and states being I/ADL independent at baseline. No DME used, services in-place, or any concerns expressed in regards to eventual return home at time of discharge.    03/13 per MARIA D Tijerina.-\"82 year old female with PMHx of SVT, hyperlipidemia, meningioma who presents to the ED today via walk in with her spouse with lightheadedness. Patient states she started to develop lightheadedness earlier this morning at ~8 AM, but states she it was relatively mild at that time and she was able to drive to her eye clinic appointment. There, at ~11 AM, patient's spouse states the patient developed worsening symptoms and was \"wavering\" at that time. Patient was then sent to be seen by her PCP and subsequently sent to the ED for further evaluation.     Patient's spouse does not appreciate any slurred or abnormal speech. No reported chest pain, shortness of breath, nausea, visual changes. Patient notes she is having some mild abdominal " "pain with constipation. Patient's spouse states the patient has a history of \"passing out\". Patient was recently evaluated by cardiology for SVT and there were talks of an ablation, but patient ultimately decided not to undergo this procedure. Patient states she is currently not on any beta-blockers.     Per chart review, patient was seen by Dr. Saúl Hanley, cardiology, on 02/02/23. Patient was initially referred to cardiology for wide complex tachycardia first noted on 11/23/22. No noted syncope or pre-syncope noted with this. Suspected SVT with aberrancy over VT given her normal cardiac MRI on 01/28. MCT 12/14/22 to 01/12/23 showed no tachycardia episodes with <1% SVEs and <1%. Patient opting for expectant management with plan to start on beta blocker therapy. Deferred invasive management at least until failure of medical therapies. Patient was seen earlier today at [facility] where she was noted to be having multiple runs of SVT. Heart rate between  bpm.\"     No pending consults currently. Anticipate return home after improvement. CM to follow for changes in current anticipated discharge disposition.  to transport.    Asa Torres RN        "

## 2023-03-13 NOTE — ED NOTES
"After metoprolol pt went back to NSR/ST. BP stable. Pt reports feeling better. Denies CP or SOB with episodes. Feels \"lightheaded\" with SVT.   "

## 2023-03-13 NOTE — PLAN OF CARE
Problem: Dysrhythmia  Goal: Normalized Cardiac Rhythm  Outcome: Progressing     Problem: Fatigue  Goal: Improved Activity Tolerance  Outcome: Progressing     Patient Aox4, pleasant, cooperative. Denies pain. Bedrest. Vitally stable on room air. Heart rate controlled in the 70s-80s. Esmolol infusing; blood pressure stable. Tolerating cardiac diet. Purewick in place.

## 2023-03-13 NOTE — ED NOTES
"EMERGENCY DEPARTMENT SIGN OUT NOTE        ED COURSE AND MEDICAL DECISION MAKING  Patient was signed out to me by Dr Mai Ozuna at 2:07 PM    In brief, Anabelle Cameron is a 82 year old female who initially presented with lightheadedness.      Patient states she started to develop lightheadedness earlier this morning at ~8 AM, but states she it was relatively mild at that time and she was able to drive to her eye clinic appointment. Then, at ~11 AM, patient's spouse states the patient developed worsening symptoms and was \"wavering\" at that time. Patient was then sent to be seen by her PCP and subsequently sent to the ED for further evaluation.     At time of sign out, disposition was pending ***    ED Course as of 03/13/23 1508   Mon Mar 13, 2023   1244 Pt with h/o SVT paroxysmal supposed to be on beta blocker therapy per caridology note from February 2nd 2023 per chart review as followed by cardiologist Dr MANINDER Hanley at [facility] cardiology and with frequent runs of 4-5 beats only vtach with patient then having myoclonic twitches and breifly confused appearing with near syncope when they occur approximately once every few minutes, EKG with episode with NSR without ST changes otherwise, FSG WNL reassuringly, patient anxious overall with fatigue and lightheadedness today with otherwise ROS negative, NiHSS 0 on examination in triage, amenable to stat move to room, CTA r/o dissection with HR elevated and feeling so unwell, troponin with magnesium and TSH and electrolytes, they are amenable to plan to admit, will dw/ cardiology after imaging and IVF begun   1306 On cardiac monitor patient in and out of SVT episodes with repeat EKG in SVT for period with her feeling worsening lightheadedness when she goes into it, stat 5mg IV lopressor given with IVF, 2nd large bore IV in antecubital fossa, cardiology paged re; recurrent frequent episodes paroxysmal svt   1315 I spoke with Dr Ozuna from cardiology who recommends serial " 5mg IV metoprolol if BP tolerates, begin metoprolol gtt, will likely  need ablation after reviewing EKG, see if [facility] bed available otherwise admit here and cardiology team will see her here today   1316 Charge RN seeking bed availability at [facility] for potential transfer to EP center, CTA pending, will begin metoprolol gtt   1324 Esmolol gtt begun with no metoprolol gtt available, per SOC no beds available at [facility] or anywhere in system, patient wtih fewer SVT episodes after initial metoprolol 5mg IV, patient made NPO, charge RN seeking bed availability now, will need ICU bed per nursing team for esmolol gtt monitoring. ICU paged to discuss case along with hospitalist   1325 Nursing supervisor notes we have room 310 an ICU bed available for patient and they are cleaning the room now, ICU and hospitalist paged for admission   1344 I spoke with Dr Barros from ICU who recommends hospitalist manage and accepts patient to ICU for admission and hospitalist team to f/u CTA and paitent presumably to transfer to [facility] when EP procedure available, then back here. I spoke with hospitalist Yossi and patient admitted to him and he will f/u CTA r/o dissection         FINAL IMPRESSION    1. SVT (supraventricular tachycardia) (H)    2. Syncope, unspecified syncope type        ED MEDS  Medications   esmolol 20 mg/mL (BREVIBLOC) infusion 100 mL (50 mcg/kg/min × 68 kg Intravenous $New Bag 3/13/23 1335)   acetaminophen (TYLENOL) tablet 500-1,000 mg (has no administration in time range)   benzonatate (TESSALON) capsule 200 mg (has no administration in time range)   fluticasone (FLONASE) 50 MCG/ACT spray 1 spray (has no administration in time range)   melatonin half-tab 0.5 mg (has no administration in time range)   omeprazole (priLOSEC) CR capsule 40 mg (has no administration in time range)   polyethylene glycol (MIRALAX) powder 17 g (has no administration in time range)   pravastatin (PRAVACHOL) tablet 10 mg (has  no administration in time range)   lidocaine 1 % 0.1-1 mL (has no administration in time range)   lidocaine (LMX4) cream (has no administration in time range)   sodium chloride (PF) 0.9% PF flush 3 mL (has no administration in time range)   sodium chloride (PF) 0.9% PF flush 3 mL (has no administration in time range)   melatonin tablet 1 mg (has no administration in time range)   sodium chloride 0.9% infusion (has no administration in time range)   ondansetron (ZOFRAN ODT) ODT tab 4 mg (has no administration in time range)     Or   ondansetron (ZOFRAN) injection 4 mg (has no administration in time range)   prochlorperazine (COMPAZINE) injection 5 mg (has no administration in time range)     Or   prochlorperazine (COMPAZINE) tablet 5 mg (has no administration in time range)     Or   prochlorperazine (COMPAZINE) suppository 12.5 mg (has no administration in time range)   metoprolol tartrate (LOPRESSOR) tablet 25 mg (has no administration in time range)   0.9% sodium chloride BOLUS (0 mLs Intravenous Stopped 3/13/23 1430)   metoprolol (LOPRESSOR) injection 5 mg (5 mg Intravenous $Given 3/13/23 1312)   iopamidol (ISOVUE-370) solution 100 mL (100 mLs Intravenous $Given 3/13/23 1413)       LAB  Labs Ordered and Resulted from Time of ED Arrival to Time of ED Departure   COMPREHENSIVE METABOLIC PANEL - Abnormal       Result Value    Sodium 138      Potassium 4.0      Chloride 104      Carbon Dioxide (CO2) 24      Anion Gap 10      Urea Nitrogen 15      Creatinine 0.65      Calcium 9.3      Glucose 93      Alkaline Phosphatase 125 (*)     AST 25      ALT 16      Protein Total 7.0      Albumin 3.7      Bilirubin Total 0.5      GFR Estimate 86     INR - Normal    INR 1.00     PARTIAL THROMBOPLASTIN TIME - Normal    aPTT 27     TROPONIN I - Normal    Troponin I <0.01     MAGNESIUM - Normal    Magnesium 1.8     TSH WITH FREE T4 REFLEX - Normal    TSH 2.20     INFLUENZA A/B, RSV, & SARS-COV2 PCR - Normal    Influenza A PCR  Negative      Influenza B PCR Negative      RSV PCR Negative      SARS CoV2 PCR Negative     GLUCOSE BY METER - Normal    GLUCOSE BY METER POCT 78     CBC WITH PLATELETS AND DIFFERENTIAL    WBC Count 10.2      RBC Count 4.19      Hemoglobin 12.7      Hematocrit 39.1      MCV 93      MCH 30.3      MCHC 32.5      RDW 13.9      Platelet Count 318      % Neutrophils 65      % Lymphocytes 24      % Monocytes 8      % Eosinophils 2      % Basophils 1      % Immature Granulocytes 0      NRBCs per 100 WBC 0      Absolute Neutrophils 6.6      Absolute Lymphocytes 2.4      Absolute Monocytes 0.8      Absolute Eosinophils 0.2      Absolute Basophils 0.1      Absolute Immature Granulocytes 0.0      Absolute NRBCs 0.0     TYPE AND SCREEN, ADULT    ABO/RH(D) O POS      Antibody Screen Negative      SPECIMEN EXPIRATION DATE [id number]     ABO/RH TYPE AND SCREEN       EKG  ***    RADIOLOGY    CTA Chest Abdomen Pelvis w Contrast    (Results Pending)   Echocardiogram Complete with Bubble Study    (Results Pending)   US Carotid Bilateral    (Results Pending)       DISCHARGE MEDS  New Prescriptions    No medications on file         Vasquez Marquez MD  Emergency Medicine  [facility] EMERGENCY ROOM  [address] [place] [id number] [phone]

## 2023-03-14 LAB
ANION GAP SERPL CALCULATED.3IONS-SCNC: 5 MMOL/L (ref 5–18)
BUN SERPL-MCNC: 15 MG/DL (ref 8–28)
CALCIUM SERPL-MCNC: 8.7 MG/DL (ref 8.5–10.5)
CHLORIDE BLD-SCNC: 108 MMOL/L (ref 98–107)
CO2 SERPL-SCNC: 22 MMOL/L (ref 22–31)
CREAT SERPL-MCNC: 0.6 MG/DL (ref 0.6–1.1)
ERYTHROCYTE [DISTWIDTH] IN BLOOD BY AUTOMATED COUNT: 14.2 % (ref 10–15)
GFR SERPL CREATININE-BSD FRML MDRD: 88 ML/MIN/1.73M2
GLUCOSE BLD-MCNC: 93 MG/DL (ref 70–125)
HCT VFR BLD AUTO: 36.6 % (ref 35–47)
HGB BLD-MCNC: 12 G/DL (ref 11.7–15.7)
MCH RBC QN AUTO: 30.4 PG (ref 26.5–33)
MCHC RBC AUTO-ENTMCNC: 32.8 G/DL (ref 31.5–36.5)
MCV RBC AUTO: 93 FL (ref 78–100)
PLATELET # BLD AUTO: 295 10E3/UL (ref 150–450)
POTASSIUM BLD-SCNC: 4 MMOL/L (ref 3.5–5)
RBC # BLD AUTO: 3.95 10E6/UL (ref 3.8–5.2)
SODIUM SERPL-SCNC: 135 MMOL/L (ref 136–145)
WBC # BLD AUTO: 6.5 10E3/UL (ref 4–11)

## 2023-03-14 PROCEDURE — 250N000013 HC RX MED GY IP 250 OP 250 PS 637: Performed by: INTERNAL MEDICINE

## 2023-03-14 PROCEDURE — 99233 SBSQ HOSP IP/OBS HIGH 50: CPT | Performed by: STUDENT IN AN ORGANIZED HEALTH CARE EDUCATION/TRAINING PROGRAM

## 2023-03-14 PROCEDURE — 36415 COLL VENOUS BLD VENIPUNCTURE: CPT | Performed by: STUDENT IN AN ORGANIZED HEALTH CARE EDUCATION/TRAINING PROGRAM

## 2023-03-14 PROCEDURE — 99232 SBSQ HOSP IP/OBS MODERATE 35: CPT | Performed by: INTERNAL MEDICINE

## 2023-03-14 PROCEDURE — 250N000013 HC RX MED GY IP 250 OP 250 PS 637: Performed by: STUDENT IN AN ORGANIZED HEALTH CARE EDUCATION/TRAINING PROGRAM

## 2023-03-14 PROCEDURE — 80048 BASIC METABOLIC PNL TOTAL CA: CPT | Performed by: STUDENT IN AN ORGANIZED HEALTH CARE EDUCATION/TRAINING PROGRAM

## 2023-03-14 PROCEDURE — 85027 COMPLETE CBC AUTOMATED: CPT | Performed by: STUDENT IN AN ORGANIZED HEALTH CARE EDUCATION/TRAINING PROGRAM

## 2023-03-14 PROCEDURE — 210N000002 HC R&B HEART CARE

## 2023-03-14 RX ORDER — QUETIAPINE FUMARATE 25 MG/1
25 TABLET, FILM COATED ORAL 3 TIMES DAILY PRN
Status: DISCONTINUED | OUTPATIENT
Start: 2023-03-14 | End: 2023-03-16 | Stop reason: HOSPADM

## 2023-03-14 RX ORDER — MECLIZINE HCL 12.5 MG 12.5 MG/1
12.5 TABLET ORAL 3 TIMES DAILY PRN
Status: DISCONTINUED | OUTPATIENT
Start: 2023-03-14 | End: 2023-03-16 | Stop reason: HOSPADM

## 2023-03-14 RX ORDER — HYDROXYZINE HYDROCHLORIDE 25 MG/1
25 TABLET, FILM COATED ORAL EVERY 6 HOURS PRN
Status: DISCONTINUED | OUTPATIENT
Start: 2023-03-14 | End: 2023-03-16 | Stop reason: HOSPADM

## 2023-03-14 RX ORDER — SODIUM CHLORIDE 9 MG/ML
INJECTION, SOLUTION INTRAVENOUS CONTINUOUS
Status: ACTIVE | OUTPATIENT
Start: 2023-03-14 | End: 2023-03-14

## 2023-03-14 RX ADMIN — OMEPRAZOLE 40 MG: 20 CAPSULE, DELAYED RELEASE ORAL at 07:52

## 2023-03-14 RX ADMIN — ACETAMINOPHEN 1000 MG: 500 TABLET ORAL at 22:37

## 2023-03-14 RX ADMIN — METOPROLOL TARTRATE 25 MG: 25 TABLET, FILM COATED ORAL at 07:53

## 2023-03-14 RX ADMIN — PRAVASTATIN SODIUM 10 MG: 10 TABLET ORAL at 22:31

## 2023-03-14 RX ADMIN — METOPROLOL TARTRATE 25 MG: 25 TABLET, FILM COATED ORAL at 20:04

## 2023-03-14 ASSESSMENT — ACTIVITIES OF DAILY LIVING (ADL)
ADLS_ACUITY_SCORE: 22
ADLS_ACUITY_SCORE: 23
ADLS_ACUITY_SCORE: 23
ADLS_ACUITY_SCORE: 24
ADLS_ACUITY_SCORE: 24
ADLS_ACUITY_SCORE: 23
ADLS_ACUITY_SCORE: 22
ADLS_ACUITY_SCORE: 23
ADLS_ACUITY_SCORE: 23
ADLS_ACUITY_SCORE: 22

## 2023-03-14 NOTE — UTILIZATION REVIEW
Admission Status; Secondary Review Determination   Under the authority of the Utilization Management Committee, the utilization review process indicated a secondary review on Anabelle Cameron. The review outcome is based on review of the medical records, discussions with staff, and applying clinical experience noted on the date of the review.   (x) Inpatient Status Appropriate - This patient's medical care is consistent with medical management for inpatient care and reasonable inpatient medical practice.     RATIONALE FOR DETERMINATION   Anabelle Cameron is an 82 yr old female with hx HTN, HLD, SVT.  Presented with SVT and syncope.  Planning ablation procedure at different facility.  Initially on esmolol gtt and was attempting to transition to metoprolol to do ablation outpatient however now with symptomatic lightheadedness since starting metoprolol this AM.  Will be crossing 2nd night to monitor for further symptomatic SVT or syncope with medication changes.    At the time of admission with the information available to the attending physician more than 2 nights Hospital complex care was anticipated, based on patient risk of adverse outcome if treated as outpatient and complex care required. Inpatient admission is appropriate based on the Medicare guidelines.   The information on this document is developed by the utilization review team in order for the business office to ensure compliance. This only denotes the appropriateness of proper admission status and does not reflect the quality of care rendered.   The definitions of Inpatient Status and Observation Status used in making the determination above are those provided in the CMS Coverage Manual, Chapter 1 and Chapter 6, section 70.4.   Sincerely,   Iris Vasquez MD  Utilization Review  Physician Advisor  [facility]

## 2023-03-14 NOTE — PROGRESS NOTES
Patient did not have any further tachycardia overnight.  She feels well this morning.  She continues to be on an esmolol drip.    Vital signs are reviewed.  Blood pressure little high this morning.    Chest: Clear to auscultation    Cardiovascular: S1-S2 without murmur clicks or rubs    Echocardiogram showed borderline low left ventricular systolic function which is not surprising given her recent multiple episodes of tachycardia.    We will convert her to metoprolol 25 mg twice daily at this morning.  30 minutes after taking the first dose of metoprolol, we will wean off the esmolol.  If she remains stable without tachycardia I think she can go home after lunch today.    I will message our electrophysiology colleagues to see if we can get her in for a low EP study and probable ablation in the near future.  She is more than willing to pursue this strategy after recent events.    Thank you for allowing us to participate in her care.

## 2023-03-14 NOTE — PROGRESS NOTES
[facility]    Medicine Progress Note - Hospitalist Service    Date of Admission:  3/13/2023    Assessment & Plan      Anabelle Cameron is a 82 year old female admitted on 3/13/2023. She has a past medical history of hypertension, hyperlipidemia, reflux, depression, constipation, and previously established and known paroxysmal SVT.  She saw Dr. Hanley as an outpatient and in February had symptomatic SVT and was offered an ablation which she declined.  However, secondary to logistical issues, the patient not continued taking a beta-blocker; on day of admission now presenting with symptomatic SVT and syncope, in ER with on and off SVT; given metoprolol and started on esmolol drip and now rate controlled.      Plan for admission with cardiology consult and eventual transfer to [facility] for ablation.  CT did not show PE or dissection.  As part of syncope work-up, carotid ultrasound bilaterally obtained and nonacute, and echo with EF 50-55%.  Remains hemodynamically and vitally stable.  Cardiology potentially transitioning to p.o. regimen and possibly transfer sometime if she remains stable. Mild hyponatremia 135.    Cardiology has evaluated early morning and transitioned her to metoprolol 25 mg twice daily and eventual outpatient ablation. However, later this morning, the patient is having recurrent lightheadedness which she reports started after taking the metoprolol.  Given she is no longer on the esmolol drip, will transfer out of the ICU to cardiac telemetry, with ongoing monitoring and FYI page to the cardiology team.  Resuming maintenance fluids.  As needed meclizine added.     Initial goal was possible discharge in the afternoon, but given the aforementioned lightheadedness, more likely dispo for tomorrow, pending further medication adjustments from cardiology    ----------------------------  Principal Problem:  - new lightheadedness after starting metoprolol    Syncope, unspecified  syncope type    SVT (supraventricular tachycardia) (H)    Assessment: Directly as above.  Appreciate cardiology help.    Plan:  -Consult cardiology    - as above, now on metoprolol   -Her recurrent lightheadedness after starting metoprolol questions if she may be having a medication reaction and/or concern regarding ability to tolerate transition-asked the [facility] to send a page to cardiology FYI  -ablation would be as an outpatient eventually  -Fall precautions  -Echo and ultrasound carotids obtained as above  -Orthostatics  -Monitor clinically  -Gentle maintenance fluids -50 ccs/hr ordered for 10 hours - stopped early morning as scheduled   - given recurrent lightheadedness, RESUME gentle MIVFs today   - START Meclizine PRN  - considering PT eval for abelardo-hallpike if lightheadedness and dizziness does not improve  - can TRANSFER out of ICU to cardiac telemetry       GERD (gastroesophageal reflux disease)    Assessment: On Prilosec 40 mg once daily.  Stable.    Plan:  -Continue    Mild hyponatremia 135.  A/p - follow am lab, could be volume-related and po related.       Mixed hyperlipidemia    Assessment: On pravastatin.    Plan:  -Continue      Moderate major depression (H)    Assessment: Stable, not currently on medication.    Plan:  -Monitor clinically    Constipation  Assessment: stable, on regimen of daily miralax.  Plan:  - continue          Diet: Combination Diet Low Saturated Fat Na <2400mg Diet, No Caffeine Diet    DVT Prophylaxis: Pneumatic Compression Devices; may have procedure/ablation  Guillory Catheter: Not present  Lines: None     Cardiac Monitoring: None  Code Status: Full Code      Clinically Significant Risk Factors Present on Admission                     # Acute Respiratory Failure: Documented O2 saturation < 91%.  Continue supplemental oxygen as needed             Disposition Plan     Expected Discharge Date: 03/15/2023      Destination: home with family            Chavo Mccullough MD  Hospitalist  Service  [facility]  Securely message with [employer] (more info)  Text page via mobintent Paging/Directory   ______________________________________________________________________    Interval History   --no acute interval events  -seen in early morning, no new symptoms or shortness of breath.  -We discussed disposition and cardiology titration to likely po medication  -All questions answered      Physical Exam   Vital Signs: Temp: 98.1  F (36.7  C) Temp src: Oral BP: 134/70 Pulse: 66   Resp: 13 SpO2: 95 % O2 Device: None (Room air)    Weight: 152 lbs 4.8 oz    General: alert, oriented, and mildly distressed- flushed and feels lightheaded  HENT- nose and cheeks are flushed, distribution not quite consistent with a rosacea and per pt this started right after taking metoprolol   Pulmonary: clear to auscultation bilaterally, normal respiratory effort, on room air, no rales or wheezes or evidence of accessory muscle use  CVS: regular rate and rhythm, no murmurs, rubs, or gallops; no blatant jugular venous distention; mild nonpitting lower leg extremity edema and extremities remain warm to the touch  GI: soft, nontender, BS+, no rebound or guarding, no conspicuous organomegaly   Neuro: nonfocal, moves all extremities of own volition  Psych: appropriate    Medical Decision Making       55 MINUTES SPENT BY ME on the date of service doing chart review, history, exam, documentation & further activities per the note.      Data     I have personally reviewed the following data over the past 24 hrs:    6.5  \   12.0   / 295     135 (L) 108 (H) 15 /  93   4.0 22 0.60 \       ALT: 16 AST: 25 AP: 125 (H) TBILI: 0.5   ALB: 3.7 TOT PROTEIN: 7.0 LIPASE: N/A       TSH: 2.20 T4: N/A A1C: N/A       INR:  1.00 PTT:  27   D-dimer:  N/A Fibrinogen:  N/A       Imaging results reviewed over the past 24 hrs:   Recent Results (from the past 24 hour(s))   CTA Chest Abdomen Pelvis w Contrast    Narrative    EXAM: CTA CHEST  ABDOMEN PELVIS W CONTRAST  LOCATION: [facility]  DATE/TIME: 3/13/2023 2:29 PM    INDICATION: arrhythmia, unsteady on feet  COMPARISON: CT pulmonary angiogram 01/01/2023  TECHNIQUE: CT angiogram chest abdomen pelvis during arterial phase of injection of IV contrast. 2D and 3D MIP reconstructions were performed by the CT technologist. Dose reduction techniques were used.   CONTRAST: Isovue 370 100mL    FINDINGS:   CT ANGIOGRAM CHEST, ABDOMEN, AND PELVIS: Pulsation artifact at the aortic root. No thoracic aortic aneurysm. 2 vessel arch anatomy. Mild mixed attenuation atheroma in the distal arch and descending thoracic aorta. Main pulmonary artery is normal caliber.   There are no pulmonary artery filling defects.    Mild mixed attenuation atheroma in the normal caliber abdominal aorta. Iliac and common femoral arteries are widely patent. Mesenteric and renal arteries are normal.    LUNGS AND PLEURA: Unchanged subsegmental sized opacity in the lateral segment right middle lobe abutting the anterolateral costal pleura consistent with focal atelectasis. Limited territories of subpleural groundglass are present in the posterior lower   lobes consistent with positional atelectasis. There is a pneumatocele in the medial right lower lobe. No new airspace opacities or lung nodules. Central airways are patent. No pleural effusion.    MEDIASTINUM: Cardiac chambers are normal in size. No pericardial effusion. Mild mitral annular calcifications. Thyroid gland is mildly heterogeneous. No enlarged mediastinal or hilar lymph nodes. A small hiatal hernia.    CORONARY ARTERY CALCIFICATION: Mild.    HEPATOBILIARY: Normal.    PANCREAS: Normal.    SPLEEN: Normal.    ADRENAL GLANDS: Normal.    KIDNEYS/BLADDER: Kidneys have normal size, cortex thickness and symmetric parenchymal enhancement. Prominent extrarenal pelves. No urinary tract calculi. Urinary bladder is normal.    BOWEL: Nondistended stomach. Normal  caliber small bowel. Diverticula arise from the sigmoid colon. No bowel wall thickening or abnormal mucosal enhancement. No ascites.    LYMPH NODES: Normal.    PELVIC ORGANS: Uterus is absent. Normal ovaries.    MUSCULOSKELETAL: Status post L4-S1 posterior decompression and fixation. Generalized demineralization of the bones. No aggressive or destructive bone lesions. Mild reciprocal thoracolumbar spinal curvature.      Impression    IMPRESSION:    1.  No acute pulmonary embolism or acute aortic syndrome.  2.  Small hiatal hernia.  3.  Mild sigmoid diverticulosis.     Echocardiogram Complete with Bubble Study   Result Value    LVEF  50-55%    [facility]    [id number]  [id number]  [id number]  [id number]^MURIEL^ORLANDO^[place] [address]     Name: SHI LERMA  MRN: [id number]  : 1940  Study Date: 2023 02:57 PM  Age: 82 yrs  Gender: Female  Patient Location: [facility]  Reason For Study: Syncope  Ordering Physician: ORLANDO LLAMAS  Performed By: BARBARA/CONSUELO     BSA: 1.8 m2  Height: 66 in  Weight: 150 lb  HR: 79  BP: 128/95 mmHg  ______________________________________________________________________________  Procedure  Complete Portable Bubble Echo Adult.  ______________________________________________________________________________  Interpretation Summary     The right ventricle is normal size.  The right ventricular systolic function is normal.  The left ventricle is normal in size.  The visual ejection fraction is 50-55%.  No regional wall motion abnormalities noted.  IVC diameter <2.1 cm collapsing >50% with sniff suggests a normal RA pressure  of 3 mmHg.  Sinus rhythm was noted.  No hemodynamically significant valvular abnormalities on 2D or color flow  imaging.  ______________________________________________________________________________  Left Ventricle  The left ventricle is normal in size. Proximal septal thickening is noted.  Diastolic Doppler findings (E/E' ratio and/or  other parameters) suggest left  ventricular filling pressures are indeterminate. The visual ejection fraction  is 50-55%. No regional wall motion abnormalities noted.     Right Ventricle  The right ventricle is normal size. TAPSE is normal, which is consistent with  normal right ventricular systolic function. The right ventricular systolic  function is normal.     Atria  The left atrium is mildly dilated. Right atrial size is normal. A contrast  injection (Bubble Study) was performed that was negative for flow across the  interatrial septum.     Mitral Valve  The mitral valve leaflets appear thickened, but open well. There is no mitral  regurgitation noted. There is no mitral valve stenosis.     Tricuspid Valve  Tricuspid valve leaflets appear normal. There is mild (1+) tricuspid  regurgitation. There is no tricuspid stenosis.     Aortic Valve  Mild aortic valve calcification is present. The aortic valve is trileaflet. No  aortic regurgitation is present. No aortic stenosis is present.     Pulmonic Valve  The pulmonic valve is not well visualized. There is no pulmonic valvular  regurgitation. There is no pulmonic valvular stenosis.     Vessels  The aorta root is normal. Normal size ascending aorta. IVC diameter <2.1 cm  collapsing >50% with sniff suggests a normal RA pressure of 3 mmHg.     Pericardium  There is no pericardial effusion.     Rhythm  Sinus rhythm was noted.  ______________________________________________________________________________  MMode/2D Measurements & Calculations     IVSd: 1.3 cm  LVIDd: 3.2 cm  LVIDs: 2.3 cm  LVPWd: 0.88 cm  FS: 28.5 %  LV mass(C)d: 101.5 grams  LV mass(C)dI: 57.4 grams/m2  Ao root diam: 3.2 cm  LA dimension: 3.0 cm  asc Aorta Diam: 3.2 cm  LA/Ao: 0.93  LVOT diam: 2.1 cm  LVOT area: 3.5 cm2  LA Volume Indexed (AL/bp): 32.3 ml/m2  RWT: 0.55  TAPSE: 2.1 cm     Time Measurements  MM HR: 79.0 BPM     Doppler Measurements & Calculations  MV E max jax: 53.9 cm/sec  MV A max jax:  124.9 cm/sec  MV E/A: 0.43  MV max P.7 mmHg  MV mean P.3 mmHg  MV V2 VTI: 18.2 cm  MVA(VTI): 2.7 cm2  Ao V2 max: 84.7 cm/sec  Ao max PG: 3.0 mmHg  Ao V2 mean: 56.3 cm/sec  Ao mean P.5 mmHg  Ao V2 VTI: 16.2 cm  DAKOTA(I,D): 3.1 cm2  DAKOTA(V,D): 3.2 cm2  LV V1 max P.4 mmHg  LV V1 max: 76.7 cm/sec  LV V1 VTI: 14.1 cm  SV(LVOT): 49.7 ml  SI(LVOT): 28.1 ml/m2  PA acc time: 0.11 sec  AV Pj Ratio (DI): 0.91  DAKOTA Index (cm2/m2): 1.7  E/E': 14.2  E/E' avg: 15.2  Lateral E/e': 14.2     Medial E/e': 16.3  Peak E' Pj: 3.8 cm/sec  RV S Pj: 9.6 cm/sec     ______________________________________________________________________________  Report approved by: Huma Post 2023 04:17 PM         US Carotid Bilateral    Narrative    EXAM: US CAROTID BILATERAL  LOCATION: [facility]  DATE/TIME: 3/13/2023 5:24 PM    INDICATION: syncope  COMPARISON: None.  TECHNIQUE: Duplex exam performed utilizing 2D gray-scale imaging, Doppler interrogation with color-flow and spectral waveform analysis. The percent diameter stenosis is determined using NASCET criteria and Society of Radiologists in Ultrasound Consensus   Criteria.    FINDINGS:    RIGHT: Mild plaque at the bifurcation. The peak systolic velocity in the ICA is less than 125 cm/sec, consistent with less than 50% stenosis. Normal velocities in the ECA. Antegrade flow within the vertebral artery.     LEFT: Mild plaque at the bifurcation. The peak systolic velocity in the ICA is less than 125 cm/sec, consistent with less than 50% stenosis. Normal velocities in the ECA. Antegrade flow within the vertebral artery.    VELOCITY CHART:  CCA   Right: 73/17 cm/s   Left: 61/18 cm/s  ICA   Right: 100/28 cm/s   Left: 106/31 cm/s  ECA   Right: 57/6 cm/s   Left: 61/13 cm/s  ICA/CCA PSV Ratio   Right: 1.4   Left: 1.7      Impression    IMPRESSION:  1.  Mild plaque formation, velocities consistent with less than 50% stenosis in the right internal carotid  artery.  2.  Mild plaque formation, velocities consistent with less than 50% stenosis in the left internal carotid artery.  3.  Flow within the vertebral arteries is antegrade.

## 2023-03-14 NOTE — PLAN OF CARE
Goal Outcome Evaluation:      Plan of Care Reviewed With: patient    Overall Patient Progress: improvingOverall Patient Progress: improving    Outcome Evaluation: Pt maintained SR throughout shift without increased needs for esmolol gtt. Scheduled to begin po beta-blocker today. C/O chronic neck pain, given PRN APAP and warm pack with good effect. No further complaints.  Slept well through the noc.      Problem: Plan of Care - These are the overarching goals to be used throughout the patient stay.    Goal: Optimal Comfort and Wellbeing  Outcome: Progressing  Intervention: Monitor Pain and Promote Comfort  Recent Flowsheet Documentation  Taken 3/13/2023 2000 by Beth Doan, RN  Pain Management Interventions:   medication (see MAR)   heat applied  Intervention: Provide Person-Centered Care  Recent Flowsheet Documentation  Taken 3/14/2023 0400 by Beth Doan, RN  Trust Relationship/Rapport:   care explained   choices provided   emotional support provided   empathic listening provided   questions answered   questions encouraged   reassurance provided   thoughts/feelings acknowledged  Taken 3/14/2023 0200 by Beth Doan RN  Trust Relationship/Rapport:   care explained   choices provided   emotional support provided   empathic listening provided   questions answered   questions encouraged   reassurance provided   thoughts/feelings acknowledged  Taken 3/14/2023 0000 by Beth Doan, RN  Trust Relationship/Rapport:   care explained   choices provided   emotional support provided   empathic listening provided   questions answered   questions encouraged   reassurance provided   thoughts/feelings acknowledged  Taken 3/13/2023 2200 by Beth Doan, RN  Trust Relationship/Rapport:   care explained   choices provided   emotional support provided   empathic listening provided   questions answered   questions encouraged   reassurance provided   thoughts/feelings acknowledged  Taken 3/13/2023 2000 by  Beth Doan RN  Trust Relationship/Rapport:   care explained   choices provided   emotional support provided   empathic listening provided   questions answered   questions encouraged   reassurance provided   thoughts/feelings acknowledged     Problem: Plan of Care - These are the overarching goals to be used throughout the patient stay.    Goal: Readiness for Transition of Care  Outcome: Progressing     Problem: Risk for Delirium  Goal: Improved Sleep  Outcome: Progressing     Problem: Dysrhythmia  Goal: Normalized Cardiac Rhythm  Outcome: Progressing

## 2023-03-15 ENCOUNTER — ANESTHESIA EVENT (OUTPATIENT)
Dept: CARDIOLOGY | Facility: HOSPITAL | Age: 83
DRG: 274 | End: 2023-03-15
Payer: COMMERCIAL

## 2023-03-15 LAB
CREAT SERPL-MCNC: 0.65 MG/DL (ref 0.6–1.1)
GFR SERPL CREATININE-BSD FRML MDRD: 86 ML/MIN/1.73M2
SODIUM SERPL-SCNC: 133 MMOL/L (ref 136–145)

## 2023-03-15 PROCEDURE — 250N000013 HC RX MED GY IP 250 OP 250 PS 637: Performed by: INTERNAL MEDICINE

## 2023-03-15 PROCEDURE — 99232 SBSQ HOSP IP/OBS MODERATE 35: CPT | Performed by: STUDENT IN AN ORGANIZED HEALTH CARE EDUCATION/TRAINING PROGRAM

## 2023-03-15 PROCEDURE — 36415 COLL VENOUS BLD VENIPUNCTURE: CPT | Performed by: STUDENT IN AN ORGANIZED HEALTH CARE EDUCATION/TRAINING PROGRAM

## 2023-03-15 PROCEDURE — 84295 ASSAY OF SERUM SODIUM: CPT | Performed by: STUDENT IN AN ORGANIZED HEALTH CARE EDUCATION/TRAINING PROGRAM

## 2023-03-15 PROCEDURE — 99233 SBSQ HOSP IP/OBS HIGH 50: CPT | Performed by: INTERNAL MEDICINE

## 2023-03-15 PROCEDURE — 82565 ASSAY OF CREATININE: CPT | Performed by: STUDENT IN AN ORGANIZED HEALTH CARE EDUCATION/TRAINING PROGRAM

## 2023-03-15 PROCEDURE — 250N000013 HC RX MED GY IP 250 OP 250 PS 637: Performed by: STUDENT IN AN ORGANIZED HEALTH CARE EDUCATION/TRAINING PROGRAM

## 2023-03-15 PROCEDURE — 210N000002 HC R&B HEART CARE

## 2023-03-15 RX ORDER — SODIUM CHLORIDE 9 MG/ML
100 INJECTION, SOLUTION INTRAVENOUS CONTINUOUS
Status: CANCELLED | OUTPATIENT
Start: 2023-03-16

## 2023-03-15 RX ORDER — FENTANYL CITRATE 50 UG/ML
25 INJECTION, SOLUTION INTRAMUSCULAR; INTRAVENOUS
Status: CANCELLED | OUTPATIENT
Start: 2023-03-16

## 2023-03-15 RX ORDER — DEXMEDETOMIDINE HYDROCHLORIDE 4 UG/ML
.1-1.5 INJECTION, SOLUTION INTRAVENOUS CONTINUOUS
Status: CANCELLED | OUTPATIENT
Start: 2023-03-16

## 2023-03-15 RX ORDER — LIDOCAINE 40 MG/G
CREAM TOPICAL
Status: CANCELLED | OUTPATIENT
Start: 2023-03-16

## 2023-03-15 RX ADMIN — METOPROLOL TARTRATE 25 MG: 25 TABLET, FILM COATED ORAL at 19:35

## 2023-03-15 RX ADMIN — METOPROLOL TARTRATE 25 MG: 25 TABLET, FILM COATED ORAL at 07:39

## 2023-03-15 RX ADMIN — ACETAMINOPHEN 1000 MG: 500 TABLET ORAL at 19:35

## 2023-03-15 RX ADMIN — OMEPRAZOLE 40 MG: 20 CAPSULE, DELAYED RELEASE ORAL at 07:39

## 2023-03-15 RX ADMIN — PRAVASTATIN SODIUM 10 MG: 10 TABLET ORAL at 19:36

## 2023-03-15 ASSESSMENT — ACTIVITIES OF DAILY LIVING (ADL)
ADLS_ACUITY_SCORE: 24

## 2023-03-15 NOTE — PLAN OF CARE
Problem: Dysrhythmia  Goal: Normalized Cardiac Rhythm  Outcome: Progressing     Problem: Fatigue  Goal: Improved Activity Tolerance  Outcome: Progressing    Patient Aox4, pleasant, cooperative. Anxious at times. Denies pain. SBA w/w and gb. Vitally stable on room air. Cardiac procedure set for tomorrow at [facility]. Ride time set for 0930.

## 2023-03-15 NOTE — H&P (VIEW-ONLY)
"  Cardiology Progress Note    Assessment:  1.  Supraventricular tachycardia.  She has recently been seen by EP colleague Dr. Hanley  2023 with wide-complex tachycardia associated with palpitations noted for the first time November 2022 and suspected SVT with aberrancy at that time.  Cardiac MRI January 2022 reported normal ejection fraction without fibrosis infiltrative ischemia.  She has had a history of syncope that was felt to be vagally mediated.  ER admit note reviewed.  She did have syncope associated with the SVT.  I described the ablation procedure in detail with her today.  Principal Problem:    Syncope, unspecified syncope type  Active Problems:    GERD (gastroesophageal reflux disease)    Mixed hyperlipidemia    Moderate major depression (H)    SVT (supraventricular tachycardia) (H)      Plan:  1.  I did place a call to EP to discuss plan further. Dr Cobian indicated that he could put her on the schedule for tomorrow.  I reviewed this with her and she is undecided.  She will speak with her  and let us know what her decision is.  I did inform her that my concern was that if she has additional SVT and syncopal episodes that she could hurt herself.    Appreciate discussion with , she will transfer to [facility] for the procedure tomorrow.  Questions answered.    Subjective:   Anabelle Cameron is seen in follow-up.  First visit for this examiner.  Chart record reviewed.  Episodes of SVT.  Reportedly not taking her beta-blocker since her visit in February.  She initially declined ablation procedure the day of admission she had recurrent syncope.  She had symptoms of room spinning and lightheadedness as well as palpitations.  She denied chest discomfort.    Objective:   Vital signs in last 24 hours:  VITALS: BP (!) 165/77 (BP Location: Right arm)   Pulse 67   Temp 97.9  F (36.6  C) (Oral)   Resp 18   Ht 1.676 m (5' 6\")   Wt 69.3 kg (152 lb 12.8 oz)   SpO2 97%   BMI 24.66 kg/m    BMI: " Body mass index is 24.66 kg/m .  Wt Readings from Last 3 Encounters:   03/15/23 69.3 kg (152 lb 12.8 oz)   23 66.2 kg (146 lb)   23 72 kg (158 lb 11.7 oz)       Intake/Output Summary (Last 24 hours) at 3/15/2023 0942  Last data filed at 3/15/2023 0638  Gross per 24 hour   Intake 960 ml   Output 1400 ml   Net -440 ml       Physical Exam:  General: Resting comfortably in bed in no acute distress   Neck: No JVD   Lungs: clear to auscultation   COR: RRR, No significant murmur   Abd: nondistended, BS present   Extrem: No edema, warm  Neuro: No focal deficits.    Cardiographics:    ECG: 3/13 SVT with rapid ventricular response.  Echocardiogram:   Echocardiogram Complete with Bubble Study  Order: [id number]  Status: Edited Result - FINAL     Visible to patient: Yes (not seen)     Next appt: None     0 Result Notes  Details    Reading Physician Reading Date Result Priority   Qamar Ozuna MD  [phone] 3/13/2023      Narrative & Impression  [id number]  [id number]  [id number]^MURIEL^[place] [address]     Name: SHI LERMA  MRN: [id number]  : 1940  Study Date: 2023 02:57 PM  Age: 82 yrs  Gender: Female  Patient Location: [facility]  Reason For Study: Syncope  Ordering Physician: ORLANDO LLAMAS  Performed By: BARBARA/CONSUELO     BSA: 1.8 m2  Height: 66 in  Weight: 150 lb  HR: 79  BP: 128/95 mmHg  ______________________________________________________________________________  Procedure  Complete Portable Bubble Echo Adult.  ______________________________________________________________________________  Interpretation Summary     The right ventricle is normal size.  The right ventricular systolic function is normal.  The left ventricle is normal in size.  The visual ejection fraction is 50-55%.  No regional wall motion abnormalities noted.  IVC diameter <2.1 cm collapsing >50% with sniff suggests a normal RA pressure  of 3 mmHg.  Sinus rhythm was  noted.  No hemodynamically significant valvular abnormalities on 2D or color flow  imaging.  ______________________________________________________________________________  Left Ventricle  The left ventricle is normal in size. Proximal septal thickening is noted.  Diastolic Doppler findings (E/E' ratio and/or other parameters) suggest left  ventricular filling pressures are indeterminate. The visual ejection fraction  is 50-55%. No regional wall motion abnormalities noted.     Right Ventricle  The right ventricle is normal size. TAPSE is normal, which is consistent with  normal right ventricular systolic function. The right ventricular systolic  function is normal.     Atria  The left atrium is mildly dilated. Right atrial size is normal. A contrast  injection (Bubble Study) was performed that was negative for flow across the  interatrial septum.     Mitral Valve  The mitral valve leaflets appear thickened, but open well. There is no mitral  regurgitation noted. There is no mitral valve stenosis.     Tricuspid Valve  Tricuspid valve leaflets appear normal. There is mild (1+) tricuspid  regurgitation. There is no tricuspid stenosis.     Aortic Valve  Mild aortic valve calcification is present. The aortic valve is trileaflet. No  aortic regurgitation is present. No aortic stenosis is present.     Pulmonic Valve  The pulmonic valve is not well visualized. There is no pulmonic valvular  regurgitation. There is no pulmonic valvular stenosis.     Vessels  The aorta root is normal. Normal size ascending aorta. IVC diameter <2.1 cm  collapsing >50% with sniff suggests a normal RA pressure of 3 mmHg.     Pericardium  There is no pericardial effusion.     Rhythm  Sinus rhythm was noted.           Telemetry: Sinus rhythm, no additional SVT.    Imaging:   Chest X-Ray:  EXAM: US CAROTID BILATERAL  LOCATION: [facility]  DATE/TIME: 3/13/2023 5:24 PM     INDICATION: syncope  COMPARISON: None.  TECHNIQUE:  Duplex exam performed utilizing 2D gray-scale imaging, Doppler interrogation with color-flow and spectral waveform analysis. The percent diameter stenosis is determined using NASCET criteria and Society of Radiologists in Ultrasound Consensus   Criteria.     FINDINGS:     RIGHT: Mild plaque at the bifurcation. The peak systolic velocity in the ICA is less than 125 cm/sec, consistent with less than 50% stenosis. Normal velocities in the ECA. Antegrade flow within the vertebral artery.      LEFT: Mild plaque at the bifurcation. The peak systolic velocity in the ICA is less than 125 cm/sec, consistent with less than 50% stenosis. Normal velocities in the ECA. Antegrade flow within the vertebral artery.     VELOCITY CHART:  CCA   Right: 73/17 cm/s   Left: 61/18 cm/s  ICA   Right: 100/28 cm/s   Left: 106/31 cm/s  ECA   Right: 57/6 cm/s   Left: 61/13 cm/s  ICA/CCA PSV Ratio   Right: 1.4   Left: 1.7                                                                      IMPRESSION:  1.  Mild plaque formation, velocities consistent with less than 50% stenosis in the right internal carotid artery.  2.  Mild plaque formation, velocities consistent with less than 50% stenosis in the left internal carotid artery.  3.  Flow within the vertebral arteries is antegrade    EXAM: CTA CHEST ABDOMEN PELVIS W CONTRAST  LOCATION: [facility]  DATE/TIME: 3/13/2023 2:29 PM     INDICATION: arrhythmia, unsteady on feet  COMPARISON: CT pulmonary angiogram 01/01/2023  TECHNIQUE: CT angiogram chest abdomen pelvis during arterial phase of injection of IV contrast. 2D and 3D MIP reconstructions were performed by the CT technologist. Dose reduction techniques were used.   CONTRAST: Isovue 370 100mL     FINDINGS:   CT ANGIOGRAM CHEST, ABDOMEN, AND PELVIS: Pulsation artifact at the aortic root. No thoracic aortic aneurysm. 2 vessel arch anatomy. Mild mixed attenuation atheroma in the distal arch and descending thoracic aorta.  Main pulmonary artery is normal caliber.   There are no pulmonary artery filling defects.     Mild mixed attenuation atheroma in the normal caliber abdominal aorta. Iliac and common femoral arteries are widely patent. Mesenteric and renal arteries are normal.     LUNGS AND PLEURA: Unchanged subsegmental sized opacity in the lateral segment right middle lobe abutting the anterolateral costal pleura consistent with focal atelectasis. Limited territories of subpleural groundglass are present in the posterior lower   lobes consistent with positional atelectasis. There is a pneumatocele in the medial right lower lobe. No new airspace opacities or lung nodules. Central airways are patent. No pleural effusion.     MEDIASTINUM: Cardiac chambers are normal in size. No pericardial effusion. Mild mitral annular calcifications. Thyroid gland is mildly heterogeneous. No enlarged mediastinal or hilar lymph nodes. A small hiatal hernia.     CORONARY ARTERY CALCIFICATION: Mild.     HEPATOBILIARY: Normal.     PANCREAS: Normal.     SPLEEN: Normal.     ADRENAL GLANDS: Normal.     KIDNEYS/BLADDER: Kidneys have normal size, cortex thickness and symmetric parenchymal enhancement. Prominent extrarenal pelves. No urinary tract calculi. Urinary bladder is normal.     BOWEL: Nondistended stomach. Normal caliber small bowel. Diverticula arise from the sigmoid colon. No bowel wall thickening or abnormal mucosal enhancement. No ascites.     LYMPH NODES: Normal.     PELVIC ORGANS: Uterus is absent. Normal ovaries.     MUSCULOSKELETAL: Status post L4-S1 posterior decompression and fixation. Generalized demineralization of the bones. No aggressive or destructive bone lesions. Mild reciprocal thoracolumbar spinal curvature.                                                                      IMPRESSION:     1.  No acute pulmonary embolism or acute aortic syndrome.  2.  Small hiatal hernia.  3.  Mild sigmoid diverticulosis.      MRN:                   [id number]                                  Name:            SHI LERMA                                  :                  1940                                  Scan Date:   2023 11:12:16                                   Electronically signed by Sony Morocho  14:07:43     VITALS   ==========================================================================================================     HEIGHT: 65.98 in    (167.60 cm)  WEIGHT: 158.73 lbs    (72.00 kgs)  BSA: 1.81 m^2     SUMMARY   ==========================================================================================================     1.  Pharmacological Regadenoson stress cardiac MRI is negative for inducible myocardial ischemia.   2.  No evidence of prior myocardial infarction, focal or diffuse nonvascular myocardial fibrosis, or  infiltrative disease.  3.  Normal left ventricular size, wall thickness and systolic function. The quantified left ventricular  ejection fraction is 64%.   4.  Normal right ventricular size and systolic function.  The quantified right ventricular ejection  fraction is 55%.  5.  No significant valvular abnormalities.   Lab Results    Chemistry/lipid CBC Cardiac Enzymes/BNP/TSH/INR   No results for input(s): CHOL, HDL, LDL, TRIG, CHOLHDLRATIO in the last 58926 hours.  No results for input(s): LDL in the last 34599 hours.  Recent Labs   Lab Test 03/15/23  0449 23  0445   * 135*   POTASSIUM  --  4.0   CHLORIDE  --  108*   CO2  --  22   GLC  --  93   BUN  --  15   CR 0.65 0.60   GFRESTIMATED 86 88   KINJAL  --  8.7     Recent Labs   Lab Test 03/15/23  0449 23  0445 23  1315   CR 0.65 0.60 0.65     No results for input(s): A1C in the last 28020 hours.       Recent Labs   Lab Test 23  0445   WBC 6.5   HGB 12.0   HCT 36.6   MCV 93        Recent Labs   Lab Test 23  0445 23  1315 23  1506   HGB 12.0 12.7 12.4    Recent Labs   Lab Test 23  1315  01/01/23  1506 06/21/22  2301   TROPONINI <0.01 <0.01 <0.01     Recent Labs   Lab Test 06/21/22 2001   BNP 44     Recent Labs   Lab Test 03/13/23  1315   TSH 2.20     Recent Labs   Lab Test 03/13/23  1315   INR 1.00

## 2023-03-15 NOTE — PROGRESS NOTES
[facility]    Medicine Progress Note - Hospitalist Service    Date of Admission:  3/13/2023    Assessment & Plan      Anabelle Cameron is a 82 year old female admitted on 3/13/2023. She has a past medical history of hypertension, hyperlipidemia, reflux, depression, constipation, and previously established and known paroxysmal SVT.  She saw Dr. Hanley as an outpatient and in February had symptomatic SVT and was offered an ablation which she declined.  However, secondary to logistical issues, the patient not continued taking a beta-blocker; on day of admission now presenting with symptomatic SVT and syncope, in ER with on and off SVT; given metoprolol and started on esmolol drip and now rate controlled.      Plan for admission with cardiology consult and eventual transfer to [facility] for ablation.  CT did not show PE or dissection.  As part of syncope work-up, carotid ultrasound bilaterally obtained and nonacute, and echo with EF 50-55%.  Remains hemodynamically and vitally stable.  Cardiology potentially transitioning to p.o. regimen and possibly transfer sometime if she remains stable. Mild hyponatremia 135.    Cardiology has evaluated early morning and transitioned her to metoprolol 25 mg twice daily and initially had some lightheadedness with starting the new medication. She now appears to tolerate the metoprolol and also with better PO intake.  We discussed possible discharge, but mid-morning cardiology offered possibly being able to arrange the ablation at [facility] tomorrow 3/16.  As such, we will continue to monitor her until arrangements are made. Likely transfer 3/16.    ----------------------------  Principal Problem:  - lightheadedness improved    Syncope, unspecified syncope type    SVT (supraventricular tachycardia) (H)    Assessment: Directly as above.  Appreciate cardiology help.    Plan:  -Consult cardiology    - as above, now on metoprolol 25mg po bid  -ablation being  arranged for tomorrow at [facility]  -Fall precautions  -Echo and ultrasound carotids obtained as above  -Orthostatics  -Monitor clinically  - Meclizine PRN  - considering PT eval for abelardo-hallpike if lightheadedness and dizziness does not improve  - tele      GERD (gastroesophageal reflux disease)    Assessment: On Prilosec 40 mg once daily.  Stable.    Plan:  -Continue    Mild hyponatremia   A/p - follow am lab, could be volume-related and po related. Stable.      Mixed hyperlipidemia    Assessment: On pravastatin.    Plan:  -Continue      Moderate major depression (H)    Assessment: Stable, not currently on medication.    Plan:  -Monitor clinically    Constipation  Assessment: stable, on regimen of daily miralax.  Plan:  - continue          Diet: Combination Diet Low Saturated Fat Na <2400mg Diet, No Caffeine Diet    DVT Prophylaxis: Pneumatic Compression Devices; may have procedure/ablation  Guillory Catheter: Not present  Lines: None     Cardiac Monitoring: None  Code Status: Full Code      Clinically Significant Risk Factors                                  Disposition Plan     Expected Discharge Date: 03/15/2023      Destination: home with family            Chavo Mccullough MD  Hospitalist Service  [facility]  Securely message with [employer] (more info)  Text page via [employer] Paging/Directory   ______________________________________________________________________    Interval History   --no acute interval events  -seen in early morning, no new symptoms or shortness of breath.  -Initially we discussed discharge, and plan was for outpatient ablation  -Cardiology later offered possibly being able to do this at [facility] tomorrow  -Discussed with the patient again, and she would opt to stay here until transfer at [facility]  -No other new symptoms or changes  -The lightheadedness with the metoprolol has improved withher  improving p.o. intake!  -All questions answered      Physical Exam   Vital  Signs: Temp: 97.9  F (36.6  C) Temp src: Oral BP: (!) 165/77 Pulse: 67   Resp: 18 SpO2: 97 % O2 Device: None (Room air)    Weight: 152 lbs 12.8 oz    General: alert, oriented, and mildly distressed- flushed and feels lightheaded  HENT- wnl  Pulmonary: clear to auscultation bilaterally, normal respiratory effort, on room air, no rales or wheezes or evidence of accessory muscle use  CVS: regular rate and rhythm, no murmurs, rubs, or gallops; no blatant jugular venous distention; mild nonpitting lower leg extremity edema and extremities remain warm to the touch  GI: soft, nontender, BS+, no rebound or guarding, no conspicuous organomegaly   Neuro: nonfocal, moves all extremities of own volition  Psych: appropriate    Medical Decision Making       45 MINUTES SPENT BY ME on the date of service doing chart review, history, exam, documentation & further activities per the note.      Data     I have personally reviewed the following data over the past 24 hrs:    N/A  \   N/A   / N/A     133 (L) N/A N/A /  N/A   N/A N/A 0.65 \       Imaging results reviewed over the past 24 hrs:   No results found for this or any previous visit (from the past 24 hour(s)).

## 2023-03-15 NOTE — PLAN OF CARE
Problem: Dysrhythmia  Goal: Normalized Cardiac Rhythm     Problem: Risk for Delirium  Goal: Improved Behavioral Control     Goal Outcome Evaluation:  VSS, elevated Bp's and HR in to 60's. Tele SR. Denies dizziness, SOB, or pain. AxOx4, resting in bed through the night.

## 2023-03-15 NOTE — PROGRESS NOTES
"  Cardiology Progress Note    Assessment:  1.  Supraventricular tachycardia.  She has recently been seen by EP colleague Dr. Hanley  2023 with wide-complex tachycardia associated with palpitations noted for the first time November 2022 and suspected SVT with aberrancy at that time.  Cardiac MRI January 2022 reported normal ejection fraction without fibrosis infiltrative ischemia.  She has had a history of syncope that was felt to be vagally mediated.  ER admit note reviewed.  She did have syncope associated with the SVT.  I described the ablation procedure in detail with her today.  Principal Problem:    Syncope, unspecified syncope type  Active Problems:    GERD (gastroesophageal reflux disease)    Mixed hyperlipidemia    Moderate major depression (H)    SVT (supraventricular tachycardia) (H)      Plan:  1.  I did place a call to EP to discuss plan further. Dr Cobian indicated that he could put her on the schedule for tomorrow.  I reviewed this with her and she is undecided.  She will speak with her  and let us know what her decision is.  I did inform her that my concern was that if she has additional SVT and syncopal episodes that she could hurt herself.    Appreciate discussion with , she will transfer to [facility] for the procedure tomorrow.  Questions answered.    Subjective:   Anabelle Cameron is seen in follow-up.  First visit for this examiner.  Chart record reviewed.  Episodes of SVT.  Reportedly not taking her beta-blocker since her visit in February.  She initially declined ablation procedure the day of admission she had recurrent syncope.  She had symptoms of room spinning and lightheadedness as well as palpitations.  She denied chest discomfort.    Objective:   Vital signs in last 24 hours:  VITALS: BP (!) 165/77 (BP Location: Right arm)   Pulse 67   Temp 97.9  F (36.6  C) (Oral)   Resp 18   Ht 1.676 m (5' 6\")   Wt 69.3 kg (152 lb 12.8 oz)   SpO2 97%   BMI 24.66 kg/m    BMI: " Body mass index is 24.66 kg/m .  Wt Readings from Last 3 Encounters:   03/15/23 69.3 kg (152 lb 12.8 oz)   23 66.2 kg (146 lb)   23 72 kg (158 lb 11.7 oz)       Intake/Output Summary (Last 24 hours) at 3/15/2023 0930  Last data filed at 3/15/2023 0638  Gross per 24 hour   Intake 960 ml   Output 1400 ml   Net -440 ml       Physical Exam:  General: Resting comfortably in bed in no acute distress   Neck: No JVD   Lungs: clear to auscultation   COR: RRR, No significant murmur   Abd: nondistended, BS present   Extrem: No edema, warm  Neuro: No focal deficits.    Cardiographics:    ECG: 3/13 SVT with rapid ventricular response.  Echocardiogram:   Echocardiogram Complete with Bubble Study  Order: [id number]  Status: Edited Result - FINAL     Visible to patient: Yes (not seen)     Next appt: None     0 Result Notes  Details    Reading Physician Reading Date Result Priority   Qamar Ozuna MD  [phone] 3/13/2023      Narrative & Impression  [id number]  [id number]  [id number]^MURIEL^ORLANDO^[place] [address]     Name: SHI LERMA  MRN: [id number]  : 1940  Study Date: 2023 02:57 PM  Age: 82 yrs  Gender: Female  Patient Location: [facility]  Reason For Study: Syncope  Ordering Physician: ORLANDO LLAMAS  Performed By: BARBARA/CONSUELO     BSA: 1.8 m2  Height: 66 in  Weight: 150 lb  HR: 79  BP: 128/95 mmHg  ______________________________________________________________________________  Procedure  Complete Portable Bubble Echo Adult.  ______________________________________________________________________________  Interpretation Summary     The right ventricle is normal size.  The right ventricular systolic function is normal.  The left ventricle is normal in size.  The visual ejection fraction is 50-55%.  No regional wall motion abnormalities noted.  IVC diameter <2.1 cm collapsing >50% with sniff suggests a normal RA pressure  of 3 mmHg.  Sinus rhythm was  noted.  No hemodynamically significant valvular abnormalities on 2D or color flow  imaging.  ______________________________________________________________________________  Left Ventricle  The left ventricle is normal in size. Proximal septal thickening is noted.  Diastolic Doppler findings (E/E' ratio and/or other parameters) suggest left  ventricular filling pressures are indeterminate. The visual ejection fraction  is 50-55%. No regional wall motion abnormalities noted.     Right Ventricle  The right ventricle is normal size. TAPSE is normal, which is consistent with  normal right ventricular systolic function. The right ventricular systolic  function is normal.     Atria  The left atrium is mildly dilated. Right atrial size is normal. A contrast  injection (Bubble Study) was performed that was negative for flow across the  interatrial septum.     Mitral Valve  The mitral valve leaflets appear thickened, but open well. There is no mitral  regurgitation noted. There is no mitral valve stenosis.     Tricuspid Valve  Tricuspid valve leaflets appear normal. There is mild (1+) tricuspid  regurgitation. There is no tricuspid stenosis.     Aortic Valve  Mild aortic valve calcification is present. The aortic valve is trileaflet. No  aortic regurgitation is present. No aortic stenosis is present.     Pulmonic Valve  The pulmonic valve is not well visualized. There is no pulmonic valvular  regurgitation. There is no pulmonic valvular stenosis.     Vessels  The aorta root is normal. Normal size ascending aorta. IVC diameter <2.1 cm  collapsing >50% with sniff suggests a normal RA pressure of 3 mmHg.     Pericardium  There is no pericardial effusion.     Rhythm  Sinus rhythm was noted.           Telemetry: Sinus rhythm, no additional SVT.    Imaging:   Chest X-Ray:  EXAM: US CAROTID BILATERAL  LOCATION: [facility]  DATE/TIME: 3/13/2023 5:24 PM     INDICATION: syncope  COMPARISON: None.  TECHNIQUE:  Duplex exam performed utilizing 2D gray-scale imaging, Doppler interrogation with color-flow and spectral waveform analysis. The percent diameter stenosis is determined using NASCET criteria and Society of Radiologists in Ultrasound Consensus   Criteria.     FINDINGS:     RIGHT: Mild plaque at the bifurcation. The peak systolic velocity in the ICA is less than 125 cm/sec, consistent with less than 50% stenosis. Normal velocities in the ECA. Antegrade flow within the vertebral artery.      LEFT: Mild plaque at the bifurcation. The peak systolic velocity in the ICA is less than 125 cm/sec, consistent with less than 50% stenosis. Normal velocities in the ECA. Antegrade flow within the vertebral artery.     VELOCITY CHART:  CCA   Right: 73/17 cm/s   Left: 61/18 cm/s  ICA   Right: 100/28 cm/s   Left: 106/31 cm/s  ECA   Right: 57/6 cm/s   Left: 61/13 cm/s  ICA/CCA PSV Ratio   Right: 1.4   Left: 1.7                                                                      IMPRESSION:  1.  Mild plaque formation, velocities consistent with less than 50% stenosis in the right internal carotid artery.  2.  Mild plaque formation, velocities consistent with less than 50% stenosis in the left internal carotid artery.  3.  Flow within the vertebral arteries is antegrade    EXAM: CTA CHEST ABDOMEN PELVIS W CONTRAST  LOCATION: [facility]  DATE/TIME: 3/13/2023 2:29 PM     INDICATION: arrhythmia, unsteady on feet  COMPARISON: CT pulmonary angiogram 01/01/2023  TECHNIQUE: CT angiogram chest abdomen pelvis during arterial phase of injection of IV contrast. 2D and 3D MIP reconstructions were performed by the CT technologist. Dose reduction techniques were used.   CONTRAST: Isovue 370 100mL     FINDINGS:   CT ANGIOGRAM CHEST, ABDOMEN, AND PELVIS: Pulsation artifact at the aortic root. No thoracic aortic aneurysm. 2 vessel arch anatomy. Mild mixed attenuation atheroma in the distal arch and descending thoracic aorta.  Main pulmonary artery is normal caliber.   There are no pulmonary artery filling defects.     Mild mixed attenuation atheroma in the normal caliber abdominal aorta. Iliac and common femoral arteries are widely patent. Mesenteric and renal arteries are normal.     LUNGS AND PLEURA: Unchanged subsegmental sized opacity in the lateral segment right middle lobe abutting the anterolateral costal pleura consistent with focal atelectasis. Limited territories of subpleural groundglass are present in the posterior lower   lobes consistent with positional atelectasis. There is a pneumatocele in the medial right lower lobe. No new airspace opacities or lung nodules. Central airways are patent. No pleural effusion.     MEDIASTINUM: Cardiac chambers are normal in size. No pericardial effusion. Mild mitral annular calcifications. Thyroid gland is mildly heterogeneous. No enlarged mediastinal or hilar lymph nodes. A small hiatal hernia.     CORONARY ARTERY CALCIFICATION: Mild.     HEPATOBILIARY: Normal.     PANCREAS: Normal.     SPLEEN: Normal.     ADRENAL GLANDS: Normal.     KIDNEYS/BLADDER: Kidneys have normal size, cortex thickness and symmetric parenchymal enhancement. Prominent extrarenal pelves. No urinary tract calculi. Urinary bladder is normal.     BOWEL: Nondistended stomach. Normal caliber small bowel. Diverticula arise from the sigmoid colon. No bowel wall thickening or abnormal mucosal enhancement. No ascites.     LYMPH NODES: Normal.     PELVIC ORGANS: Uterus is absent. Normal ovaries.     MUSCULOSKELETAL: Status post L4-S1 posterior decompression and fixation. Generalized demineralization of the bones. No aggressive or destructive bone lesions. Mild reciprocal thoracolumbar spinal curvature.                                                                      IMPRESSION:     1.  No acute pulmonary embolism or acute aortic syndrome.  2.  Small hiatal hernia.  3.  Mild sigmoid diverticulosis.      MRN:                   [id number]                                  Name:            SHI LERMA                                  :                  1940                                  Scan Date:   2023 11:12:16                                   Electronically signed by Sony Morocho  14:07:43     VITALS   ==========================================================================================================     HEIGHT: 65.98 in    (167.60 cm)  WEIGHT: 158.73 lbs    (72.00 kgs)  BSA: 1.81 m^2     SUMMARY   ==========================================================================================================     1.  Pharmacological Regadenoson stress cardiac MRI is negative for inducible myocardial ischemia.   2.  No evidence of prior myocardial infarction, focal or diffuse nonvascular myocardial fibrosis, or  infiltrative disease.  3.  Normal left ventricular size, wall thickness and systolic function. The quantified left ventricular  ejection fraction is 64%.   4.  Normal right ventricular size and systolic function.  The quantified right ventricular ejection  fraction is 55%.  5.  No significant valvular abnormalities.   Lab Results    Chemistry/lipid CBC Cardiac Enzymes/BNP/TSH/INR   No results for input(s): CHOL, HDL, LDL, TRIG, CHOLHDLRATIO in the last 39237 hours.  No results for input(s): LDL in the last 37414 hours.  Recent Labs   Lab Test 03/15/23  0449 23  0445   * 135*   POTASSIUM  --  4.0   CHLORIDE  --  108*   CO2  --  22   GLC  --  93   BUN  --  15   CR 0.65 0.60   GFRESTIMATED 86 88   KINJAL  --  8.7     Recent Labs   Lab Test 03/15/23  0449 23  0445 23  1315   CR 0.65 0.60 0.65     No results for input(s): A1C in the last 58124 hours.       Recent Labs   Lab Test 23  0445   WBC 6.5   HGB 12.0   HCT 36.6   MCV 93        Recent Labs   Lab Test 23  0445 23  1315 23  1506   HGB 12.0 12.7 12.4    Recent Labs   Lab Test 23  1315  01/01/23  1506 06/21/22  2301   TROPONINI <0.01 <0.01 <0.01     Recent Labs   Lab Test 06/21/22 2001   BNP 44     Recent Labs   Lab Test 03/13/23  1315   TSH 2.20     Recent Labs   Lab Test 03/13/23  1315   INR 1.00

## 2023-03-16 ENCOUNTER — HOSPITAL ENCOUNTER (INPATIENT)
Facility: HOSPITAL | Age: 83
LOS: 1 days | Discharge: HOME | DRG: 274 | End: 2023-03-17
Attending: INTERNAL MEDICINE | Admitting: INTERNAL MEDICINE
Payer: COMMERCIAL

## 2023-03-16 ENCOUNTER — ANESTHESIA (OUTPATIENT)
Dept: CARDIOLOGY | Facility: HOSPITAL | Age: 83
DRG: 274 | End: 2023-03-16
Payer: COMMERCIAL

## 2023-03-16 VITALS
BODY MASS INDEX: 24.84 KG/M2 | WEIGHT: 154.54 LBS | OXYGEN SATURATION: 97 % | HEIGHT: 66 IN | DIASTOLIC BLOOD PRESSURE: 73 MMHG | TEMPERATURE: 97.7 F | RESPIRATION RATE: 18 BRPM | SYSTOLIC BLOOD PRESSURE: 148 MMHG | HEART RATE: 70 BPM

## 2023-03-16 DIAGNOSIS — I47.10 SVT (SUPRAVENTRICULAR TACHYCARDIA) (H): ICD-10-CM

## 2023-03-16 LAB
ATRIAL RATE - MUSE: 80 BPM
DIASTOLIC BLOOD PRESSURE - MUSE: NORMAL MMHG
INTERPRETATION ECG - MUSE: NORMAL
P AXIS - MUSE: 111 DEGREES
PR INTERVAL - MUSE: 172 MS
QRS DURATION - MUSE: 74 MS
QT - MUSE: 412 MS
QTC - MUSE: 475 MS
R AXIS - MUSE: 11 DEGREES
SYSTOLIC BLOOD PRESSURE - MUSE: NORMAL MMHG
T AXIS - MUSE: 24 DEGREES
VENTRICULAR RATE- MUSE: 80 BPM

## 2023-03-16 PROCEDURE — 272N000001 HC OR GENERAL SUPPLY STERILE: Performed by: INTERNAL MEDICINE

## 2023-03-16 PROCEDURE — 4A023CZ MEASUREMENT OF CARDIAC RATE, PERCUTANEOUS APPROACH: ICD-10-PCS | Performed by: INTERNAL MEDICINE

## 2023-03-16 PROCEDURE — 4A023FZ MEASUREMENT OF CARDIAC RHYTHM, PERCUTANEOUS APPROACH: ICD-10-PCS | Performed by: INTERNAL MEDICINE

## 2023-03-16 PROCEDURE — 210N000001 HC R&B IMCU HEART CARE

## 2023-03-16 PROCEDURE — 93623 PRGRMD STIMJ&PACG IV RX NFS: CPT | Performed by: INTERNAL MEDICINE

## 2023-03-16 PROCEDURE — C1730 CATH, EP, 19 OR FEW ELECT: HCPCS | Performed by: INTERNAL MEDICINE

## 2023-03-16 PROCEDURE — 99222 1ST HOSP IP/OBS MODERATE 55: CPT | Performed by: INTERNAL MEDICINE

## 2023-03-16 PROCEDURE — 258N000003 HC RX IP 258 OP 636: Performed by: NURSE ANESTHETIST, CERTIFIED REGISTERED

## 2023-03-16 PROCEDURE — 93653 COMPRE EP EVAL TX SVT: CPT | Performed by: INTERNAL MEDICINE

## 2023-03-16 PROCEDURE — 250N000009 HC RX 250: Performed by: INTERNAL MEDICINE

## 2023-03-16 PROCEDURE — 250N000011 HC RX IP 250 OP 636: Performed by: ANESTHESIOLOGY

## 2023-03-16 PROCEDURE — C1733 CATH, EP, OTHR THAN COOL-TIP: HCPCS | Performed by: INTERNAL MEDICINE

## 2023-03-16 PROCEDURE — 250N000013 HC RX MED GY IP 250 OP 250 PS 637: Performed by: STUDENT IN AN ORGANIZED HEALTH CARE EDUCATION/TRAINING PROGRAM

## 2023-03-16 PROCEDURE — 250N000013 HC RX MED GY IP 250 OP 250 PS 637: Performed by: INTERNAL MEDICINE

## 2023-03-16 PROCEDURE — 02583ZZ DESTRUCTION OF CONDUCTION MECHANISM, PERCUTANEOUS APPROACH: ICD-10-PCS | Performed by: INTERNAL MEDICINE

## 2023-03-16 PROCEDURE — 370N000017 HC ANESTHESIA TECHNICAL FEE, PER MIN: Performed by: INTERNAL MEDICINE

## 2023-03-16 PROCEDURE — 250N000009 HC RX 250: Performed by: NURSE ANESTHETIST, CERTIFIED REGISTERED

## 2023-03-16 PROCEDURE — 93010 ELECTROCARDIOGRAM REPORT: CPT | Performed by: GENERAL ACUTE CARE HOSPITAL

## 2023-03-16 PROCEDURE — 93005 ELECTROCARDIOGRAM TRACING: CPT

## 2023-03-16 PROCEDURE — 258N000003 HC RX IP 258 OP 636: Performed by: NURSE PRACTITIONER

## 2023-03-16 PROCEDURE — C1894 INTRO/SHEATH, NON-LASER: HCPCS | Performed by: INTERNAL MEDICINE

## 2023-03-16 PROCEDURE — 250N000011 HC RX IP 250 OP 636: Performed by: NURSE ANESTHETIST, CERTIFIED REGISTERED

## 2023-03-16 PROCEDURE — 250N000011 HC RX IP 250 OP 636: Performed by: STUDENT IN AN ORGANIZED HEALTH CARE EDUCATION/TRAINING PROGRAM

## 2023-03-16 PROCEDURE — 4A0234Z MEASUREMENT OF CARDIAC ELECTRICAL ACTIVITY, PERCUTANEOUS APPROACH: ICD-10-PCS | Performed by: INTERNAL MEDICINE

## 2023-03-16 PROCEDURE — C1732 CATH, EP, DIAG/ABL, 3D/VECT: HCPCS | Performed by: INTERNAL MEDICINE

## 2023-03-16 PROCEDURE — C1766 INTRO/SHEATH,STRBLE,NON-PEEL: HCPCS | Performed by: INTERNAL MEDICINE

## 2023-03-16 PROCEDURE — 93623 PRGRMD STIMJ&PACG IV RX NFS: CPT | Mod: 26 | Performed by: INTERNAL MEDICINE

## 2023-03-16 PROCEDURE — 99239 HOSP IP/OBS DSCHRG MGMT >30: CPT | Performed by: STUDENT IN AN ORGANIZED HEALTH CARE EDUCATION/TRAINING PROGRAM

## 2023-03-16 RX ORDER — NALOXONE HYDROCHLORIDE 0.4 MG/ML
0.2 INJECTION, SOLUTION INTRAMUSCULAR; INTRAVENOUS; SUBCUTANEOUS
Status: DISCONTINUED | OUTPATIENT
Start: 2023-03-16 | End: 2023-03-17 | Stop reason: HOSPADM

## 2023-03-16 RX ORDER — PROCHLORPERAZINE MALEATE 5 MG
5 TABLET ORAL EVERY 6 HOURS PRN
Status: CANCELLED | OUTPATIENT
Start: 2023-03-16

## 2023-03-16 RX ORDER — ONDANSETRON 2 MG/ML
4 INJECTION INTRAMUSCULAR; INTRAVENOUS EVERY 6 HOURS PRN
Status: CANCELLED | OUTPATIENT
Start: 2023-03-16

## 2023-03-16 RX ORDER — ONDANSETRON 4 MG/1
4 TABLET, ORALLY DISINTEGRATING ORAL EVERY 6 HOURS PRN
Status: CANCELLED | OUTPATIENT
Start: 2023-03-16

## 2023-03-16 RX ORDER — NALOXONE HYDROCHLORIDE 0.4 MG/ML
0.4 INJECTION, SOLUTION INTRAMUSCULAR; INTRAVENOUS; SUBCUTANEOUS
Status: DISCONTINUED | OUTPATIENT
Start: 2023-03-16 | End: 2023-03-17 | Stop reason: HOSPADM

## 2023-03-16 RX ORDER — ACETAMINOPHEN 500 MG
500-1000 TABLET ORAL EVERY 6 HOURS PRN
Status: CANCELLED | OUTPATIENT
Start: 2023-03-16

## 2023-03-16 RX ORDER — ONDANSETRON 4 MG/1
4 TABLET, ORALLY DISINTEGRATING ORAL EVERY 30 MIN PRN
Status: CANCELLED | OUTPATIENT
Start: 2023-03-16

## 2023-03-16 RX ORDER — POLYETHYLENE GLYCOL 3350 17 G/17G
17 POWDER, FOR SOLUTION ORAL DAILY PRN
Status: DISCONTINUED | OUTPATIENT
Start: 2023-03-16 | End: 2023-03-17 | Stop reason: HOSPADM

## 2023-03-16 RX ORDER — ACETAMINOPHEN 500 MG
500-1000 TABLET ORAL EVERY 6 HOURS PRN
Status: DISCONTINUED | OUTPATIENT
Start: 2023-03-16 | End: 2023-03-17 | Stop reason: HOSPADM

## 2023-03-16 RX ORDER — PROCHLORPERAZINE 25 MG
12.5 SUPPOSITORY, RECTAL RECTAL EVERY 12 HOURS PRN
Status: CANCELLED | OUTPATIENT
Start: 2023-03-16

## 2023-03-16 RX ORDER — METOPROLOL TARTRATE 25 MG/1
25 TABLET, FILM COATED ORAL 2 TIMES DAILY
Status: CANCELLED | OUTPATIENT
Start: 2023-03-16

## 2023-03-16 RX ORDER — PROCHLORPERAZINE MALEATE 5 MG
5 TABLET ORAL EVERY 6 HOURS PRN
Status: DISCONTINUED | OUTPATIENT
Start: 2023-03-16 | End: 2023-03-17 | Stop reason: HOSPADM

## 2023-03-16 RX ORDER — HYDROXYZINE HYDROCHLORIDE 25 MG/1
25 TABLET, FILM COATED ORAL EVERY 6 HOURS PRN
Status: CANCELLED | OUTPATIENT
Start: 2023-03-16

## 2023-03-16 RX ORDER — FENTANYL CITRATE 50 UG/ML
25 INJECTION, SOLUTION INTRAMUSCULAR; INTRAVENOUS EVERY 5 MIN PRN
Status: CANCELLED | OUTPATIENT
Start: 2023-03-16

## 2023-03-16 RX ORDER — OXYCODONE HYDROCHLORIDE 5 MG/1
10 TABLET ORAL
Status: CANCELLED | OUTPATIENT
Start: 2023-03-16

## 2023-03-16 RX ORDER — FENTANYL CITRATE 50 UG/ML
25 INJECTION, SOLUTION INTRAMUSCULAR; INTRAVENOUS
Status: DISCONTINUED | OUTPATIENT
Start: 2023-03-16 | End: 2023-03-16 | Stop reason: HOSPADM

## 2023-03-16 RX ORDER — ONDANSETRON 2 MG/ML
4 INJECTION INTRAMUSCULAR; INTRAVENOUS EVERY 30 MIN PRN
Status: CANCELLED | OUTPATIENT
Start: 2023-03-16

## 2023-03-16 RX ORDER — ONDANSETRON 2 MG/ML
4 INJECTION INTRAMUSCULAR; INTRAVENOUS EVERY 6 HOURS PRN
Status: DISCONTINUED | OUTPATIENT
Start: 2023-03-16 | End: 2023-03-17 | Stop reason: HOSPADM

## 2023-03-16 RX ORDER — MECLIZINE HCL 12.5 MG 12.5 MG/1
12.5 TABLET ORAL 3 TIMES DAILY PRN
Status: CANCELLED | OUTPATIENT
Start: 2023-03-16

## 2023-03-16 RX ORDER — HYDROMORPHONE HYDROCHLORIDE 1 MG/ML
0.4 INJECTION, SOLUTION INTRAMUSCULAR; INTRAVENOUS; SUBCUTANEOUS EVERY 5 MIN PRN
Status: CANCELLED | OUTPATIENT
Start: 2023-03-16

## 2023-03-16 RX ORDER — FLUTICASONE PROPIONATE 50 MCG
1 SPRAY, SUSPENSION (ML) NASAL DAILY PRN
Status: CANCELLED | OUTPATIENT
Start: 2023-03-16

## 2023-03-16 RX ORDER — DEXMEDETOMIDINE HYDROCHLORIDE 4 UG/ML
.1-1.5 INJECTION, SOLUTION INTRAVENOUS CONTINUOUS
Status: DISCONTINUED | OUTPATIENT
Start: 2023-03-16 | End: 2023-03-16 | Stop reason: HOSPADM

## 2023-03-16 RX ORDER — MECLIZINE HCL 12.5 MG 12.5 MG/1
12.5 TABLET ORAL 3 TIMES DAILY PRN
Status: DISCONTINUED | OUTPATIENT
Start: 2023-03-16 | End: 2023-03-17 | Stop reason: HOSPADM

## 2023-03-16 RX ORDER — FLUTICASONE PROPIONATE 50 MCG
1 SPRAY, SUSPENSION (ML) NASAL DAILY PRN
Status: DISCONTINUED | OUTPATIENT
Start: 2023-03-16 | End: 2023-03-17 | Stop reason: HOSPADM

## 2023-03-16 RX ORDER — HYDROMORPHONE HYDROCHLORIDE 1 MG/ML
0.2 INJECTION, SOLUTION INTRAMUSCULAR; INTRAVENOUS; SUBCUTANEOUS EVERY 5 MIN PRN
Status: CANCELLED | OUTPATIENT
Start: 2023-03-16

## 2023-03-16 RX ORDER — HYDROXYZINE HYDROCHLORIDE 25 MG/1
25 TABLET, FILM COATED ORAL EVERY 6 HOURS PRN
Status: DISCONTINUED | OUTPATIENT
Start: 2023-03-16 | End: 2023-03-17 | Stop reason: HOSPADM

## 2023-03-16 RX ORDER — METOPROLOL TARTRATE 25 MG/1
25 TABLET, FILM COATED ORAL 2 TIMES DAILY
Status: DISCONTINUED | OUTPATIENT
Start: 2023-03-16 | End: 2023-03-17 | Stop reason: HOSPADM

## 2023-03-16 RX ORDER — QUETIAPINE FUMARATE 25 MG/1
25 TABLET, FILM COATED ORAL 3 TIMES DAILY PRN
Status: DISCONTINUED | OUTPATIENT
Start: 2023-03-16 | End: 2023-03-17 | Stop reason: HOSPADM

## 2023-03-16 RX ORDER — FENTANYL CITRATE 50 UG/ML
INJECTION, SOLUTION INTRAMUSCULAR; INTRAVENOUS PRN
Status: DISCONTINUED | OUTPATIENT
Start: 2023-03-16 | End: 2023-03-16

## 2023-03-16 RX ORDER — OXYCODONE HYDROCHLORIDE 5 MG/1
5 TABLET ORAL
Status: CANCELLED | OUTPATIENT
Start: 2023-03-16

## 2023-03-16 RX ORDER — LIDOCAINE 40 MG/G
CREAM TOPICAL
Status: CANCELLED | OUTPATIENT
Start: 2023-03-16

## 2023-03-16 RX ORDER — BENZONATATE 100 MG/1
200 CAPSULE ORAL 3 TIMES DAILY PRN
Status: DISCONTINUED | OUTPATIENT
Start: 2023-03-16 | End: 2023-03-17 | Stop reason: HOSPADM

## 2023-03-16 RX ORDER — SODIUM CHLORIDE, SODIUM LACTATE, POTASSIUM CHLORIDE, CALCIUM CHLORIDE 600; 310; 30; 20 MG/100ML; MG/100ML; MG/100ML; MG/100ML
INJECTION, SOLUTION INTRAVENOUS CONTINUOUS
Status: DISCONTINUED | OUTPATIENT
Start: 2023-03-16 | End: 2023-03-16 | Stop reason: HOSPADM

## 2023-03-16 RX ORDER — PANTOPRAZOLE SODIUM 40 MG/1
40 TABLET, DELAYED RELEASE ORAL DAILY
Status: DISCONTINUED | OUTPATIENT
Start: 2023-03-17 | End: 2023-03-17 | Stop reason: HOSPADM

## 2023-03-16 RX ORDER — LIDOCAINE 40 MG/G
CREAM TOPICAL
Status: DISCONTINUED | OUTPATIENT
Start: 2023-03-16 | End: 2023-03-16

## 2023-03-16 RX ORDER — ONDANSETRON 2 MG/ML
INJECTION INTRAMUSCULAR; INTRAVENOUS PRN
Status: DISCONTINUED | OUTPATIENT
Start: 2023-03-16 | End: 2023-03-16

## 2023-03-16 RX ORDER — LIDOCAINE 40 MG/G
CREAM TOPICAL
Status: DISCONTINUED | OUTPATIENT
Start: 2023-03-16 | End: 2023-03-16 | Stop reason: HOSPADM

## 2023-03-16 RX ORDER — PROCHLORPERAZINE 25 MG
12.5 SUPPOSITORY, RECTAL RECTAL EVERY 12 HOURS PRN
Status: DISCONTINUED | OUTPATIENT
Start: 2023-03-16 | End: 2023-03-17 | Stop reason: HOSPADM

## 2023-03-16 RX ORDER — SODIUM CHLORIDE, SODIUM LACTATE, POTASSIUM CHLORIDE, CALCIUM CHLORIDE 600; 310; 30; 20 MG/100ML; MG/100ML; MG/100ML; MG/100ML
INJECTION, SOLUTION INTRAVENOUS CONTINUOUS
Status: CANCELLED | OUTPATIENT
Start: 2023-03-16

## 2023-03-16 RX ORDER — SODIUM CHLORIDE 9 MG/ML
100 INJECTION, SOLUTION INTRAVENOUS CONTINUOUS
Status: CANCELLED | OUTPATIENT
Start: 2023-03-16

## 2023-03-16 RX ORDER — PROPOFOL 10 MG/ML
INJECTION, EMULSION INTRAVENOUS CONTINUOUS PRN
Status: DISCONTINUED | OUTPATIENT
Start: 2023-03-16 | End: 2023-03-16

## 2023-03-16 RX ORDER — PRAVASTATIN SODIUM 10 MG
10 TABLET ORAL AT BEDTIME
Status: CANCELLED | OUTPATIENT
Start: 2023-03-16

## 2023-03-16 RX ORDER — SODIUM CHLORIDE 9 MG/ML
100 INJECTION, SOLUTION INTRAVENOUS CONTINUOUS
Status: DISCONTINUED | OUTPATIENT
Start: 2023-03-16 | End: 2023-03-16 | Stop reason: HOSPADM

## 2023-03-16 RX ORDER — ONDANSETRON 4 MG/1
4 TABLET, ORALLY DISINTEGRATING ORAL EVERY 6 HOURS PRN
Status: DISCONTINUED | OUTPATIENT
Start: 2023-03-16 | End: 2023-03-17 | Stop reason: HOSPADM

## 2023-03-16 RX ORDER — QUETIAPINE FUMARATE 25 MG/1
25 TABLET, FILM COATED ORAL 3 TIMES DAILY PRN
Status: CANCELLED | OUTPATIENT
Start: 2023-03-16

## 2023-03-16 RX ORDER — POLYETHYLENE GLYCOL 3350 17 G/17G
17 POWDER, FOR SOLUTION ORAL DAILY PRN
Status: CANCELLED | OUTPATIENT
Start: 2023-03-16

## 2023-03-16 RX ORDER — FENTANYL CITRATE 50 UG/ML
50 INJECTION, SOLUTION INTRAMUSCULAR; INTRAVENOUS EVERY 5 MIN PRN
Status: CANCELLED | OUTPATIENT
Start: 2023-03-16

## 2023-03-16 RX ORDER — IBUPROFEN 400 MG/1
400 TABLET, FILM COATED ORAL EVERY 6 HOURS PRN
Status: CANCELLED | OUTPATIENT
Start: 2023-03-16

## 2023-03-16 RX ORDER — PRAVASTATIN SODIUM 10 MG
10 TABLET ORAL AT BEDTIME
Status: DISCONTINUED | OUTPATIENT
Start: 2023-03-16 | End: 2023-03-17 | Stop reason: HOSPADM

## 2023-03-16 RX ORDER — BENZONATATE 100 MG/1
200 CAPSULE ORAL 3 TIMES DAILY PRN
Status: CANCELLED | OUTPATIENT
Start: 2023-03-16

## 2023-03-16 RX ADMIN — OMEPRAZOLE 40 MG: 20 CAPSULE, DELAYED RELEASE ORAL at 07:57

## 2023-03-16 RX ADMIN — SODIUM CHLORIDE: 9 INJECTION, SOLUTION INTRAVENOUS at 15:38

## 2023-03-16 RX ADMIN — FENTANYL CITRATE 25 MCG: 50 INJECTION, SOLUTION INTRAMUSCULAR; INTRAVENOUS at 15:33

## 2023-03-16 RX ADMIN — METOPROLOL TARTRATE 25 MG: 25 TABLET, FILM COATED ORAL at 07:57

## 2023-03-16 RX ADMIN — DEXMEDETOMIDINE HYDROCHLORIDE 6 MCG: 100 INJECTION, SOLUTION INTRAVENOUS at 14:58

## 2023-03-16 RX ADMIN — PROPOFOL 50 MCG/KG/MIN: 10 INJECTION, EMULSION INTRAVENOUS at 14:49

## 2023-03-16 RX ADMIN — ONDANSETRON 4 MG: 2 INJECTION INTRAMUSCULAR; INTRAVENOUS at 13:01

## 2023-03-16 RX ADMIN — ACETAMINOPHEN 1000 MG: 500 TABLET ORAL at 18:42

## 2023-03-16 RX ADMIN — FENTANYL CITRATE 25 MCG: 50 INJECTION, SOLUTION INTRAMUSCULAR; INTRAVENOUS at 15:27

## 2023-03-16 RX ADMIN — ACETAMINOPHEN 1000 MG: 500 TABLET ORAL at 01:37

## 2023-03-16 RX ADMIN — REMIFENTANIL HYDROCHLORIDE 0.05 MCG/KG/MIN: 1 INJECTION, POWDER, LYOPHILIZED, FOR SOLUTION INTRAVENOUS at 12:52

## 2023-03-16 RX ADMIN — PRAVASTATIN SODIUM 10 MG: 10 TABLET ORAL at 20:57

## 2023-03-16 RX ADMIN — ONDANSETRON 4 MG: 2 INJECTION INTRAMUSCULAR; INTRAVENOUS at 16:20

## 2023-03-16 RX ADMIN — FENTANYL CITRATE 25 MCG: 50 INJECTION, SOLUTION INTRAMUSCULAR; INTRAVENOUS at 15:37

## 2023-03-16 RX ADMIN — SODIUM CHLORIDE 100 ML/HR: 9 INJECTION, SOLUTION INTRAVENOUS at 12:03

## 2023-03-16 RX ADMIN — METOPROLOL TARTRATE 25 MG: 25 TABLET, FILM COATED ORAL at 19:54

## 2023-03-16 RX ADMIN — FENTANYL CITRATE 25 MCG: 50 INJECTION, SOLUTION INTRAMUSCULAR; INTRAVENOUS at 15:26

## 2023-03-16 ASSESSMENT — ACTIVITIES OF DAILY LIVING (ADL)
ADLS_ACUITY_SCORE: 24
ADLS_ACUITY_SCORE: 33
ADLS_ACUITY_SCORE: 37
ADLS_ACUITY_SCORE: 24
ADLS_ACUITY_SCORE: 39
ADLS_ACUITY_SCORE: 24
ADLS_ACUITY_SCORE: 33
ADLS_ACUITY_SCORE: 24
ADLS_ACUITY_SCORE: 33
ADLS_ACUITY_SCORE: 39

## 2023-03-16 ASSESSMENT — ENCOUNTER SYMPTOMS: DYSRHYTHMIAS: 1

## 2023-03-16 NOTE — Clinical Note
[employer] system 12 lead EKG, hemodynamics 5 lead, pulse oximetery, NIBP, [employer] hands off defibrillator/external pacer, with 3 monitoring leads to patient. Baseline assessment done.

## 2023-03-16 NOTE — ANESTHESIA PREPROCEDURE EVALUATION
Anesthesia Pre-Procedure Evaluation    Patient: Anabelle Cameron   MRN: [id number] : 1940        Procedure : Procedure(s):  Comprehensive Study  Ablation Supraventricular Tachycardia          Past Medical History:   Diagnosis Date     Acute pancreatitis 2013     Allergy      DDD (degenerative disc disease), cervical 2008     Depressive disorder      Dysuria 2021     GERD (gastroesophageal reflux disease) 2010     Hip bursitis 2012     Meningioma (H) 2010     Osteoarthrosis 2013     SVT (supraventricular tachycardia) (H)      Syncope      Venous tributary (branch) occlusion of retina 2021    Formatting of this note might be different from the original. LEFT EYE Occurred at time of IOL/ERM      Past Surgical History:   Procedure Laterality Date     APPENDECTOMY OPEN       GYN SURGERY       ORTHOPEDIC SURGERY        Allergies   Allergen Reactions     Levofloxacin Other (See Comments), Dizziness, Nausea and Unknown     dizziness  Patient unable to tolerate side effects    Patient unable to tolerate side effects  dizziness  Patient unable to tolerate side effects       Hydrocodone-Acetaminophen Other (See Comments) and Unknown     Dizziness, fainting  Other reaction(s): Syncope  Vasovagal episode      Vasovagal episode  Dizziness, fainting  Other reaction(s): Syncope  Vasovagal episode     Lisinopril Cough     Oxycodone-Acetaminophen Other (See Comments), Dizziness and Rash     Dizziness, fainting      Social History     Tobacco Use     Smoking status: Former     Types: Cigarettes     Quit date:      Years since quittin.2     Smokeless tobacco: Never   Substance Use Topics     Alcohol use: Never     Comment: social      Wt Readings from Last 1 Encounters:   23 70.1 kg (154 lb 8.7 oz)        Anesthesia Evaluation   Pt has had prior anesthetic.     No history of anesthetic complications       ROS/MED HX  ENT/Pulmonary:  - neg pulmonary ROS     Neurologic:   - neg neurologic ROS   (+) peripheral neuropathy,     Cardiovascular:     (+) Dyslipidemia -----fainting (syncope). dysrhythmias, Other,     METS/Exercise Tolerance: >4 METS    Hematologic:       Musculoskeletal:   (+) arthritis,     GI/Hepatic:     (+) GERD, Asymptomatic on medication,     Renal/Genitourinary:  - neg Renal ROS     Endo:  - neg endo ROS     Psychiatric/Substance Use:     (+) psychiatric history depression     Infectious Disease:  - neg infectious disease ROS     Malignancy:  - neg malignancy ROS     Other:  - neg other ROS    (+) , H/O Chronic Pain,        Physical Exam    Airway  airway exam normal      Mallampati: II   TM distance: > 3 FB   Neck ROM: full   Mouth opening: > 3 cm    Respiratory Devices and Support         Dental       (+) Removable bridges or other hardware      Cardiovascular   cardiovascular exam normal          Pulmonary   pulmonary exam normal                OUTSIDE LABS:  CBC:   Lab Results   Component Value Date    WBC 6.5 03/14/2023    WBC 10.2 03/13/2023    HGB 12.0 03/14/2023    HGB 12.7 03/13/2023    HCT 36.6 03/14/2023    HCT 39.1 03/13/2023     03/14/2023     03/13/2023     BMP:   Lab Results   Component Value Date     (L) 03/15/2023     (L) 03/14/2023    POTASSIUM 4.0 03/14/2023    POTASSIUM 4.0 03/13/2023    CHLORIDE 108 (H) 03/14/2023    CHLORIDE 104 03/13/2023    CO2 22 03/14/2023    CO2 24 03/13/2023    BUN 15 03/14/2023    BUN 15 03/13/2023    CR 0.65 03/15/2023    CR 0.60 03/14/2023    GLC 93 03/14/2023    GLC 93 03/13/2023     COAGS:   Lab Results   Component Value Date    PTT 27 03/13/2023    INR 1.00 03/13/2023     POC:   Lab Results   Component Value Date     10/15/2005     HEPATIC:   Lab Results   Component Value Date    ALBUMIN 3.7 03/13/2023    PROTTOTAL 7.0 03/13/2023    ALT 16 03/13/2023    AST 25 03/13/2023    ALKPHOS 125 (H) 03/13/2023    BILITOTAL 0.5 03/13/2023     OTHER:   Lab Results   Component Value Date    LACT  2.0 07/17/2021    KINJAL 8.7 03/14/2023    MAG 1.8 03/13/2023    LIPASE 18 01/01/2023    AMYLASE 123 (H) 07/28/2012    TSH 2.20 03/13/2023       Anesthesia Plan    ASA Status:  3   NPO Status:  NPO Appropriate    Anesthesia Type: MAC.     - Reason for MAC: straight local not clinically adequate      Maintenance: TIVA.        Consents    Anesthesia Plan(s) and associated risks, benefits, and realistic alternatives discussed. Questions answered and patient/representative(s) expressed understanding.    - Discussed:     - Discussed with:  Patient      - Extended Intubation/Ventilatory Support Discussed: No.      - Patient is DNR/DNI Status: No    Use of blood products discussed: No .     Postoperative Care    Pain management: IV analgesics, Multi-modal analgesia, Oral pain medications.   PONV prophylaxis: Ondansetron (or other 5HT-3)     Comments:                Nilton Velázquez MD

## 2023-03-16 NOTE — INTERVAL H&P NOTE
I have reviewed the surgical (or preoperative) H&P that is linked to this encounter, and examined the patient. There are no significant changes    Clinical Conditions Present on Arrival:  Clinically Significant Risk Factors Present on Admission           # Hyponatremia: Lowest Na = 133 mmol/L in last 30 days, will monitor as appropriate

## 2023-03-16 NOTE — DISCHARGE INSTRUCTIONS
[facility]  Cardiac Electrophysiology  [address]  [place] [address]   Office: [phone]  Fax: [phone]     Cardiac Electrophysiology - Post Ablation Discharge Instructions      PROCEDURE   SVT ablation         MEDICATION INSTRUCTIONS   Continue taking home meds         DISCHARGE INSTRUCTIONS   General instructions  Have an adult stay with you until tomorrow.  You may resume your normal diet.    You may shower tomorrow.  Do NOT take a bath, or use a hot tub or pool for at least 1 week. Do not scrub the site. Do not use lotion or powder near the puncture site.    Groin care instructions  For the first 24 hrs - check the puncture site every 1-2 hours while awake.  You may keep a bandaid over the puncture sites for 1 or 2 days post-procedure and thereafter may keep these sites uncovered.  Change the bandaid daily.  If there is minor oozing, apply another bandaid and remove it after 12 hours.  For 2 days, when you cough, sneeze, laugh or move your bowels, hold your hand over the puncture site and press firmly.  Mild bruising at the access sites is normal.  If you notice increased swelling, external bleeding, or have other concerns regarding your access sites please consider emergency department evaluation and call your electrophysiology team's office    Activity recommendations  Do not drive for 3 days.  Avoid stooping or squatting more than 90 degrees at the hips for 7 days  Avoid repetitive motions such as loading , vacuuming, raking or shoveling  Avoid heavy lifting (greater than 25lbs) for 1 week    Post ablation instructions  You may have some irregular heartbeats following your ablation.  These may feel very strong and feel like tachycardia re-initiation is imminent - these episodes should occur less frequently over time.  Recurrent tachycardia can occur within the first 3 months post ablation while your heart recovers from the procedure.  Pleuritic  chest discomfort (chest pain worse with taking deep breaths, worse with laying flat on your back) can occur after ablation, usually coming about within the first 24-48hrs post ablation.  If this occurs and is severe enough to be troublesome to you, please call us and consider starting a course of ibuprofen 400mg three times daily for 5 to 7 days    Things to watch for  As with any type of procedure, please be more attentive to unusual symptoms post ablation (eg. fever, neurologic changes, pain with swallowing, loss of consciousness, etc) - we recommend ER evaluation for any such symptoms in the first few weeks post procedure.    Consider ER evaluation for the following:  Severe chest pain not relieved by Tylenol or Ibuprofen  You have chills or a fever greater than 101 F (38 C)  Neurologic changes (eg. leg, arm or face weakness or numbness, difficulties with speech or word finding, problems walking or with your balance, vision changes)  Severe difficulty swallowing and/or you are coughing up blood  Shortness of breath  Increased groin pain or a large or growing hard lump around the site  Groin is red, swollen, hot or tender  Blood or fluid is draining from the groin site  Any numbness, coolness or changes in color in your extremities  Groin pain not relieved by Tylenol or Advil  Recurrent SVT associated with sustained rapid heart rates or associated with additional concerning symptoms.    Our office will have a follow-up visit scheduled for you in approximately 6 weeks.  Please do not hesitate to call us before that time should issues arise.        [facility]    [phone]    If you are calling after hours, please listen to the entire voicemail,   a live  will answer at the end of the message.    [phone] to reach the EP nurses working with Dr Cobian

## 2023-03-16 NOTE — PROGRESS NOTES
Care Management Follow Up    Length of Stay (days): 3    Expected Discharge Date: 03/16/2023     Concerns to be Addressed: no discharge needs, hospital transfer    Patient plan of care discussed at interdisciplinary rounds: Yes    Anticipated Discharge Disposition:  transfer to [facility]     Anticipated Discharge Services: N/A  Anticipated Discharge DME: N/A    Patient/family educated on Medicare website which has current facility and service quality ratings: N/A  Education Provided on the Discharge Plan: N/A   Patient/Family in Agreement with the Plan: yes    Additional Information:  8:35 AM  Chart reviewed. Pt transferring to [facility] this morning for an ablation. Per notes, Pt has a ride scheduled this morning at 9:30 AM. No CM needs anticipated prior to Pt's transfer.    CM will sign off. Please contact CM if any additional needs arise.    JACEK Argueta

## 2023-03-16 NOTE — DISCHARGE SUMMARY
[facility]  Hospitalist Discharge Summary      Date of Admission:  3/13/2023  Date of Discharge:  No discharge date for patient encounter.  Discharging Provider: Chavo Mccullough MD  Discharge Service: Hospitalist Service    Discharge Diagnoses   Syncope, likely 2/2 to SVT  Mild hyponatremia  Lightheadedness that resolved    Follow-ups Needed After Discharge        Unresulted Labs Ordered in the Past 30 Days of this Admission     No orders found from 2/11/2023 to 3/14/2023.      These results will be followed up by [facility]    Discharge Disposition   Transferred to [facility] for ablation  Condition at discharge: Stable     Hospital Course    Anabelle Cameron is a 82 year old female admitted on 3/13/2023. She has a past medical history of hypertension, hyperlipidemia, reflux, depression, constipation, and previously established and known paroxysmal SVT.  She saw Dr. Hanley as an outpatient and in February had symptomatic SVT and was offered an ablation which she declined.  However, secondary to logistical issues, the patient had not continued taking a beta-blocker; on day of admission presented with symptomatic SVT and syncope, in ER with on and off SVT; given metoprolol and started on esmolol drip and rate controlled.      Admitted with cardiology consult and likely eventual transfer to [facility] for ablation.  CT did not show PE or dissection.  As part of syncope work-up, carotid ultrasound bilaterally obtained and nonacute, and echo with EF 50-55%.  During stay was hemodynamically and vitally stable.  Cardiology evaluated and transitioned her to metoprolol 25 mg twice daily though initially had some lightheadedness with starting the new medication. She later tolerated the metoprolol with better PO intake.  Cardiology was able to arrange transfer to [facility] for ablation (vs discharge and outpatient ablation) and after weighing risks and benefits, decision made with patient//daughter  (via phone) to pursue this.     As such, she will transfer to [facility] today for ablation. She is stable and has an ongoing mild hyponatremia. New medication of metoprolol bid on transfer and further cares are per cardiology.  She is scheduled to stay at [facility] and not return to our hospital.     Consultations This Hospital Stay   CARDIOLOGY IP CONSULT  CARE MANAGEMENT / SOCIAL WORK IP CONSULT    Code Status   Full Code    Time Spent on this Encounter   I, Chavo Mccullough MD, personally saw the patient today and spent greater than 30 minutes discharging this patient.       Chavo Mccullough MD  [facility]  [address] [address]  Phone: [phone]  Fax: [phone]  ______________________________________________________________________    Physical Exam   Vital Signs: Temp: 98.1  F (36.7  C) Temp src: Axillary BP: (!) 165/79 Pulse: 60   Resp: 20 SpO2: 95 % O2 Device: None (Room air)    Weight: 154 lbs 8.68 oz    General: alert, oriented and in no distress  HENT- wnl  Pulmonary: clear to auscultation bilaterally, normal respiratory effort, on room air, no rales or wheezes or evidence of accessory muscle use  CVS: regular rate and rhythm, no murmurs, rubs, or gallops; no blatant jugular venous distention; mild nonpitting ankle  edema and extremities remain warm to the touch  GI: soft, nontender, BS+, no rebound or guarding, no conspicuous organomegaly   Neuro: nonfocal, moves all extremities of own volition  Psych: appropriate       Primary Care Physician   Jenise August    Discharge Orders   No discharge procedures on file.    Significant Results and Procedures   Results for orders placed or performed during the hospital encounter of 03/13/23   CTA Chest Abdomen Pelvis w Contrast    Narrative    EXAM: CTA CHEST ABDOMEN PELVIS W CONTRAST  LOCATION: [facility]  DATE/TIME: 3/13/2023 2:29 PM    INDICATION: arrhythmia, unsteady on  feet  COMPARISON: CT pulmonary angiogram 01/01/2023  TECHNIQUE: CT angiogram chest abdomen pelvis during arterial phase of injection of IV contrast. 2D and 3D MIP reconstructions were performed by the CT technologist. Dose reduction techniques were used.   CONTRAST: Isovue 370 100mL    FINDINGS:   CT ANGIOGRAM CHEST, ABDOMEN, AND PELVIS: Pulsation artifact at the aortic root. No thoracic aortic aneurysm. 2 vessel arch anatomy. Mild mixed attenuation atheroma in the distal arch and descending thoracic aorta. Main pulmonary artery is normal caliber.   There are no pulmonary artery filling defects.    Mild mixed attenuation atheroma in the normal caliber abdominal aorta. Iliac and common femoral arteries are widely patent. Mesenteric and renal arteries are normal.    LUNGS AND PLEURA: Unchanged subsegmental sized opacity in the lateral segment right middle lobe abutting the anterolateral costal pleura consistent with focal atelectasis. Limited territories of subpleural groundglass are present in the posterior lower   lobes consistent with positional atelectasis. There is a pneumatocele in the medial right lower lobe. No new airspace opacities or lung nodules. Central airways are patent. No pleural effusion.    MEDIASTINUM: Cardiac chambers are normal in size. No pericardial effusion. Mild mitral annular calcifications. Thyroid gland is mildly heterogeneous. No enlarged mediastinal or hilar lymph nodes. A small hiatal hernia.    CORONARY ARTERY CALCIFICATION: Mild.    HEPATOBILIARY: Normal.    PANCREAS: Normal.    SPLEEN: Normal.    ADRENAL GLANDS: Normal.    KIDNEYS/BLADDER: Kidneys have normal size, cortex thickness and symmetric parenchymal enhancement. Prominent extrarenal pelves. No urinary tract calculi. Urinary bladder is normal.    BOWEL: Nondistended stomach. Normal caliber small bowel. Diverticula arise from the sigmoid colon. No bowel wall thickening or abnormal mucosal enhancement. No ascites.    LYMPH  NODES: Normal.    PELVIC ORGANS: Uterus is absent. Normal ovaries.    MUSCULOSKELETAL: Status post L4-S1 posterior decompression and fixation. Generalized demineralization of the bones. No aggressive or destructive bone lesions. Mild reciprocal thoracolumbar spinal curvature.      Impression    IMPRESSION:    1.  No acute pulmonary embolism or acute aortic syndrome.  2.  Small hiatal hernia.  3.  Mild sigmoid diverticulosis.     US Carotid Bilateral    Narrative    EXAM: US CAROTID BILATERAL  LOCATION: [facility]  DATE/TIME: 3/13/2023 5:24 PM    INDICATION: syncope  COMPARISON: None.  TECHNIQUE: Duplex exam performed utilizing 2D gray-scale imaging, Doppler interrogation with color-flow and spectral waveform analysis. The percent diameter stenosis is determined using NASCET criteria and Society of Radiologists in Ultrasound Consensus   Criteria.    FINDINGS:    RIGHT: Mild plaque at the bifurcation. The peak systolic velocity in the ICA is less than 125 cm/sec, consistent with less than 50% stenosis. Normal velocities in the ECA. Antegrade flow within the vertebral artery.     LEFT: Mild plaque at the bifurcation. The peak systolic velocity in the ICA is less than 125 cm/sec, consistent with less than 50% stenosis. Normal velocities in the ECA. Antegrade flow within the vertebral artery.    VELOCITY CHART:  CCA   Right: 73/17 cm/s   Left: 61/18 cm/s  ICA   Right: 100/28 cm/s   Left: 106/31 cm/s  ECA   Right: 57/6 cm/s   Left: 61/13 cm/s  ICA/CCA PSV Ratio   Right: 1.4   Left: 1.7      Impression    IMPRESSION:  1.  Mild plaque formation, velocities consistent with less than 50% stenosis in the right internal carotid artery.  2.  Mild plaque formation, velocities consistent with less than 50% stenosis in the left internal carotid artery.  3.  Flow within the vertebral arteries is antegrade.   Echocardiogram Complete with Bubble Study     Value    LVEF  50-55%    Narrative     [id number]  [id number]  [id number]^LLAMAS^OR[place]O^[place] [address]     Name: SHI LERMA  MRN: [id number]  : 1940  Study Date: 2023 02:57 PM  Age: 82 yrs  Gender: Female  Patient Location: [facility]  Reason For Study: Syncope  Ordering Physician: ORLANDO LLAMAS  Performed By: BARBARA/CONSUELO     BSA: 1.8 m2  Height: 66 in  Weight: 150 lb  HR: 79  BP: 128/95 mmHg  ______________________________________________________________________________  Procedure  Complete Portable Bubble Echo Adult.  ______________________________________________________________________________  Interpretation Summary     The right ventricle is normal size.  The right ventricular systolic function is normal.  The left ventricle is normal in size.  The visual ejection fraction is 50-55%.  No regional wall motion abnormalities noted.  IVC diameter <2.1 cm collapsing >50% with sniff suggests a normal RA pressure  of 3 mmHg.  Sinus rhythm was noted.  No hemodynamically significant valvular abnormalities on 2D or color flow  imaging.  ______________________________________________________________________________  Left Ventricle  The left ventricle is normal in size. Proximal septal thickening is noted.  Diastolic Doppler findings (E/E' ratio and/or other parameters) suggest left  ventricular filling pressures are indeterminate. The visual ejection fraction  is 50-55%. No regional wall motion abnormalities noted.     Right Ventricle  The right ventricle is normal size. TAPSE is normal, which is consistent with  normal right ventricular systolic function. The right ventricular systolic  function is normal.     Atria  The left atrium is mildly dilated. Right atrial size is normal. A contrast  injection (Bubble Study) was performed that was negative for flow across the  interatrial septum.     Mitral Valve  The mitral valve leaflets appear thickened, but open well. There is no mitral  regurgitation  noted. There is no mitral valve stenosis.     Tricuspid Valve  Tricuspid valve leaflets appear normal. There is mild (1+) tricuspid  regurgitation. There is no tricuspid stenosis.     Aortic Valve  Mild aortic valve calcification is present. The aortic valve is trileaflet. No  aortic regurgitation is present. No aortic stenosis is present.     Pulmonic Valve  The pulmonic valve is not well visualized. There is no pulmonic valvular  regurgitation. There is no pulmonic valvular stenosis.     Vessels  The aorta root is normal. Normal size ascending aorta. IVC diameter <2.1 cm  collapsing >50% with sniff suggests a normal RA pressure of 3 mmHg.     Pericardium  There is no pericardial effusion.     Rhythm  Sinus rhythm was noted.  ______________________________________________________________________________  MMode/2D Measurements & Calculations     IVSd: 1.3 cm  LVIDd: 3.2 cm  LVIDs: 2.3 cm  LVPWd: 0.88 cm  FS: 28.5 %  LV mass(C)d: 101.5 grams  LV mass(C)dI: 57.4 grams/m2  Ao root diam: 3.2 cm  LA dimension: 3.0 cm  asc Aorta Diam: 3.2 cm  LA/Ao: 0.93  LVOT diam: 2.1 cm  LVOT area: 3.5 cm2  LA Volume Indexed (AL/bp): 32.3 ml/m2  RWT: 0.55  TAPSE: 2.1 cm     Time Measurements  MM HR: 79.0 BPM     Doppler Measurements & Calculations  MV E max pj: 53.9 cm/sec  MV A max pj: 124.9 cm/sec  MV E/A: 0.43  MV max P.7 mmHg  MV mean P.3 mmHg  MV V2 VTI: 18.2 cm  MVA(VTI): 2.7 cm2  Ao V2 max: 84.7 cm/sec  Ao max PG: 3.0 mmHg  Ao V2 mean: 56.3 cm/sec  Ao mean P.5 mmHg  Ao V2 VTI: 16.2 cm  DAKOTA(I,D): 3.1 cm2  DAKOTA(V,D): 3.2 cm2  LV V1 max P.4 mmHg  LV V1 max: 76.7 cm/sec  LV V1 VTI: 14.1 cm  SV(LVOT): 49.7 ml  SI(LVOT): 28.1 ml/m2  PA acc time: 0.11 sec  AV Pj Ratio (DI): 0.91  DAKOTA Index (cm2/m2): 1.7  E/E': 14.2  E/E' avg: 15.2  Lateral E/e': 14.2     Medial E/e': 16.3  Peak E' Pj: 3.8 cm/sec  RV S Pj: 9.6 cm/sec     ______________________________________________________________________________  Report approved  by: Huma Post 03/13/2023 04:17 PM               Discharge Medications   Current Discharge Medication List      CONTINUE these medications which have NOT CHANGED    Details   acetaminophen (TYLENOL) 500 MG tablet Take 500-1,000 mg by mouth every 6 hours as needed for mild pain      benzonatate (TESSALON) 200 MG capsule Take 1 capsule (200 mg) by mouth 3 times daily as needed for cough  Qty: 20 capsule, Refills: 0    Associated Diagnoses: Essential hypertension      Calcium Carbonate-Vitamin D 600-5 MG-MCG CAPS Take 1 capsule by mouth daily      Cholecalciferol 10 MCG (400 UNIT) CAPS Take 400 Units by mouth daily      fluticasone (FLONASE) 50 MCG/ACT nasal spray Spray 1 spray into both nostrils daily as needed      LORazepam (ATIVAN) 0.5 MG tablet TAKE ONE TABLET BY MOUTH AT BEDTIME AS NEEDED FOR ANXIETY      omeprazole (PRILOSEC) 40 MG DR capsule Take 40 mg by mouth daily      polyethylene glycol (MIRALAX) 17 GM/Dose powder Take 17 g by mouth daily as needed for constipation  Qty: 510 g    Associated Diagnoses: Constipation, unspecified constipation type      pravastatin (PRAVACHOL) 10 MG tablet Take 10 mg by mouth At Bedtime      multivitamin, therapeutic with minerals (THERA-VIT-M) TABS Take 1 tablet by mouth daily.             Allergies   Allergies   Allergen Reactions     Levofloxacin Other (See Comments), Dizziness, Nausea and Unknown     dizziness  Patient unable to tolerate side effects    Patient unable to tolerate side effects  dizziness  Patient unable to tolerate side effects       Hydrocodone-Acetaminophen Other (See Comments) and Unknown     Dizziness, fainting  Other reaction(s): Syncope  Vasovagal episode      Vasovagal episode  Dizziness, fainting  Other reaction(s): Syncope  Vasovagal episode     Lisinopril Cough     Oxycodone-Acetaminophen Other (See Comments), Dizziness and Rash     Dizziness, fainting

## 2023-03-16 NOTE — PHARMACY-ADMISSION MEDICATION HISTORY
Admission medication history completed at [facility]. Please see Pharmacy - Admission Medication History note from 3/13/2023.    Pharmacy Note - Admission Medication History     Pertinent Provider Information: none      ______________________________________________________________________     Prior To Admission (PTA) med list completed and updated in EMR.              PTA Med List   Medication Sig Last Dose     acetaminophen (TYLENOL) 500 MG tablet Take 500-1,000 mg by mouth every 6 hours as needed for mild pain 3/12/2023     benzonatate (TESSALON) 200 MG capsule Take 1 capsule (200 mg) by mouth 3 times daily as needed for cough Past Week     Calcium Carbonate-Vitamin D 600-5 MG-MCG CAPS Take 1 capsule by mouth daily 3/13/2023     Cholecalciferol 10 MCG (400 UNIT) CAPS Take 400 Units by mouth daily 3/13/2023     fluticasone (FLONASE) 50 MCG/ACT nasal spray Spray 1 spray into both nostrils daily as needed Past Month     LORazepam (ATIVAN) 0.5 MG tablet TAKE ONE TABLET BY MOUTH AT BEDTIME AS NEEDED FOR ANXIETY 3/13/2023     omeprazole (PRILOSEC) 40 MG DR capsule Take 40 mg by mouth daily 3/13/2023     polyethylene glycol (MIRALAX) 17 GM/Dose powder Take 17 g by mouth daily as needed for constipation Past Week     pravastatin (PRAVACHOL) 10 MG tablet Take 10 mg by mouth At Bedtime 3/12/2023        Information source(s): Patient and [facility]  Method of interview communication: in-person     Summary of Changes to PTA Med List  ni changes    Patient was asked about OTC/herbal products specifically.  PTA med list reflects this.     In the past week, patient estimated taking medication this percent of the time:  greater than 90%.     Medication Affordability:        Allergies were reviewed, assessed, and updated with the patient.       Patient does not anticipate needing any multi-use medications during admission.     The information provided in this note is only as accurate as  the sources available at the time of the update(s).     Thank you for the opportunity to participate in the care of this patient.     Porter Stoner [facility]  3/13/2023 1:44 PM

## 2023-03-16 NOTE — ANESTHESIA POSTPROCEDURE EVALUATION
Patient: Anabelle Cameron    Procedure: Procedure(s):  Comprehensive Study  Ablation Supraventricular Tachycardia       Anesthesia Type:  MAC    Note:  Disposition: Admission   Postop Pain Control: Uneventful            Sign Out: Well controlled pain   PONV: No   Neuro/Psych: Uneventful            Sign Out: Acceptable/Baseline neuro status   Airway/Respiratory: Uneventful            Sign Out: Acceptable/Baseline resp. status   CV/Hemodynamics: Uneventful            Sign Out: Acceptable CV status; No obvious hypovolemia; No obvious fluid overload   Other NRE: NONE   DID A NON-ROUTINE EVENT OCCUR?            Last vitals:  Vitals:    03/16/23 1158 03/16/23 1606 03/16/23 1615   BP: (!) 170/78 (!) 176/82 (!) 161/74   Pulse: 70 85 82   Resp:  18    Temp:  36.5  C (97.7  F)    SpO2: 99% 92% 95%       Electronically Signed By: Nilton Velázquez MD  March 16, 2023  4:37 PM

## 2023-03-16 NOTE — PLAN OF CARE
Goal Outcome Evaluation:  Problem: Dysrhythmia  Goal: Normalized Cardiac Rhythm  Outcome: Adequate for Care Transition     Problem: Fatigue  Goal: Improved Activity Tolerance  Outcome: Adequate for Care Transition  Intervention: Promote Improved Energy    Problem: Plan of Care - These are the overarching goals to be used throughout the patient stay.    Goal: Optimal Comfort and Wellbeing  Outcome: Adequate for Care Transition  Intervention: Provide Person-Centered Care    Patient A&O x4, VSS. NSR on tele. LS clear. Denies pain, SOB or H/N/V. Pt aware of plan today to go to [facility] this afternoon for a procedure. Ride arranged for pick this afternoon. Continent bladder and bowel. Has been NPO since midnight. Ambulates with SBA and walker. Able to make needs known and uses call light appropriately.

## 2023-03-16 NOTE — PLAN OF CARE
Goal Outcome Evaluation:      Plan of Care Reviewed With: patient    Overall Patient Progress: improvingOverall Patient Progress: improving    Outcome Evaluation: Assumed care [id number]. In bed, AOx4, on RA. NSR on tele. C/o neck soreness, and requested for APAP this shift. Had a partial bath this shift. Pt aware of planned ablation tomorrow at [place], ride scheduled for 0930. NPO at midnight.

## 2023-03-16 NOTE — H&P
[facility]    History and Physical - Hospitalist Service       Date of Admission:  3/16/2023    Assessment & Plan      Anabelle Cameron is a 82 year old femalewith past medical history of hypertension, hyperlipidemia, GERD, depression, constipation, admitted to [facility] with symptomatic SVT and subsequently transferred to [facility] for SVT ablation     1.  Symptomatic SVT.  S/p AVR NT ablation today.  Management per cardiology  2.  Hyponatremia.  Chronic, serum sodium levels 128 - 135.  We will recheck tomorrow.  Can follow-up as an outpatient  3.  GERD on PPI  4.  Hyperlipidemia on statin       Diet: Advance Diet as Tolerated: Fully Advanced to diet(s) per Provider order    DVT Prophylaxis: Low Risk/Ambulatory with no VTE prophylaxis indicated  Guillory Catheter: Not present  Lines: None     Cardiac Monitoring: ACTIVE order. Indication: Post- EP procedure (48 hours)  Code Status: Full Code      Clinically Significant Risk Factors Present on Admission                               Disposition Plan      Expected Discharge Date: 03/18/2023                  Denise Evans MD  Hospitalist Service  [facility]  Securely message with [employer] (more info)  Text page via [employer] Paging/Directory     ______________________________________________________________________    Chief Complaint       History is obtained from the patient and medical chart    History of Present Illness   Anabelle Cameron is a 82 year old female with past medical history of hypertension, hyperlipidemia, GERD, depression, constipation, SVT who was admitted to [facility] on 3/13/2023 with symptomatic SVT and syncope.  She was initially on esmolol drip and subsequently transitioned to oral metoprolol.  She was transferred to [facility] today and underwent AVNRT.    Patient states she feels more alert.  Has some groin discomfort and left chest wall discomfort.  No dyspnea.  No  nausea.      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 LEFT EYE Occurred at time of IOL/ERM       Past Surgical History   Past Surgical History:   Procedure Laterality Date     APPENDECTOMY OPEN       GYN SURGERY       ORTHOPEDIC SURGERY         Prior to Admission Medications   Prior to Admission Medications   Prescriptions Last Dose Informant Patient Reported? Taking?   Calcium Carbonate-Vitamin D 600-5 MG-MCG CAPS   Yes No   Sig: Take 1 capsule by mouth daily   Cholecalciferol 10 MCG (400 UNIT) CAPS   Yes No   Sig: Take 400 Units by mouth daily   LORazepam (ATIVAN) 0.5 MG tablet   Yes No   Sig: TAKE ONE TABLET BY MOUTH AT BEDTIME AS NEEDED FOR ANXIETY   acetaminophen (TYLENOL) 500 MG tablet   Yes No   Sig: Take 500-1,000 mg by mouth every 6 hours as needed for mild pain   benzonatate (TESSALON) 200 MG capsule   No No   Sig: Take 1 capsule (200 mg) by mouth 3 times daily as needed for cough   fluticasone (FLONASE) 50 MCG/ACT nasal spray   Yes No   Sig: Spray 1 spray into both nostrils daily as needed   multivitamin, therapeutic with minerals (THERA-VIT-M) TABS   Yes No   Sig: Take 1 tablet by mouth daily.     omeprazole (PRILOSEC) 40 MG DR capsule   Yes No   Sig: Take 40 mg by mouth daily   polyethylene glycol (MIRALAX) 17 GM/Dose powder   No No   Sig: Take 17 g by mouth daily as needed for constipation   pravastatin (PRAVACHOL) 10 MG tablet   Yes No   Sig: Take 10 mg by mouth At Bedtime      Facility-Administered Medications:  None        Review of Systems    The 10 point Review of Systems is negative other than noted in the HPI or here.     Social History   I have reviewed this patient's social history and updated it with pertinent information if needed.  Social History     Tobacco Use     Smoking status: Former     Types: Cigarettes     Quit date: 1973     Years since quittin.2     Smokeless tobacco: Never   Vaping Use     Vaping Use: Never used   Substance Use Topics     Alcohol use: Never     Comment: social     Drug use: No        Physical Exam   Vital Signs: Temp: 97.7  F (36.5  C) Temp src: Oral BP: (!) 149/84 Pulse: 81   Resp: 18 SpO2: 97 % O2 Device: None (Room air)    Weight: 0 lbs 0 oz    General: No acute distress  CV: Regular rate and rhythm.  Normal S1 and S2  Lungs: Clear anteriorly      Medical Decision Making       60 MINUTES SPENT BY ME on the date of service doing chart review, history, exam, documentation & further activities per the note.  MANAGEMENT DISCUSSED with the following over the past 24 hours: patient and RN        Data         Imaging results reviewed over the past 24 hrs:   Recent Results (from the past 24 hour(s))   EP Ablation    Narrative    Table formatting from the original result was not included.  Images from the original result were not included.      [facility]  Cardiac Electrophysiology  [address]  [place] [address]   Office: [phone]  Fax: [phone]      [facility] [place]  ELECTROPHYSIOLOGY LABORATORY PROCEDURE REPORT   EP STUDY AND SVT ABLATION    Patient: Rakesh TERRY     Date of Procedure: 23     Summary:   1. Ablation for typical AVNRT (slow pathway ablation)     Recommendations:   1. Strict bedrest x4 hours.  2.   Medical management: Continue home meds  3. Monitor on telemetry.  4. Outpatient follow-up with  Cardiac Electrophysiology Clinic in about 6   weeks.     Pre-Operative Diagnosis:   1. Symptomatic SVT      Post-Operative Diagnosis:   1. Typical AVNRT     Procedures:   1.  SVT ablation and EP study(39628)  2.  Isoproterenol (82230)     Indication(s):   1. Symptomatic SVT     Anesthesia:  Monitored anesthesia care        Medications:   1. Isoproterenol upto 4 mcg/min         Complications: None      EP Attending: Aime Cobian MD     Description  After the risks and benefits of the procedure were explained and informed   consent was obtained, the patient was brought to the electrophysiology lab   in the unsedated and fasting state.  He was connected to the EP lab   monitoring system.  Moderate conscious sedation was obtained with divided   doses of IV versed and IV fentanyl with sedation monitoring by the EP Lab   RNs.  The right and left groins were prepared and draped in a sterile   fashion.  Local anesthesia with lidocaine was infiltrated into the right   groin.  Using the modified Seldinger technique with ultrasound guidance,   the following sheaths and catheters were inserted:     1.  An 8 F in the right femoral vein; a long 9 F and 7F in the left   femoral vein.    2. A fixed 5F quadripolar catheter was inserted through the 7 F sheath and   advanced to the right ventricular (RV) apex for recording and pacing RV.  3.  A 6 F deflectable, quadripolar catheter was inserted through the 8 F   sheath in the right femoral vein and advanced to the region of the His   bundle for recording.  This catheter was later removed to allow insertion   of the  ablation catheter.  4.  A 7 F deflectable, decapolar catheter was inserted through the 7 F   sheath in the left femoral vein and advanced into the coronary sinus for   recording and pacing from the left atrium (LA) via the coronary sinus   (CS).  5.  A D/F curve (standard RF) was inserted through the 8 F sheath in the   right femoral vein and advanced to the slow pathway region for mapping and   ablation.  This catheter was also used for 3 dimensional mapping and    mapping the His cloud, fast and slow pathway conduction.      The patient arrived in the electrophysiology laboratory in sinus rhythm.      A diagnostic EP study was performed in the baseline state.  Baseline   intervals were recorded.  Pacing and recording was performed from the   atrium via the coronary sinus, and the right ventricle.  Sinus node   recovery times were determined at three different paced cycle lengths.    Decremental (burst) atrial (via the CS) pacing was used to determine the   Wenckebach (AV block) cycle length (CL), and decremental right ventricular   pacing was used to determine the VA block CL.  Retrograde atrial   activation was concentric and decremental.  Premature extrastimulation in   the atrium was used to determine the atrial and AV marshall ERP and for   attempted induction of arrhythmia. Overdrive atrial pacing via the CS and   RA was also performed for arrhythmia induction.     At baseline, dual AV node physiology was seen.  AVNRT was not inducible at   baseline. No evidence of anterograde or retrograde AP conduction was seen.         Intravenous isoproterenol infusion up to 4 mcg/min was started to   facilitate AV marshall conduction and inducibility of AVNRT or other   potential arrhythmias.  Programmed stimulation and burst pacing were   repeated.  With isuprel, there was dual AV node physiology. Despite   isuprel, SVT could not be induced.       The ablation catheter was used to create an electroanatomical three   dimensional map of the RA and CS and to map the fast and slow pathway   conduction during sinus rhythm.  A cloud of points was marked on the   mapping system at the region of the His bundle to help guide ablation.       Given documented short RP SVT, dual AV node physiology, proceeded with   slow pathway ablation.  The His catheter was removed and the ablation   catheter was inserted into the 8 F sheath and was advanced into the slow   pathway region of the triangle of Wharton  using fluoroscopy and the 3-D   mapping system.     The atrial and ventricular electrograms were mapped in the slow pathway   region until an adequate V:A ratio was achieved.  Ablation at this site   produced frequent junctional beats with intact VA conduction.  Post   ablation, slow pathway conduction was not seen and AVNRT remained   non-inducible.     Maps    Yellow dost show His and Red dots show ablation lesions               Post ablation, EP testing was repeated at baseline with isuprel.    Intravenous isoproterenol infusion up to 4 mcg/min was started to   facilitate AV marshall conduction and inducibility of AVNRT or other   potential arrhythmias.  Programmed stimulation and burst pacing were   repeated.  With isuprel, no slow pathway conduction or inducible AVNRT was   seen.  During isuprel washout, there was no slow pathway conduction or   inducible AVNRT.     Catheters and venous sheaths were removed and hemostasis was obtained by a   subcutaneous figure of 8 stitch (b/l groin and manual pressure.  The   patient was allowed to awake from sedation and then transferred to CPRU   for post procedural monitoring.         Electrophysiology Study Data     ECG (baseline):              sinus rhythm, normal intervals  ECG (postablation):        sinus rhythm, normal intervals     Intervals     Interval    Baseline   Rhythm Sinus      AH 66   HV 53      QRS 79           Stimulation measurements       Baseline     AV block (Wenckebach) cycle length:  320 ms ; decremental AV conduction     Atrial ERP (AERP):   240 ms @ 600 ms      VA conduction, VA Wenckebach CL: 540 ms;  decremental, concentric VA   conduction                  VERP: 400 ms @ 600 ms     Isuprel (2-4 mcg/min)                  AV block (Wenckebach) cycle length:  240 ms                   Atrial ERP (AERP):   270 ms @ 500 ms       VA conduction, VA Wenckebach CL: 310 ms; decremental, concentric VA   conduction    VERP: 250 ms @ 600 ms                     Post Ablation                 Baseline     AV block (Wenckebach) cycle length:  370 ms; decremental AV conduction     AV Node ERP (AVNERP): 320 ms @ 600 ms      VA dissociation was seen at baseline post ablation                    Isuprel (2 mcg/min)                  AV block (Wenckebach) cycle length:  310 ms      Atrial ERP (AERP): : 230 ms @ 600 ms      VA conduction, VA Wenckebach CL: 350 ms; decremental, concentric VA   conduction    VERP 210 ms @ 500 ms     Post ablation, no slow pathway conduction seen, no inducible AVNRT at   baseline or with isuprel        Arrhythmias:  none                 Atrial & Ventricular Programmed Stimulation (postablation)               Overdrive atrial & ventricular pacing, Premature   extrastimulation:  No inducible AVNRT.     Contrast  none     Complications  none     Specimen  none     Estimated blood loss  < 10 cc       EP Study    Narrative    Table formatting from the original result was not included.  Images from the original result were not included.      [facility]  Cardiac Electrophysiology  [address]  [place] [address]   Office: [phone]  Fax: [phone]      [facility]  ELECTROPHYSIOLOGY LABORATORY PROCEDURE REPORT   EP STUDY AND SVT ABLATION    Patient: Rakesh TERRY     Date of Procedure: 03/16/23     Summary:   1. Ablation for typical AVNRT (slow pathway ablation)     Recommendations:   1. Strict bedrest x4 hours.  2.   Medical management: Continue home meds  3. Monitor on telemetry.  4. Outpatient follow-up with  Cardiac Electrophysiology Clinic in about 6   weeks.     Pre-Operative Diagnosis:   1. Symptomatic SVT     Post-Operative Diagnosis:   1. Typical AVNRT     Procedures:   1.  SVT ablation and EP study(75031)  2.  Isoproterenol (51524)     Indication(s):   1. Symptomatic SVT     Anesthesia:  Monitored anesthesia care        Medications:   1. Isoproterenol upto 4 mcg/min          Complications: None      EP Attending: Aime Cobian MD     Description  After the risks and benefits of the procedure were explained and informed   consent was obtained, the patient was brought to the electrophysiology lab   in the unsedated and fasting state.  He was connected to the EP lab   monitoring system.  Moderate conscious sedation was obtained with divided   doses of IV versed and IV fentanyl with sedation monitoring by the EP Lab   RNs.  The right and left groins were prepared and draped in a sterile   fashion.  Local anesthesia with lidocaine was infiltrated into the right   groin.  Using the modified Seldinger technique with ultrasound guidance,   the following sheaths and catheters were inserted:     1.  An 8 F in the right femoral vein; a long 9 F and 7F in the left   femoral vein.    2. A fixed 5F quadripolar catheter was inserted through the 7 F sheath and   advanced to the right ventricular (RV) apex for recording and pacing RV.  3.  A 6 F deflectable, quadripolar catheter was inserted through the 8 F   sheath in the right femoral vein and advanced to the region of the His   bundle for recording.  This catheter was later removed to allow insertion   of the  ablation catheter.  4.  A 7 F deflectable, decapolar catheter was inserted through the 7 F   sheath in the left femoral vein and advanced into the coronary sinus for   recording and pacing from the left atrium (LA) via the coronary sinus   (CS).  5.  A D/F curve (standard RF) was inserted through the 8 F sheath in the   right femoral vein and advanced to the slow pathway region for mapping and   ablation.  This catheter was also used for 3 dimensional mapping and   mapping the His cloud, fast and slow pathway conduction.      The patient arrived in the electrophysiology laboratory in sinus rhythm.      A diagnostic EP study was performed in the baseline state.  Baseline   intervals were recorded.  Pacing and recording was performed from  the   atrium via the coronary sinus, and the right ventricle.  Sinus node   recovery times were determined at three different paced cycle lengths.    Decremental (burst) atrial (via the CS) pacing was used to determine the   Wenckebach (AV block) cycle length (CL), and decremental right ventricular   pacing was used to determine the VA block CL.  Retrograde atrial   activation was concentric and decremental.  Premature extrastimulation in   the atrium was used to determine the atrial and AV marshall ERP and for   attempted induction of arrhythmia. Overdrive atrial pacing via the CS and   RA was also performed for arrhythmia induction.     At baseline, dual AV node physiology was seen.  AVNRT was not inducible at   baseline. No evidence of anterograde or retrograde AP conduction was seen.         Intravenous isoproterenol infusion up to 4 mcg/min was started to   facilitate AV marshall conduction and inducibility of AVNRT or other   potential arrhythmias.  Programmed stimulation and burst pacing were   repeated.  With isuprel, there was dual AV node physiology. Despite   isuprel, SVT could not be induced.       The ablation catheter was used to create an electroanatomical three   dimensional map of the RA and CS and to map the fast and slow pathway   conduction during sinus rhythm.  A cloud of points was marked on the   mapping system at the region of the His bundle to help guide ablation.       Given documented short RP SVT, dual AV node physiology, proceeded with   slow pathway ablation.  The His catheter was removed and the ablation   catheter was inserted into the 8 F sheath and was advanced into the slow   pathway region of the triangle of Wharton using fluoroscopy and the 3-D   mapping system.     The atrial and ventricular electrograms were mapped in the slow pathway   region until an adequate V:A ratio was achieved.  Ablation at this site   produced frequent junctional beats with intact VA conduction.  Post    ablation, slow pathway conduction was not seen and AVNRT remained   non-inducible.     Maps    Yellow dost show His and Red dots show ablation lesions               Post ablation, EP testing was repeated at baseline with isuprel.    Intravenous isoproterenol infusion up to 4 mcg/min was started to   facilitate AV marshall conduction and inducibility of AVNRT or other   potential arrhythmias.  Programmed stimulation and burst pacing were   repeated.  With isuprel, no slow pathway conduction or inducible AVNRT was   seen.  During isuprel washout, there was no slow pathway conduction or   inducible AVNRT.     Catheters and venous sheaths were removed and hemostasis was obtained by a   subcutaneous figure of 8 stitch (b/l groin and manual pressure.  The   patient was allowed to awake from sedation and then transferred to CPRU   for post procedural monitoring.         Electrophysiology Study Data     ECG (baseline):              sinus rhythm, normal intervals  ECG (postablation):        sinus rhythm, normal intervals     Intervals     Interval    Baseline   Rhythm Sinus      AH 66   HV 53      QRS 79           Stimulation measurements       Baseline     AV block (Wenckebach) cycle length:  320 ms ; decremental AV conduction     Atrial ERP (AERP):   240 ms @ 600 ms      VA conduction, VA Wenckebach CL: 540 ms;  decremental, concentric VA   conduction                  VERP: 400 ms @ 600 ms     Isuprel (2-4 mcg/min)                  AV block (Wenckebach) cycle length:  240 ms                   Atrial ERP (AERP):   270 ms @ 500 ms       VA conduction, VA Wenckebach CL: 310 ms; decremental, concentric VA   conduction    VERP: 250 ms @ 600 ms                    Post Ablation                 Baseline     AV block (Wenckebach) cycle length:  370 ms; decremental AV conduction     AV Node ERP (AVNERP): 320 ms @ 600 ms      VA dissociation was seen at baseline post ablation                    Isuprel (2 mcg/min)                   AV block (Wenckebach) cycle length:  310 ms      Atrial ERP (AERP): : 230 ms @ 600 ms      VA conduction, VA Wenckebach CL: 350 ms; decremental, concentric VA   conduction    VERP 210 ms @ 500 ms     Post ablation, no slow pathway conduction seen, no inducible AVNRT at   baseline or with isuprel        Arrhythmias:  none                 Atrial & Ventricular Programmed Stimulation (postablation)               Overdrive atrial & ventricular pacing, Premature   extrastimulation:  No inducible AVNRT.     Contrast  none     Complications  none     Specimen  none     Estimated blood loss  < 10 cc

## 2023-03-16 NOTE — PLAN OF CARE
Pt is A&Ox4. Pt rated pain 0-4/10 during shift thus far. Managed with prn tylenol, repositioning, and rest. SBA with walker for transferring. Saline locked. Voiding adequately with purewick in place. Education on medication administration, alarms, and use of call-light to reduce risk for falls and injury. No further issues noted. CMS intact. VSS, no shortness of breath or nausea. Tele NSR. NPO since midnight for ablation. Ride at 9:30AM.

## 2023-03-16 NOTE — ANESTHESIA CARE TRANSFER NOTE
Patient: Anabelle Cameron    Procedure: Procedure(s):  Comprehensive Study  Ablation Supraventricular Tachycardia       Diagnosis: SVT  Diagnosis Additional Information: No value filed.    Anesthesia Type:   MAC     Note:    Oropharynx: oropharynx clear of all foreign objects  Level of Consciousness: awake  Oxygen Supplementation: room air    Independent Airway: airway patency satisfactory and stable  Dentition: dentition unchanged  Vital Signs Stable: post-procedure vital signs reviewed and stable  Report to RN Given: handoff report given  Patient transferred to: Telemetry/Step Down Unit    Handoff Report: Identifed the Patient, Identified the Reponsible Provider, Reviewed the pertinent medical history, Discussed the surgical course, Reviewed Intra-OP anesthesia mangement and issues during anesthesia, Set expectations for post-procedure period and Allowed opportunity for questions and acknowledgement of understanding      Vitals:  Vitals Value Taken Time   /82 03/16/23 1606   Temp 36.5  C (97.7  F) 03/16/23 1606   Pulse 83 03/16/23 1610   Resp 18 03/16/23 1606   SpO2 93 % 03/16/23 1610   Vitals shown include unvalidated device data.    Electronically Signed By: CARLOTA Engle CRNA  March 16, 2023  4:12 PM

## 2023-03-16 NOTE — Clinical Note
Potential access sites were evaluated for patency using ultrasound.   The right and left femoral veins were selected. Access was obtained under with Sonosite guidance using a micropuncture 21 gauge needle with direct visualization of needle entry.

## 2023-03-17 VITALS
SYSTOLIC BLOOD PRESSURE: 110 MMHG | BODY MASS INDEX: 24.16 KG/M2 | TEMPERATURE: 98 F | WEIGHT: 149.7 LBS | HEART RATE: 62 BPM | RESPIRATION RATE: 18 BRPM | OXYGEN SATURATION: 98 % | DIASTOLIC BLOOD PRESSURE: 59 MMHG

## 2023-03-17 DIAGNOSIS — I47.10 PAROXYSMAL SVT (SUPRAVENTRICULAR TACHYCARDIA) (H): Primary | ICD-10-CM

## 2023-03-17 LAB
ANION GAP SERPL CALCULATED.3IONS-SCNC: 7 MMOL/L (ref 7–15)
BUN SERPL-MCNC: 14.3 MG/DL (ref 8–23)
CALCIUM SERPL-MCNC: 8.9 MG/DL (ref 8.8–10.2)
CHLORIDE SERPL-SCNC: 103 MMOL/L (ref 98–107)
CREAT SERPL-MCNC: 0.6 MG/DL (ref 0.51–0.95)
DEPRECATED HCO3 PLAS-SCNC: 22 MMOL/L (ref 22–29)
GFR SERPL CREATININE-BSD FRML MDRD: 88 ML/MIN/1.73M2
GLUCOSE SERPL-MCNC: 91 MG/DL (ref 70–99)
HOLD SPECIMEN: NORMAL
POTASSIUM SERPL-SCNC: 3.9 MMOL/L (ref 3.4–5.3)
SODIUM SERPL-SCNC: 132 MMOL/L (ref 136–145)

## 2023-03-17 PROCEDURE — 36415 COLL VENOUS BLD VENIPUNCTURE: CPT | Performed by: INTERNAL MEDICINE

## 2023-03-17 PROCEDURE — 99232 SBSQ HOSP IP/OBS MODERATE 35: CPT | Performed by: INTERNAL MEDICINE

## 2023-03-17 PROCEDURE — 99232 SBSQ HOSP IP/OBS MODERATE 35: CPT | Performed by: HOSPITALIST

## 2023-03-17 PROCEDURE — 250N000013 HC RX MED GY IP 250 OP 250 PS 637: Performed by: STUDENT IN AN ORGANIZED HEALTH CARE EDUCATION/TRAINING PROGRAM

## 2023-03-17 PROCEDURE — 82310 ASSAY OF CALCIUM: CPT | Performed by: INTERNAL MEDICINE

## 2023-03-17 RX ORDER — METOPROLOL TARTRATE 25 MG/1
25 TABLET, FILM COATED ORAL 2 TIMES DAILY
Qty: 60 TABLET | Refills: 0 | Status: SHIPPED | OUTPATIENT
Start: 2023-03-17 | End: 2023-07-03

## 2023-03-17 RX ADMIN — PANTOPRAZOLE SODIUM 40 MG: 40 TABLET, DELAYED RELEASE ORAL at 07:55

## 2023-03-17 RX ADMIN — METOPROLOL TARTRATE 25 MG: 25 TABLET, FILM COATED ORAL at 07:55

## 2023-03-17 ASSESSMENT — ACTIVITIES OF DAILY LIVING (ADL)
ADLS_ACUITY_SCORE: 33

## 2023-03-17 NOTE — PROGRESS NOTES
Tiera Shearer, CNP  [facility] Ep Support Pool - [facility]  Hello and Happy Friday!     This patient is s/p EP ablation and needs 6 week follow up. Thank you!     -Diana

## 2023-03-17 NOTE — PLAN OF CARE
"  Problem: Plan of Care - These are the overarching goals to be used throughout the patient stay.    Goal: Plan of Care Review  Description: The Plan of Care Review/Shift note should be completed every shift.  The Outcome Evaluation is a brief statement about your assessment that the patient is improving, declining, or no change.  This information will be displayed automatically on your shift note.  Outcome: Progressing  Goal: Patient-Specific Goal (Individualized)  Description: You can add care plan individualizations to a care plan. Examples of Individualization might be:  \"Parent requests to be called daily at 9am for status\", \"I have a hard time hearing out of my right ear\", or \"Do not touch me to wake me up as it startles me\".  Outcome: Progressing  Goal: Absence of Hospital-Acquired Illness or Injury  Outcome: Progressing  Goal: Optimal Comfort and Wellbeing  Outcome: Progressing  Goal: Readiness for Transition of Care  Outcome: Progressing     Problem: Risk for Delirium  Goal: Optimal Coping  Outcome: Progressing  Goal: Improved Behavioral Control  Outcome: Progressing  Goal: Improved Attention and Thought Clarity  Outcome: Progressing  Goal: Improved Sleep  Outcome: Progressing   Goal Outcome Evaluation:       VS stable. She denies any sob and chest pain.  Bilateral Ablation site is soft,clean dry and intact.  She ha been up in chair and walking independent the room.Pt discharged to home and discharge instruction education is done. She denies nay question and concerns about discharge . All belongs send with her. She transported by w/c.                  "

## 2023-03-17 NOTE — DISCHARGE SUMMARY
[facility] MEDICINE  DISCHARGE SUMMARY     Primary Care Physician: Jenise Christensen  Admission Date: 3/16/2023   Discharge Provider: Savita Wilkins MD Discharge Date: 3/17/2023   Diet: Regular   Code Status: Full Code   Activity: DCACTIVITY: Activity as tolerated per instructions        Condition at Discharge: Stable     REASON FOR PRESENTATION(See Admission Note for Details)   Symptomatic SVT     PRINCIPAL & ACTIVE DISCHARGE DIAGNOSES     Principal Problem:    SVT (supraventricular tachycardia) (H)      PENDING LABS     Unresulted Labs Ordered in the Past 30 Days of this Admission     No orders found for last 31 day(s).            PROCEDURES ( this hospitalization only)      Procedure(s):  Comprehensive Study  Ablation Supraventricular Tachycardia    RECOMMENDATIONS TO OUTPATIENT PROVIDER FOR F/U VISIT     Follow-up Appointments     Follow-up and recommended labs and tests       Follow up with primary care provider, Jenise Christensen, within 7 days to   evaluate medication change and for hospital follow- up.    Follow up with Cardiology                 DISPOSITION     Home    SUMMARY OF HOSPITAL COURSE:      Anabelle Cameron is a 82 year old femalewith past medical history of hypertension, hyperlipidemia, GERD, depression, constipation, admitted to [facility] with symptomatic SVT and subsequently transferred to [facility] for SVT ablation.      Symptomatic SVT  - Cardiology consulted  - S/p AVR NT ablation Mar 16  - Continue Lopressor  - Follow up with cardiology as outpatient     Hyponatremia, Chronic  - Serum sodium levels 128 - 135  - Can be followed up as outpatient. Discussed w/ patient.     GERD   - Cont PPI     Hyperlipidemia   - Cont statin       Discharge Medications with Med changes:     Discharge Medication List as of 3/17/2023 12:35 PM      START taking these medications    Details   metoprolol tartrate (LOPRESSOR) 25 MG tablet Take 1 tablet (25 mg) by mouth 2  times daily for 30 days, Disp-60 tablet, R-0, E-Prescribe         CONTINUE these medications which have NOT CHANGED    Details   acetaminophen (TYLENOL) 500 MG tablet Take 500-1,000 mg by mouth every 6 hours as needed for mild pain, Historical      benzonatate (TESSALON) 200 MG capsule Take 1 capsule (200 mg) by mouth 3 times daily as needed for cough, Disp-20 capsule, R-0, E-Prescribe      Calcium Carbonate-Vitamin D 600-5 MG-MCG CAPS Take 1 capsule by mouth daily, Historical      Cholecalciferol 10 MCG (400 UNIT) CAPS Take 400 Units by mouth daily, Historical      fluticasone (FLONASE) 50 MCG/ACT nasal spray Spray 1 spray into both nostrils daily as needed, Historical      LORazepam (ATIVAN) 0.5 MG tablet TAKE ONE TABLET BY MOUTH AT BEDTIME AS NEEDED FOR ANXIETY, Historical      multivitamin, therapeutic with minerals (THERA-VIT-M) TABS Take 1 tablet by mouth daily.  , 1 tablet, Oral, DAILY, Until Discontinued, Historical      omeprazole (PRILOSEC) 40 MG DR capsule Take 40 mg by mouth daily, Historical      polyethylene glycol (MIRALAX) 17 GM/Dose powder Take 17 g by mouth daily as needed for constipation, Disp-510 g, OTC      pravastatin (PRAVACHOL) 10 MG tablet Take 10 mg by mouth At Bedtime, Historical                 Consults   - Cardiology      Immunizations given this encounter     Most Recent Immunizations   Administered Date(s) Administered     COVID-19 Vaccine 12+ (Pfizer 2022) 05/29/2022     COVID-19 Vaccine 12+ (Pfizer) 12/18/2021     FLU 6-35 months 09/22/2010     FLUAD(HD)65+ QUAD 09/17/2021     Flu 65+ Years 09/26/2019     Flu, Unspecified 10/15/2021     Influenza (IIV3) PF 10/20/2010     Influenza Vaccine 65+ (Fluzone HD) 02/10/2023     Pneumo Conj 13-V (2010&after) 12/03/2015     Pneumococcal 23 valent 11/15/2013     Td (Adult), Adsorbed 01/01/1998     Tdap (Adacel,Boostrix) 11/15/2013     Zoster vaccine recombinant adjuvanted (SHINGRIX) 12/03/2020           Anticoagulation Information      Recent  INR results:   Recent Labs   Lab 03/13/23  1315   INR 1.00     Warfarin doses (if applicable) or name of other anticoagulant:       SIGNIFICANT IMAGING FINDINGS     No results found for this visit on 03/16/23.    SIGNIFICANT LABORATORY FINDINGS     Most Recent 3 CBC's:Recent Labs   Lab Test 03/14/23  0445 03/13/23  1315 01/01/23  1506   WBC 6.5 10.2 5.4   HGB 12.0 12.7 12.4   MCV 93 93 90    318 234     Most Recent 3 BMP's:Recent Labs   Lab Test 03/17/23  0443 03/15/23  0449 03/14/23  0445 03/13/23  1315   * 133* 135* 138   POTASSIUM 3.9  --  4.0 4.0   CHLORIDE 103  --  108* 104   CO2 22  --  22 24   BUN 14.3  --  15 15   CR 0.60 0.65 0.60 0.65   ANIONGAP 7  --  5 10   KINJAL 8.9  --  8.7 9.3   GLC 91  --  93 93         Discharge Orders        Reason for your hospital stay    SVT     Follow-up and recommended labs and tests     Follow up with primary care provider, Jenise Christensen, within 7 days to evaluate medication change and for hospital follow- up.    Follow up with Cardiology     Activity    Your activity upon discharge: activity as tolerated per instructions     Diet    Follow this diet upon discharge: Orders Placed This Encounter      Advance Diet as Tolerated: Regular Diet Adult       Examination   General: In NAD  HEENT: NCAT & EOMI  CV: Normal S1S2, regular rhythm, normal rate  Lungs: CTAB  Abdomen: Soft, NT, ND, +BS  Extremities:  B/L groin sites appear appropriate. No LE edema b/l  Neuro: AAOx3      Please see EMR for more detailed significant labs, imaging, consultant notes etc.    Savita GRAHAM MD, personally saw the patient today and spent greater than 30 minutes discharging this patient.    Savita Wilkins MD  [facility]    CC:Jenise Christensen

## 2023-03-17 NOTE — PLAN OF CARE
"  Problem: Plan of Care - These are the overarching goals to be used throughout the patient stay.    Goal: Plan of Care Review  Description: The Plan of Care Review/Shift note should be completed every shift.  The Outcome Evaluation is a brief statement about your assessment that the patient is improving, declining, or no change.  This information will be displayed automatically on your shift note.  Outcome: Progressing  Goal: Patient-Specific Goal (Individualized)  Description: You can add care plan individualizations to a care plan. Examples of Individualization might be:  \"Parent requests to be called daily at 9am for status\", \"I have a hard time hearing out of my right ear\", or \"Do not touch me to wake me up as it startles me\".  Outcome: Progressing  Goal: Absence of Hospital-Acquired Illness or Injury  Outcome: Progressing  Goal: Optimal Comfort and Wellbeing  Outcome: Progressing  Goal: Readiness for Transition of Care  Outcome: Progressing     Problem: Risk for Delirium  Goal: Optimal Coping  Outcome: Progressing  Goal: Improved Behavioral Control  Outcome: Progressing  Goal: Improved Attention and Thought Clarity  Outcome: Progressing  Goal: Improved Sleep  Outcome: Progressing   Goal Outcome Evaluation:  VS stable. She denies any Sob and chest pain. Ablation is done this evening and Bilateral  groin site  from ablation  is soft no hamartoma and bleeding noted.  Bed rest is done at 1900.                  "

## 2023-03-17 NOTE — PLAN OF CARE
Pt alert and oriented, can get a bit confused. Pt has been denying pain all shift, vitally stable. Pt bilateral groin sites are Wdl, no bleeding or hematoma. Pt is on RA, SR, and pt calls appropriately. Bed alarm necessary, pt is a sba- x1 assist. JOSHUA LONGO, RN    Problem: Plan of Care - These are the overarching goals to be used throughout the patient stay.    Goal: Plan of Care Review  Description: The Plan of Care Review/Shift note should be completed every shift.  The Outcome Evaluation is a brief statement about your assessment that the patient is improving, declining, or no change.  This information will be displayed automatically on your shift note.  Outcome: Progressing     Problem: Plan of Care - These are the overarching goals to be used throughout the patient stay.    Goal: Absence of Hospital-Acquired Illness or Injury  Intervention: Identify and Manage Fall Risk  Recent Flowsheet Documentation  Taken 3/17/2023 0015 by JOSHUA LONGO  Safety Promotion/Fall Prevention:   activity supervised   assistive device/personal items within reach   clutter free environment maintained   fall prevention program maintained   lighting adjusted   mobility aid in reach   nonskid shoes/slippers when out of bed   safety round/check completed   patient and family education  Taken 3/16/2023 1915 by JOSHUA LONGO  Safety Promotion/Fall Prevention:   activity supervised   assistive device/personal items within reach   clutter free environment maintained   fall prevention program maintained   lighting adjusted   mobility aid in reach   nonskid shoes/slippers when out of bed   safety round/check completed   patient and family education     Problem: Plan of Care - These are the overarching goals to be used throughout the patient stay.    Goal: Optimal Comfort and Wellbeing  Outcome: Progressing     Problem: Risk for Delirium  Goal: Improved Sleep  Outcome: Progressing

## 2023-03-17 NOTE — PROGRESS NOTES
HEART CARE CONSULTATON NOTE        Assessment/Recommendations   Assessment:  1.  Symptomatic typical AVNRT: Status post successful ablation yesterday on 3/16/2023 doing well postprocedure.  2.  Hyponatremia: Levels improving during admission  3.  Dyslipidemia on statin therapy  4.  GERD  Plan:  1.  Follow-up being arranged in our clinic in about 6 weeks no further cardiac recommendations please call if any further questions or concerns       History of Present Illness/Subjective    Feeling well postprocedure.  No recurrent arrhythmias.  Denies chest pain or breathing difficulty       Physical Examination  Review of Systems   VITALS: /59 (BP Location: Right arm)   Pulse 62   Temp 98  F (36.7  C) (Oral)   Resp 18   Wt 67.9 kg (149 lb 11.2 oz)   SpO2 98%   BMI 24.16 kg/m    BMI: Body mass index is 24.16 kg/m .  Wt Readings from Last 3 Encounters:   03/17/23 67.9 kg (149 lb 11.2 oz)   03/16/23 70.1 kg (154 lb 8.7 oz)   02/02/23 66.2 kg (146 lb)       Intake/Output Summary (Last 24 hours) at 3/17/2023 1251  Last data filed at 3/17/2023 0900  Gross per 24 hour   Intake 1100 ml   Output 725 ml   Net 375 ml     General Appearance:   no distress, normal body habitus   ENT/Mouth: membranes moist, no oral lesions or bleeding gums.              Chest/Lungs:   lungs are clear to auscultation   Cardiovascular:   Regular. Normal first and second heart sounds with no murmurs  no edema bilaterally        Extremities: no cyanosis or clubbing   Skin: no xanthelasma, warm.    Neurologic: normal  bilateral, no tremors     Psychiatric: alert and oriented x3, calm     Review Of Systems  Skin: negative  Eyes: negative  Ears/Nose/Throat: negative  Respiratory: No shortness of breath, dyspnea on exertion, cough, or hemoptysis  Cardiovascular: negative  Gastrointestinal: negative  Genitourinary: negative  Musculoskeletal: negative  Neurologic: negative  Psychiatric: negative  Hematologic/Lymphatic/Immunologic:  negative  Endocrine: negative          Lab Results    Chemistry/lipid CBC Cardiac Enzymes/BNP/TSH/INR   No results for input(s): CHOL, HDL, LDL, TRIG, CHOLHDLRATIO in the last 59317 hours.  No results for input(s): LDL in the last 07820 hours.  Recent Labs   Lab Test 03/17/23  0443   *   POTASSIUM 3.9   CHLORIDE 103   CO2 22   GLC 91   BUN 14.3   CR 0.60   GFRESTIMATED 88   KNIJAL 8.9     Recent Labs   Lab Test 03/17/23  0443 03/15/23  0449 03/14/23  0445   CR 0.60 0.65 0.60     No results for input(s): A1C in the last 39892 hours.       Recent Labs   Lab Test 03/14/23  0445   WBC 6.5   HGB 12.0   HCT 36.6   MCV 93        Recent Labs   Lab Test 03/14/23  0445 03/13/23  1315 01/01/23  1506   HGB 12.0 12.7 12.4    Recent Labs   Lab Test 03/13/23  1315 01/01/23  1506 06/21/22  2301   TROPONINI <0.01 <0.01 <0.01     Recent Labs   Lab Test 06/21/22 2001   BNP 44     Recent Labs   Lab Test 03/13/23  1315   TSH 2.20     Recent Labs   Lab Test 03/13/23  1315   INR 1.00        Medical History  Surgical History Family History Social History   Past Medical History:   Diagnosis Date     Acute pancreatitis 04/26/2013     Allergy      DDD (degenerative disc disease), cervical 06/11/2008     Depressive disorder      Dysuria 07/17/2021     GERD (gastroesophageal reflux disease) 08/26/2010     Hip bursitis 07/13/2012     Meningioma (H) 08/26/2010     Osteoarthrosis 04/26/2013     SVT (supraventricular tachycardia) (H)      Syncope      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 LEFT EYE Occurred at time of IOL/ERM     Past Surgical History:   Procedure Laterality Date     APPENDECTOMY OPEN       GYN SURGERY       ORTHOPEDIC SURGERY       No family history of heart disease   Social History     Socioeconomic History     Marital status:      Spouse name: Not on file     Number of children: Not on file     Years of education: Not on file      Highest education level: Not on file   Occupational History     Not on file   Tobacco Use     Smoking status: Former     Types: Cigarettes     Quit date:      Years since quittin.2     Smokeless tobacco: Never   Vaping Use     Vaping Use: Never used   Substance and Sexual Activity     Alcohol use: Never     Comment: social     Drug use: No     Sexual activity: Not on file   Other Topics Concern     Not on file   Social History Narrative     Not on file     Social Determinants of Health     Financial Resource Strain: Not on file   Food Insecurity: Not on file   Transportation Needs: Not on file   Physical Activity: Not on file   Stress: Not on file   Social Connections: Not on file   Intimate Partner Violence: Not on file   Housing Stability: Not on file         Medications  Allergies   Current Outpatient Medications   Medication Sig Dispense Refill     metoprolol tartrate (LOPRESSOR) 25 MG tablet Take 1 tablet (25 mg) by mouth 2 times daily for 30 days 60 tablet 0        Allergies   Allergen Reactions     Levofloxacin Other (See Comments), Dizziness, Nausea and Unknown     dizziness  Patient unable to tolerate side effects    Patient unable to tolerate side effects  dizziness  Patient unable to tolerate side effects       Hydrocodone-Acetaminophen Other (See Comments) and Unknown     Dizziness, fainting  Other reaction(s): Syncope  Vasovagal episode      Vasovagal episode  Dizziness, fainting  Other reaction(s): Syncope  Vasovagal episode     Lisinopril Cough     Oxycodone-Acetaminophen Other (See Comments), Dizziness and Rash     Dizziness, fainting         Martita Flanagan MD

## 2023-03-19 ENCOUNTER — PATIENT OUTREACH (OUTPATIENT)
Dept: CARE COORDINATION | Facility: CLINIC | Age: 83
End: 2023-03-19
Payer: COMMERCIAL

## 2023-03-19 NOTE — PROGRESS NOTES
Clinic Care Coordination Contact  [facility]: Post-Discharge Note  SITUATION                                                      Admission:    Admission Date: 03/16/23   Reason for Admission: Symptomatic SVT  Discharge:   Discharge Date: 03/17/23  Discharge Diagnosis: SVT (supraventricular tachycardia) (H)    BACKGROUND                                                      Per hospital discharge summary and inpatient provider notes:    Anabelle Cameron is a 82 year old female with past medical history of hypertension, hyperlipidemia, GERD, depression, constipation, admitted to [facility] with symptomatic SVT and subsequently transferred to [facility] for SVT ablation.     ASSESSMENT           Discharge Assessment  How are you doing now that you are home?: I'm doind pretty good. I'm still weak and tired but OK  How are your symptoms? (Red Flag symptoms escalate to triage hotline per guidelines): Improved  Do you feel your condition is stable enough to be safe at home until your provider visit?: Yes  Does the patient have their discharge instructions? : Yes  Does the patient have questions regarding their discharge instructions? : No  Were you started on any new medications or were there changes to any of your previous medications? : Yes  Does the patient have all of their medications?: Yes  Do you have questions regarding any of your medications? : No  Discharge follow-up appointment scheduled within 14 calendar days? : Yes  Discharge Follow Up Appointment Date: 03/24/23  Discharge Follow Up Appointment Scheduled with?: Primary Care Provider         Post-op (Clinicians Only)  Did the patient have surgery or a procedure: Yes (Comprehensive Study N/A MAC  Ablation Supraventricular Tachycardia)  Incision:  (NA)  Eating & Drinking: eating and drinking without complaints/concerns  PO Intake: regular diet  Bowel Function: constipation  Date of last BM: 03/18/23 (using miralax and stool  softener)  Urinary Status: voiding without complaint/concerns        PLAN                                                      Outpatient Plan:  Follow up with primary care provider, Jenise Christensen, within 7 days to   evaluate medication change and for hospital follow- up.     Follow up with Cardiology       Future Appointments   Date Time Provider Department Center   4/26/2023  1:30 PM Mya Gamble APRN CNP [facility]         For any urgent concerns, please contact our 24 hour nurse triage line: [phone] ([phone])         Verónica Bermudez RN

## 2023-04-16 ENCOUNTER — HEALTH MAINTENANCE LETTER (OUTPATIENT)
Age: 83
End: 2023-04-16

## 2023-04-26 ENCOUNTER — OFFICE VISIT (OUTPATIENT)
Dept: CARDIOLOGY | Facility: CLINIC | Age: 83
End: 2023-04-26
Payer: COMMERCIAL

## 2023-04-26 VITALS
BODY MASS INDEX: 25.15 KG/M2 | DIASTOLIC BLOOD PRESSURE: 84 MMHG | RESPIRATION RATE: 16 BRPM | WEIGHT: 156.5 LBS | OXYGEN SATURATION: 98 % | HEIGHT: 66 IN | HEART RATE: 104 BPM | SYSTOLIC BLOOD PRESSURE: 136 MMHG

## 2023-04-26 DIAGNOSIS — I47.10 SVT (SUPRAVENTRICULAR TACHYCARDIA) (H): Primary | ICD-10-CM

## 2023-04-26 DIAGNOSIS — I47.10 PAROXYSMAL SVT (SUPRAVENTRICULAR TACHYCARDIA) (H): ICD-10-CM

## 2023-04-26 DIAGNOSIS — R00.0 SINUS TACHYCARDIA: ICD-10-CM

## 2023-04-26 DIAGNOSIS — R55 SYNCOPE, UNSPECIFIED SYNCOPE TYPE: ICD-10-CM

## 2023-04-26 PROCEDURE — 99215 OFFICE O/P EST HI 40 MIN: CPT | Performed by: NURSE PRACTITIONER

## 2023-04-26 RX ORDER — CELECOXIB 200 MG/1
CAPSULE ORAL
COMMUNITY
Start: 2023-04-24 | End: 2023-07-02

## 2023-04-26 RX ORDER — DILTIAZEM HYDROCHLORIDE 120 MG/1
120 CAPSULE, EXTENDED RELEASE ORAL DAILY
Qty: 30 CAPSULE | Refills: 6 | Status: SHIPPED | OUTPATIENT
Start: 2023-04-26 | End: 2023-07-03 | Stop reason: SINTOL

## 2023-04-26 NOTE — PROGRESS NOTES
Thank you, Dr. Christensen,  for asking the [facility] team to see Ms. Anabelle Cameron to evaluate SVT.    Assessment/Recommendations     Assessment/Plan:    Diagnoses and all orders for this visit:  SVT (supraventricular tachycardia) (H) ablation without complications and noting faster heart rates since off of beta-blocker.  She does not want to go back on metoprolol.  I recommend to do a trial of calcium channel blocker i.e. diltiazem 120 mg CD1 capsule orally every day to see if this can help relieve perceptions of palpitations.  Syncope history    Follow up in clinic with Dr. Hanley in 2 months regarding palpitations and she requested to follow with him..     History of Present Illness/Subjective     Anabelle Cameron is a very pleasant 82 year old female who comes in today for EP follow-up after SVT ablation on 3/16/2023.  Anabelle Cameron has a known history of tachycardia, syncope, meningioma, dyslipidemia, degenerative disk disease, and anxiety.  Mrs. Cameron notes acute first onset of palpitations and lightheadedness while a passenger in a car in the setting of several weeks of URI symptoms, and on 11/23/2022 underwent ER evaluation with note of episodes of regular RBBB tachycardia lasting for 15s.  She was treated with metoprolol 5mg IV with subsequent quiescence, and was discharged home without additional prescription.  She has not had recurrence of palpitations since that time. She underwent cardiac MRI 1/28/2023 showing LVEF 64% without fibrosis, infiltration or ischemia.  She underwent MCT 12/14/2022 to 1/12/2023 showing no tachycardia episodes with <1% SVEs and <1% VEs.  She denies recurrent episodes similar to her 11/23/2022 episode - on 1/20/2023 she notes different symptoms of palpitations after making a change to her nighttime sedative medications.  She was on lorazepam and she has weaned off of this.     She reports approximately a few prior episodes of syncope (she cannot state how  many) at times of dehydration of stress (first episode in , at her father's ) - these have not been associated with palpitations.  She has not sustained any prior injuries.  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  Anabelle reports that she was having significant back pain and walking almost bent over and had sciatic pain going down her leg.  She has had 2 rods placed in her lumbar back last summer.  She has had significant improvement in her posture and significant decrease in pain since her back surgery.  Since then she has been noticing that she is more short of breath with exertion such as doing housework like vacuuming.  She is also short of breath if she does a lot of housework in the same morning.  She usually tries to divide her social activities and times when she needs to do more cleaning or cooking on different days due to lower tolerance of activity in the last year.  She complains of an episode on  of being short of breath her heart beating fast and felt lightheaded and was hyperventilating.  The symptoms are typical of previous episodes.  It is notable that this is the only episode she has had since ablation.  She wants to be off of metoprolol as she believes she has significant fatigue from it.  However, she has stopped the medication and she is not experiencing significant increase in energy level.      Cardiographics (reviewed):  2023 30-days symptom monitor showed:  Mobile cardiac telemetry monitoring from 1/3/2023 to 2023 (monitored duration 27d 0h 3m).  Predominant underlying rhythm was sinus rhythm.  Overall heart rate range 52 to 192bpm, average 82bpm.  47 reported episodes of atrial fibrillation on 2023, accounting for 1% of the monitored interval, longest 21m - ventricular rates 82-192bpm, average 116bpm.  Some of the reported atrial fibrillation episodes appear to be regular supraventricular tachycardia - likely atrial  tachycardia.  1 episodes of nonsustained supraventricular tachycardia lasting 17s, 172bpm.  There were no pauses noted.  Rare supraventricular ectopic beats (<1%).  Rare premature ventricular contractions (<1%).  Symptom triggers (3, other, took medication, palpitations, dizzy) correlated to sinus rhythm, nonsustained atrial tachycardia vs atrial fibrillation.    March 2023 echo showed:  Interpretation Summary     The right ventricle is normal size.  The right ventricular systolic function is normal.  The left ventricle is normal in size.  The visual ejection fraction is 50-55%.  No regional wall motion abnormalities noted.  IVC diameter <2.1 cm collapsing >50% with sniff suggests a normal RA pressure  of 3 mmHg.  Sinus rhythm was noted.  No hemodynamically significant valvular abnormalities on 2D or color flow  Imaging.  Cardiac MR stress test showed:  SUMMARY   ==========================================================================================================     1.  Pharmacological Regadenoson stress cardiac MRI is negative for inducible myocardial ischemia.   2.  No evidence of prior myocardial infarction, focal or diffuse nonvascular myocardial fibrosis, or  infiltrative disease.  3.  Normal left ventricular size, wall thickness and systolic function. The quantified left ventricular  ejection fraction is 64%.   4.  Normal right ventricular size and systolic function.  The quantified right ventricular ejection  fraction is 55%.  5.  No significant valvular abnormalities.            Problem List:  Patient Active Problem List   Diagnosis     DDD (degenerative disc disease), cervical     Acute pancreatitis     Dysuria     GERD (gastroesophageal reflux disease)     Hip bursitis     Meningioma (H)     Osteoarthrosis     Venous tributary (branch) occlusion of retina     Syncope     Constipation, unspecified constipation type     Acute cystitis without hematuria     Yeast vaginitis     Acquired pronation of foot      Bursitis     Cataract     Chronic urinary tract infection     Congenital pes planus     Dehydration     Disorder of bone and articular cartilage     History of total knee arthroplasty, right     Lichenoid dermatitis     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     Mixed hyperlipidemia     Moderate major depression (H)     Neuropathy     Osteopenia     Painful urging to urinate     Paroxysmal SVT (supraventricular tachycardia) (H)     Peripheral sensory neuropathy     Prerenal azotemia     Primary malignant neoplasm of skin     Primary osteoarthritis of left knee     Pruritus of vulva     Rosacea     S/P colonoscopy     S/P left cataract extraction     S/P lumbar spinal fusion     Surgical aftercare, musculoskeletal system     Trigger index finger of right hand     SVT (supraventricular tachycardia) (H)     Syncope, unspecified syncope type     Revi  e  Physical Examination Review of Systems   w fystems  There were no vitals taken for this visit.  There is no height or weight on file to calculate BMI.  Wt Readings from Last 3 Encounters:   03/17/23 67.9 kg (149 lb 11.2 oz)   03/16/23 70.1 kg (154 lb 8.7 oz)   02/02/23 66.2 kg (146 lb)     General Appearance:   Alert, well-appearing and in no acute distress.   HEENT: Atraumatic, normocephalic.  No scleral icterus, normal conjunctivae.     Chest: Chest symmetric, spine straight.   Lungs:   Respirations unlabored.   Cardiovascular:   Normal first and second heart sounds with no murmurs, rubs, or gallops.  Regular, regular.   Normal JVD, no edema.       Extremities: No cyanosis or clubbing   Musculoskeletal: Moves all extremities   Skin: Warm, dry, intact.    Neurologic: Mood and affect are appropriate, alert and oriented to person, place, time, and situation     ROS: 10 point ROS neg other than the symptoms noted above in the HPI.     Medical History  Surgical History Family History Social History     Past Medical History:   Diagnosis Date     Acute pancreatitis  04/26/2013     Allergy      DDD (degenerative disc disease), cervical 06/11/2008     Depressive disorder      Dysuria 07/17/2021     GERD (gastroesophageal reflux disease) 08/26/2010     Hip bursitis 07/13/2012     Meningioma (H) 08/26/2010     Osteoarthrosis 04/26/2013     SVT (supraventricular tachycardia) (H)      Syncope      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 LEFT EYE Occurred at time of IOL/ERM    Past Surgical History:   Procedure Laterality Date     APPENDECTOMY OPEN       EP ABLATION SVT N/A 3/16/2023    Procedure: Ablation Supraventricular Tachycardia;  Surgeon: Aime Cobian MD;  Location: [facility]     EP COMPREHENSIVE EP STUDY N/A 3/16/2023    Procedure: Comprehensive Study;  Surgeon: Aime Cobian MD;  Location: [facility]     GYN SURGERY       ORTHOPEDIC SURGERY      No family history on file. History   Smoking Status     Former     Types: Cigarettes     Quit date: 1973   Smokeless Tobacco     Never     Social History    Substance and Sexual Activity      Alcohol use: Never        Comment: social       Medications  Allergies     Current Outpatient Medications   Medication Sig Dispense Refill     acetaminophen (TYLENOL) 500 MG tablet Take 500-1,000 mg by mouth every 6 hours as needed for mild pain       benzonatate (TESSALON) 200 MG capsule Take 1 capsule (200 mg) by mouth 3 times daily as needed for cough 20 capsule 0     Calcium Carbonate-Vitamin D 600-5 MG-MCG CAPS Take 1 capsule by mouth daily       Cholecalciferol 10 MCG (400 UNIT) CAPS Take 400 Units by mouth daily       fluticasone (FLONASE) 50 MCG/ACT nasal spray Spray 1 spray into both nostrils daily as needed       LORazepam (ATIVAN) 0.5 MG tablet TAKE ONE TABLET BY MOUTH AT BEDTIME AS NEEDED FOR ANXIETY       metoprolol tartrate (LOPRESSOR) 25 MG tablet Take 1 tablet (25 mg) by mouth 2 times daily for 30 days 60 tablet 0      multivitamin, therapeutic with minerals (THERA-VIT-M) TABS Take 1 tablet by mouth daily.         omeprazole (PRILOSEC) 40 MG DR capsule Take 40 mg by mouth daily       polyethylene glycol (MIRALAX) 17 GM/Dose powder Take 17 g by mouth daily as needed for constipation 510 g      pravastatin (PRAVACHOL) 10 MG tablet Take 10 mg by mouth At Bedtime        Allergies   Allergen Reactions     Levofloxacin Other (See Comments), Dizziness, Nausea and Unknown     dizziness  Patient unable to tolerate side effects    Patient unable to tolerate side effects  dizziness  Patient unable to tolerate side effects       Hydrocodone-Acetaminophen Other (See Comments) and Unknown     Dizziness, fainting  Other reaction(s): Syncope  Vasovagal episode      Vasovagal episode  Dizziness, fainting  Other reaction(s): Syncope  Vasovagal episode     Lisinopril Cough     Oxycodone-Acetaminophen Other (See Comments), Dizziness and Rash     Dizziness, fainting      Medical, surgical, family, social history, and medications were all reviewed and updated as necessary.   Lab Results    Chemistry/lipid CBC Cardiac Enzymes/BNP/TSH/INR   No results for input(s): CHOL, HDL, LDL, TRIG, CHOLHDLRATIO in the last 62276 hours.  No results for input(s): LDL in the last 12960 hours.  Recent Labs   Lab Test 03/17/23  0443   *   POTASSIUM 3.9   CHLORIDE 103   CO2 22   GLC 91   BUN 14.3   CR 0.60   GFRESTIMATED 88   KINJAL 8.9     Recent Labs   Lab Test 03/17/23  0443 03/15/23  0449 03/14/23  0445   CR 0.60 0.65 0.60     No results for input(s): A1C in the last 31548 hours.       Recent Labs   Lab Test 03/14/23  0445   WBC 6.5   HGB 12.0   HCT 36.6   MCV 93        Recent Labs   Lab Test 03/14/23  0445 03/13/23  1315 01/01/23  1506   HGB 12.0 12.7 12.4    Recent Labs   Lab Test 03/13/23  1315 01/01/23  1506 06/21/22  2301   TROPONINI <0.01 <0.01 <0.01     Recent Labs   Lab Test 06/21/22  2001   BNP 44     Recent Labs   Lab Test 03/13/23  1315   TSH  2.20     Recent Labs   Lab Test 03/13/23  1315   INR 1.00          Total Time- 45 minutes spent on date of encounter doing chart review, history and exam, documentation and further activities as noted above.  This note has been dictated using voice recognition software. Any grammatical, typographical, or context distortions are unintentional and inherent to the software.

## 2023-04-26 NOTE — LETTER
4/26/2023    Jenise Christensen MD  [address]  [facility] [address]    RE: Anabelle Cameron       Dear Colleague,     I had the pleasure of seeing Anabelle Cameron in the [facility].    Thank you, Dr. Christensen,  for asking the [facility] team to see Ms. Anabelle Cameron to evaluate SVT.    Assessment/Recommendations     Assessment/Plan:    Diagnoses and all orders for this visit:  SVT (supraventricular tachycardia) (H) ablation without complications and noting faster heart rates since off of beta-blocker.  She does not want to go back on metoprolol.  I recommend to do a trial of calcium channel blocker i.e. diltiazem 120 mg CD1 capsule orally every day to see if this can help relieve perceptions of palpitations.  Syncope history    Follow up in clinic with Dr. Hanley in 2 months regarding palpitations and she requested to follow with him..     History of Present Illness/Subjective     Anabelle Cameron is a very pleasant 82 year old female who comes in today for EP follow-up after SVT ablation on 3/16/2023.  Anabelle Cameron has a known history of tachycardia, syncope, meningioma, dyslipidemia, degenerative disk disease, and anxiety.  Mrs. Cameron notes acute first onset of palpitations and lightheadedness while a passenger in a car in the setting of several weeks of URI symptoms, and on 11/23/2022 underwent ER evaluation with note of episodes of regular RBBB tachycardia lasting for 15s.  She was treated with metoprolol 5mg IV with subsequent quiescence, and was discharged home without additional prescription.  She has not had recurrence of palpitations since that time. She underwent cardiac MRI 1/28/2023 showing LVEF 64% without fibrosis, infiltration or ischemia.  She underwent MCT 12/14/2022 to 1/12/2023 showing no tachycardia episodes with <1% SVEs and <1% VEs.  She denies recurrent episodes similar to her 11/23/2022 episode - on 1/20/2023 she notes different symptoms of  palpitations after making a change to her nighttime sedative medications.  She was on lorazepam and she has weaned off of this.     She reports approximately a few prior episodes of syncope (she cannot state how many) at times of dehydration of stress (first episode in , at her father's ) - these have not been associated with palpitations.  She has not sustained any prior injuries.  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  Anabelle reports that she was having significant back pain and walking almost bent over and had sciatic pain going down her leg.  She has had 2 rods placed in her lumbar back last summer.  She has had significant improvement in her posture and significant decrease in pain since her back surgery.  Since then she has been noticing that she is more short of breath with exertion such as doing housework like vacuuming.  She is also short of breath if she does a lot of housework in the same morning.  She usually tries to divide her social activities and times when she needs to do more cleaning or cooking on different days due to lower tolerance of activity in the last year.  She complains of an episode on  of being short of breath her heart beating fast and felt lightheaded and was hyperventilating.  The symptoms are typical of previous episodes.  It is notable that this is the only episode she has had since ablation.  She wants to be off of metoprolol as she believes she has significant fatigue from it.  However, she has stopped the medication and she is not experiencing significant increase in energy level.      Cardiographics (reviewed):  2023 30-days symptom monitor showed:  Mobile cardiac telemetry monitoring from 1/3/2023 to 2023 (monitored duration 27d 0h 3m).  Predominant underlying rhythm was sinus rhythm.  Overall heart rate range 52 to 192bpm, average 82bpm.  47 reported episodes of atrial fibrillation on 2023, accounting for 1% of  the monitored interval, longest 21m - ventricular rates 82-192bpm, average 116bpm.  Some of the reported atrial fibrillation episodes appear to be regular supraventricular tachycardia - likely atrial tachycardia.  1 episodes of nonsustained supraventricular tachycardia lasting 17s, 172bpm.  There were no pauses noted.  Rare supraventricular ectopic beats (<1%).  Rare premature ventricular contractions (<1%).  Symptom triggers (3, other, took medication, palpitations, dizzy) correlated to sinus rhythm, nonsustained atrial tachycardia vs atrial fibrillation.    March 2023 echo showed:  Interpretation Summary     The right ventricle is normal size.  The right ventricular systolic function is normal.  The left ventricle is normal in size.  The visual ejection fraction is 50-55%.  No regional wall motion abnormalities noted.  IVC diameter <2.1 cm collapsing >50% with sniff suggests a normal RA pressure  of 3 mmHg.  Sinus rhythm was noted.  No hemodynamically significant valvular abnormalities on 2D or color flow  Imaging.  Cardiac MR stress test showed:  SUMMARY   ==========================================================================================================     1.  Pharmacological Regadenoson stress cardiac MRI is negative for inducible myocardial ischemia.   2.  No evidence of prior myocardial infarction, focal or diffuse nonvascular myocardial fibrosis, or  infiltrative disease.  3.  Normal left ventricular size, wall thickness and systolic function. The quantified left ventricular  ejection fraction is 64%.   4.  Normal right ventricular size and systolic function.  The quantified right ventricular ejection  fraction is 55%.  5.  No significant valvular abnormalities.            Problem List:  Patient Active Problem List   Diagnosis    DDD (degenerative disc disease), cervical    Acute pancreatitis    Dysuria    GERD (gastroesophageal reflux disease)    Hip bursitis    Meningioma (H)    Osteoarthrosis     Venous tributary (branch) occlusion of retina    Syncope    Constipation, unspecified constipation type    Acute cystitis without hematuria    Yeast vaginitis    Acquired pronation of foot    Bursitis    Cataract    Chronic urinary tract infection    Congenital pes planus    Dehydration    Disorder of bone and articular cartilage    History of total knee arthroplasty, right    Lichenoid dermatitis    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    Mixed hyperlipidemia    Moderate major depression (H)    Neuropathy    Osteopenia    Painful urging to urinate    Paroxysmal SVT (supraventricular tachycardia) (H)    Peripheral sensory neuropathy    Prerenal azotemia    Primary malignant neoplasm of skin    Primary osteoarthritis of left knee    Pruritus of vulva    Rosacea    S/P colonoscopy    S/P left cataract extraction    S/P lumbar spinal fusion    Surgical aftercare, musculoskeletal system    Trigger index finger of right hand    SVT (supraventricular tachycardia) (H)    Syncope, unspecified syncope type     Revi  e  Physical Examination Review of Systems   w fystems  There were no vitals taken for this visit.  There is no height or weight on file to calculate BMI.  Wt Readings from Last 3 Encounters:   03/17/23 67.9 kg (149 lb 11.2 oz)   03/16/23 70.1 kg (154 lb 8.7 oz)   02/02/23 66.2 kg (146 lb)     General Appearance:   Alert, well-appearing and in no acute distress.   HEENT: Atraumatic, normocephalic.  No scleral icterus, normal conjunctivae.     Chest: Chest symmetric, spine straight.   Lungs:   Respirations unlabored.   Cardiovascular:   Normal first and second heart sounds with no murmurs, rubs, or gallops.  Regular, regular.   Normal JVD, no edema.       Extremities: No cyanosis or clubbing   Musculoskeletal: Moves all extremities   Skin: Warm, dry, intact.    Neurologic: Mood and affect are appropriate, alert and oriented to person, place, time, and situation     ROS: 10 point ROS neg other than the symptoms  noted above in the HPI.     Medical History  Surgical History Family History Social History     Past Medical History:   Diagnosis Date    Acute pancreatitis 04/26/2013    Allergy     DDD (degenerative disc disease), cervical 06/11/2008    Depressive disorder     Dysuria 07/17/2021    GERD (gastroesophageal reflux disease) 08/26/2010    Hip bursitis 07/13/2012    Meningioma (H) 08/26/2010    Osteoarthrosis 04/26/2013    SVT (supraventricular tachycardia) (H)     Syncope     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 LEFT EYE Occurred at time of IOL/ERM    Past Surgical History:   Procedure Laterality Date    APPENDECTOMY OPEN      EP ABLATION SVT N/A 3/16/2023    Procedure: Ablation Supraventricular Tachycardia;  Surgeon: Aime Cobian MD;  Location: [facility]    EP COMPREHENSIVE EP STUDY N/A 3/16/2023    Procedure: Comprehensive Study;  Surgeon: Aime Cobian MD;  Location: [facility]    GYN SURGERY      ORTHOPEDIC SURGERY      No family history on file. History   Smoking Status    Former    Types: Cigarettes    Quit date: 1973   Smokeless Tobacco    Never     Social History    Substance and Sexual Activity      Alcohol use: Never        Comment: social       Medications  Allergies     Current Outpatient Medications   Medication Sig Dispense Refill    acetaminophen (TYLENOL) 500 MG tablet Take 500-1,000 mg by mouth every 6 hours as needed for mild pain      benzonatate (TESSALON) 200 MG capsule Take 1 capsule (200 mg) by mouth 3 times daily as needed for cough 20 capsule 0    Calcium Carbonate-Vitamin D 600-5 MG-MCG CAPS Take 1 capsule by mouth daily      Cholecalciferol 10 MCG (400 UNIT) CAPS Take 400 Units by mouth daily      fluticasone (FLONASE) 50 MCG/ACT nasal spray Spray 1 spray into both nostrils daily as needed      LORazepam (ATIVAN) 0.5 MG tablet TAKE ONE TABLET BY MOUTH AT BEDTIME AS NEEDED FOR  ANXIETY      metoprolol tartrate (LOPRESSOR) 25 MG tablet Take 1 tablet (25 mg) by mouth 2 times daily for 30 days 60 tablet 0    multivitamin, therapeutic with minerals (THERA-VIT-M) TABS Take 1 tablet by mouth daily.        omeprazole (PRILOSEC) 40 MG DR capsule Take 40 mg by mouth daily      polyethylene glycol (MIRALAX) 17 GM/Dose powder Take 17 g by mouth daily as needed for constipation 510 g     pravastatin (PRAVACHOL) 10 MG tablet Take 10 mg by mouth At Bedtime        Allergies   Allergen Reactions    Levofloxacin Other (See Comments), Dizziness, Nausea and Unknown     dizziness  Patient unable to tolerate side effects    Patient unable to tolerate side effects  dizziness  Patient unable to tolerate side effects      Hydrocodone-Acetaminophen Other (See Comments) and Unknown     Dizziness, fainting  Other reaction(s): Syncope  Vasovagal episode      Vasovagal episode  Dizziness, fainting  Other reaction(s): Syncope  Vasovagal episode    Lisinopril Cough    Oxycodone-Acetaminophen Other (See Comments), Dizziness and Rash     Dizziness, fainting      Medical, surgical, family, social history, and medications were all reviewed and updated as necessary.   Lab Results    Chemistry/lipid CBC Cardiac Enzymes/BNP/TSH/INR   No results for input(s): CHOL, HDL, LDL, TRIG, CHOLHDLRATIO in the last 75436 hours.  No results for input(s): LDL in the last 60825 hours.  Recent Labs   Lab Test 03/17/23  0443   *   POTASSIUM 3.9   CHLORIDE 103   CO2 22   GLC 91   BUN 14.3   CR 0.60   GFRESTIMATED 88   KINJAL 8.9     Recent Labs   Lab Test 03/17/23  0443 03/15/23  0449 03/14/23  0445   CR 0.60 0.65 0.60     No results for input(s): A1C in the last 90433 hours.       Recent Labs   Lab Test 03/14/23  0445   WBC 6.5   HGB 12.0   HCT 36.6   MCV 93        Recent Labs   Lab Test 03/14/23  0445 03/13/23  1315 01/01/23  1506   HGB 12.0 12.7 12.4    Recent Labs   Lab Test 03/13/23  1315 01/01/23  1506 06/21/22  2301    TROPONINI <0.01 <0.01 <0.01     Recent Labs   Lab Test 06/21/22 2001   BNP 44     Recent Labs   Lab Test 03/13/23  1315   TSH 2.20     Recent Labs   Lab Test 03/13/23  1315   INR 1.00          Total Time- 45 minutes spent on date of encounter doing chart review, history and exam, documentation and further activities as noted above.  This note has been dictated using voice recognition software. Any grammatical, typographical, or context distortions are unintentional and inherent to the software.        Thank you for allowing me to participate in the care of your patient.      Sincerely,     CARLOTA Dooley [facility]  cc:   Tiera Shearer, CNP  [address] [place] [address]

## 2023-04-26 NOTE — PATIENT INSTRUCTIONS
Anabelle Cameron,    It was a pleasure to see you today at the [facility].     My recommendations after this visit include:    Diltiazem 120 mg CD 1 capsule orally every day.    To followup with Dr. Hanley in about 2 months regarding palpitations.      My contact information:  Cristian Gamble CNP  After Hours or Scheduling  [phone]  My Nurse---[place] [phone]

## 2023-06-12 ENCOUNTER — OFFICE VISIT (OUTPATIENT)
Dept: CARDIOLOGY | Facility: CLINIC | Age: 83
End: 2023-06-12
Attending: NURSE PRACTITIONER
Payer: COMMERCIAL

## 2023-06-12 VITALS
HEART RATE: 73 BPM | SYSTOLIC BLOOD PRESSURE: 122 MMHG | DIASTOLIC BLOOD PRESSURE: 71 MMHG | BODY MASS INDEX: 25.16 KG/M2 | WEIGHT: 154 LBS | RESPIRATION RATE: 16 BRPM | OXYGEN SATURATION: 98 %

## 2023-06-12 DIAGNOSIS — I47.10 PAROXYSMAL SVT (SUPRAVENTRICULAR TACHYCARDIA) (H): Primary | ICD-10-CM

## 2023-06-12 DIAGNOSIS — R00.0 SINUS TACHYCARDIA: ICD-10-CM

## 2023-06-12 DIAGNOSIS — R55 SYNCOPE, UNSPECIFIED SYNCOPE TYPE: ICD-10-CM

## 2023-06-12 PROCEDURE — 99214 OFFICE O/P EST MOD 30 MIN: CPT | Performed by: INTERNAL MEDICINE

## 2023-06-12 NOTE — LETTER
2023    Jenise Christensen MD  [address]  [facility] [address]    RE: Anabelle Cameron       Dear Colleague,     I had the pleasure of seeing Anabelle Cameron in the [facility].     [facility]  Cardiac Electrophysiology  [address]  [place] [address]   Office: [phone]  Fax: [phone]     Cardiac Electrophysiology Follow-up Visit    Patient: Anabelle Cameron   : 1940     Primary Care Provider: Clinic, [facility]    CHIEF COMPLAINT/REASON FOR VISIT  Wide complex tachycardia, status post EP study showing slow-fast AVNRT and slow pathway ablation 3/16/2023    Assessment/Recommendations   Anabelle Cameron is a 82 year old female with tachycardia  status post EP study showing slow-fast AVNRT and slow pathway ablation 3/16/2023, syncope, meningioma, dyslipidemia, degenerative disk disease, anxiety presenting for follow-up regarding tachycardia.    Wide complex tachycardia - associated with palpitations without concurrent syncope or pre-syncope, noted for the first time on 2022.  Suspected SVT with aberrancy (typical RBBB morphology) over VT given normal cardiac MRI  Cardiac MRI 2023 showing LVEF 64% without fibrosis, infiltration or ischemia.    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  If should would like a routine 1 year follow-up visit with EP NP, this can be coordinated.         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    Mrs. Cameron notes acute first onset of palpitations and lightheadedness while a passenger in a car in the setting of several weeks of URI symptoms, and on 2022 underwent ER evaluation with note of episodes of regular RBBB tachycardia lasting for 15s.  She was treated with metoprolol 5mg IV with subsequent quiescence, and was discharged home without additional prescription.  She has not had recurrence of palpitations since that time. She underwent cardiac MRI 2023 showing LVEF 64% without fibrosis, infiltration or ischemia.  She underwent MCT 2022 to 2023 showing no tachycardia episodes with <1% SVEs and <1% VEs.  She denies recurrent episodes similar to her 2022 episode - on 2023 she notes different symptoms of palpitations after making a change to her nighttime sedative medications.  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  She notes some palpitations thereafter and was started on diltiazem CD 120mg daily.  She notes improved palpitations though has struggled with sleepiness thereafter.      She reports approximately a few prior episodes of syncope (she cannot state how many) at times of dehydration of stress (first episode in , at her father's ) - these have not been associated with palpitations.  She has not sustained any prior injuries.    Her daughter passed away from cancer in 10/2022.       Physical Examination  Review of Systems   VITALS: /71 (BP Location: Left arm, Patient Position: Sitting, Cuff Size: Adult Regular)   Pulse 73   Resp 16   Wt 69.9 kg (154 lb)   SpO2 98%   BMI 25.16 kg/m    Wt Readings from Last 3 Encounters:   23 71 kg (156 lb 8 oz)   23 67.9 kg (149 lb 11.2 oz)   23 70.1 kg (154 lb 8.7 oz)     CONSTITUTIONAL: well nourished, comfortable, no distress  EYES:  Conjunctivae pink, sclerae  clear.    E/N/T:  Oral mucosa pink  RESPIRATORY:  Respiratory effort is normal  CARDIOVASCULAR:  normal S1 and S2  GASTROINTESTINAL:  Abdomen without masses or tenderness  EXTREMITIES:  No clubbing or cyanosis.    MUSCULOSKELETAL:  Overall grossly normal muscle strength  SKIN:  Overall, skin warm and dry, no lesions.  NEURO/PSYCH:  Oriented x 3 with normal affect.   Constitutional:  No weight loss or loss of appetite    Eyes:  No difficulty with vision, no double vision, no dry eyes  ENT:  No sore throat, difficulty swallowing; changes in hearing or tinnitus  Cardiovascular: As detailed above  Respiratory:  No cough  Musculoskeletal  No joint pain, muscle aches  Neurologic:  No syncope, lightheadedness, fainting spells   Hematologic: No easy bruising, excessive bleeding tendency   Gastrointestinal:  No jaundice, abdominal pain or abdominal bloating  Genitourinary: No changes in urinary habits, no trouble urinating    Psychiatric: No anxiety or depression      Medical History  Surgical History   Past Medical History:   Diagnosis Date    Acute pancreatitis 04/26/2013    Allergy     DDD (degenerative disc disease), cervical 06/11/2008    Depressive disorder     Dysuria 07/17/2021    GERD (gastroesophageal reflux disease) 08/26/2010    Hip bursitis 07/13/2012    Meningioma (H) 08/26/2010    Osteoarthrosis 04/26/2013    SVT (supraventricular tachycardia) (H)     Syncope     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 LEFT EYE Occurred at time of IOL/ERM    Past Surgical History:   Procedure Laterality Date    APPENDECTOMY OPEN      EP ABLATION SVT N/A 3/16/2023    Procedure: Ablation Supraventricular Tachycardia;  Surgeon: Aime Cobian MD;  Location: [facility]    EP COMPREHENSIVE EP STUDY N/A 3/16/2023    Procedure: Comprehensive Study;  Surgeon: Aime Cobian MD;  Location: [facility]    GYN SURGERY       ORTHOPEDIC SURGERY           Family History Social History   No family history on file.     Social History     Tobacco Use    Smoking status: Former     Types: Cigarettes     Quit date:      Years since quittin.4    Smokeless tobacco: Never   Vaping Use    Vaping status: Never Used   Substance Use Topics    Alcohol use: Never     Comment: social    Drug use: No         Medications  Allergies     Current Outpatient Medications:     acetaminophen (TYLENOL) 500 MG tablet, Take 500-1,000 mg by mouth every 6 hours as needed for mild pain, Disp: , Rfl:     benzonatate (TESSALON) 200 MG capsule, Take 1 capsule (200 mg) by mouth 3 times daily as needed for cough, Disp: 20 capsule, Rfl: 0    Calcium Carbonate-Vitamin D 600-5 MG-MCG CAPS, Take 1 capsule by mouth daily, Disp: , Rfl:     celecoxib (CELEBREX) 200 MG capsule, , Disp: , Rfl:     Cholecalciferol 10 MCG (400 UNIT) CAPS, Take 400 Units by mouth daily, Disp: , Rfl:     diltiazem ER (TIAZAC) 120 MG 24 hr ER beaded capsule, Take 1 capsule (120 mg) by mouth daily, Disp: 30 capsule, Rfl: 6    fluticasone (FLONASE) 50 MCG/ACT nasal spray, Spray 1 spray into both nostrils daily as needed, Disp: , Rfl:     LORazepam (ATIVAN) 0.5 MG tablet, TAKE ONE TABLET BY MOUTH AT BEDTIME AS NEEDED FOR ANXIETY, Disp: , Rfl:     metoprolol tartrate (LOPRESSOR) 25 MG tablet, Take 1 tablet (25 mg) by mouth 2 times daily for 30 days, Disp: 60 tablet, Rfl: 0    multivitamin, therapeutic with minerals (THERA-VIT-M) TABS, Take 1 tablet by mouth daily.  , Disp: , Rfl:     omeprazole (PRILOSEC) 40 MG DR capsule, Take 40 mg by mouth daily, Disp: , Rfl:     polyethylene glycol (MIRALAX) 17 GM/Dose powder, Take 17 g by mouth daily as needed for constipation, Disp: 510 g, Rfl:     pravastatin (PRAVACHOL) 10 MG tablet, Take 10 mg by mouth At Bedtime, Disp: , Rfl:      Allergies   Allergen Reactions    Levofloxacin Other (See Comments), Dizziness, Nausea and Unknown     dizziness  Patient  unable to tolerate side effects    Patient unable to tolerate side effects  dizziness  Patient unable to tolerate side effects      Hydrocodone-Acetaminophen Other (See Comments) and Unknown     Dizziness, fainting  Other reaction(s): Syncope  Vasovagal episode      Vasovagal episode  Dizziness, fainting  Other reaction(s): Syncope  Vasovagal episode    Hydroxyzine      Kept patient up instead of sleeping    Lisinopril Cough    Oxycodone-Acetaminophen Other (See Comments), Dizziness and Rash     Dizziness, fainting          Lab Results    Chemistry CBC Cardiac Enzymes/BNP/TSH/INR   Recent Labs   Lab Test 11/23/22  1544   *   POTASSIUM 3.4   CHLORIDE 99   CO2 22   *   BUN 17.4   CR 0.64   GFRESTIMATED 87   KINJAL 9.8     Recent Labs   Lab Test 11/23/22  1544 06/21/22 2001 07/18/21  0551   CR 0.64 0.51* 0.63          Recent Labs   Lab Test 11/23/22  1544   WBC 8.6   HGB 13.5   HCT 40.6   MCV 89        Recent Labs   Lab Test 11/23/22  1544 06/21/22 2001 07/18/21  0551   HGB 13.5 9.1* 11.8    Recent Labs   Lab Test 06/21/22  2301 06/21/22 2001 07/18/21  0551   TROPONINI <0.01 <0.01 <0.01     Recent Labs   Lab Test 06/21/22 2001   BNP 44     Recent Labs   Lab Test 11/23/22  1544   TSH 2.80     No results for input(s): INR in the last 25585 hours.      Data Review    ECGs (tracings independently reviewed)  11/23/2022 - short RP tachycardia 207bpm, typical RBBB QRS 110ms  11/23/2022 - SR 104bpm, QRS 72ms    Mobile cardiac telemetry monitoring from 12/14/2022 to 1/12/2023 (personally reviewed)  Predominant underlying rhythm was sinus rhythm, 50 to 120bpm, average 79bpm.  No tachyarrhythmias.  No atrial fibrillation.  There were no pauses noted.  Rare supraventricular ectopic beats (<1%).  Rare premature ventricular contractions (<1%).  No symptoms recorded    1/18/2023 cardiac MRI with stress  1.  Pharmacological Regadenoson stress cardiac MRI is negative for inducible myocardial ischemia.   2.  No  evidence of prior myocardial infarction, focal or diffuse nonvascular myocardial fibrosis, or  infiltrative disease.  3.  Normal left ventricular size, wall thickness and systolic function. The quantified left ventricular  ejection fraction is 64%.   4.  Normal right ventricular size and systolic function.  The quantified right ventricular ejection  fraction is 55%.  5.  No significant valvular abnormalities.    7/18/2021 TTE  1. Normal left ventricular size and systolic performance with a visually  estimated ejection fraction of 65%.  2. No significant valvular heart disease is identified on this study.  3. Normal right ventricular size and systolic performance.         Cc: Jenise Hanley MD  6/12/2023  1:40 PM          Thank you for allowing me to participate in the care of your patient.      Sincerely,     Saúl Hanley MD     [facility]  cc:   CARLOTA Dooley CNP  [address]  [place] [address]

## 2023-06-12 NOTE — PROGRESS NOTES
[facility]  Cardiac Electrophysiology  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    Wide complex tachycardia - associated with palpitations without concurrent syncope or pre-syncope, noted for the first time on 2022.  Suspected SVT with aberrancy (typical RBBB morphology) over VT given normal cardiac MRI  Cardiac MRI 2023 showing LVEF 64% without fibrosis, infiltration or ischemia.    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  If should would like a routine 1 year follow-up visit with EP NP, this can be coordinated.         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    Mrs. Cameron notes acute first onset of palpitations and lightheadedness while a passenger in a car in the setting of several  weeks of URI symptoms, and on 2022 underwent ER evaluation with note of episodes of regular RBBB tachycardia lasting for 15s.  She was treated with metoprolol 5mg IV with subsequent quiescence, and was discharged home without additional prescription.  She has not had recurrence of palpitations since that time. She underwent cardiac MRI 2023 showing LVEF 64% without fibrosis, infiltration or ischemia.  She underwent MCT 2022 to 2023 showing no tachycardia episodes with <1% SVEs and <1% VEs.  She denies recurrent episodes similar to her 2022 episode - on 2023 she notes different symptoms of palpitations after making a change to her nighttime sedative medications.  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  She notes some palpitations thereafter and was started on diltiazem CD 120mg daily.  She notes improved palpitations though has struggled with sleepiness thereafter.      She reports approximately a few prior episodes of syncope (she cannot state how many) at times of dehydration of stress (first episode in , at her father's ) - these have not been associated with palpitations.  She has not sustained any prior injuries.    Her daughter passed away from cancer in 10/2022.       Physical Examination  Review of Systems   VITALS: /71 (BP Location: Left arm, Patient Position: Sitting, Cuff Size: Adult Regular)   Pulse 73   Resp 16   Wt 69.9 kg (154 lb)   SpO2 98%   BMI 25.16 kg/m    Wt Readings from Last 3 Encounters:   23 71 kg (156 lb 8 oz)   23 67.9 kg (149 lb 11.2 oz)   23 70.1 kg (154 lb 8.7 oz)     CONSTITUTIONAL: well nourished, comfortable, no distress  EYES:  Conjunctivae pink, sclerae clear.    E/N/T:  Oral mucosa pink  RESPIRATORY:  Respiratory effort is normal  CARDIOVASCULAR:  normal S1 and S2  GASTROINTESTINAL:  Abdomen without masses or tenderness  EXTREMITIES:  No clubbing or cyanosis.     MUSCULOSKELETAL:  Overall grossly normal muscle strength  SKIN:  Overall, skin warm and dry, no lesions.  NEURO/PSYCH:  Oriented x 3 with normal affect.   Constitutional:  No weight loss or loss of appetite    Eyes:  No difficulty with vision, no double vision, no dry eyes  ENT:  No sore throat, difficulty swallowing; changes in hearing or tinnitus  Cardiovascular: As detailed above  Respiratory:  No cough  Musculoskeletal  No joint pain, muscle aches  Neurologic:  No syncope, lightheadedness, fainting spells   Hematologic: No easy bruising, excessive bleeding tendency   Gastrointestinal:  No jaundice, abdominal pain or abdominal bloating  Genitourinary: No changes in urinary habits, no trouble urinating    Psychiatric: No anxiety or depression      Medical History  Surgical History   Past Medical History:   Diagnosis Date     Acute pancreatitis 04/26/2013     Allergy      DDD (degenerative disc disease), cervical 06/11/2008     Depressive disorder      Dysuria 07/17/2021     GERD (gastroesophageal reflux disease) 08/26/2010     Hip bursitis 07/13/2012     Meningioma (H) 08/26/2010     Osteoarthrosis 04/26/2013     SVT (supraventricular tachycardia) (H)      Syncope      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 LEFT EYE Occurred at time of IOL/ERM    Past Surgical History:   Procedure Laterality Date     APPENDECTOMY OPEN       EP ABLATION SVT N/A 3/16/2023    Procedure: Ablation Supraventricular Tachycardia;  Surgeon: Aime Cobian MD;  Location: [facility]     EP COMPREHENSIVE EP STUDY N/A 3/16/2023    Procedure: Comprehensive Study;  Surgeon: Aime Cobian MD;  Location: [facility]     GYN SURGERY       ORTHOPEDIC SURGERY           Family History Social History   No family history on file.     Social History     Tobacco Use     Smoking status: Former     Types: Cigarettes     Quit date: 1973      Years since quittin.4     Smokeless tobacco: Never   Vaping Use     Vaping status: Never Used   Substance Use Topics     Alcohol use: Never     Comment: social     Drug use: No         Medications  Allergies     Current Outpatient Medications:      acetaminophen (TYLENOL) 500 MG tablet, Take 500-1,000 mg by mouth every 6 hours as needed for mild pain, Disp: , Rfl:      benzonatate (TESSALON) 200 MG capsule, Take 1 capsule (200 mg) by mouth 3 times daily as needed for cough, Disp: 20 capsule, Rfl: 0     Calcium Carbonate-Vitamin D 600-5 MG-MCG CAPS, Take 1 capsule by mouth daily, Disp: , Rfl:      celecoxib (CELEBREX) 200 MG capsule, , Disp: , Rfl:      Cholecalciferol 10 MCG (400 UNIT) CAPS, Take 400 Units by mouth daily, Disp: , Rfl:      diltiazem ER (TIAZAC) 120 MG 24 hr ER beaded capsule, Take 1 capsule (120 mg) by mouth daily, Disp: 30 capsule, Rfl: 6     fluticasone (FLONASE) 50 MCG/ACT nasal spray, Spray 1 spray into both nostrils daily as needed, Disp: , Rfl:      LORazepam (ATIVAN) 0.5 MG tablet, TAKE ONE TABLET BY MOUTH AT BEDTIME AS NEEDED FOR ANXIETY, Disp: , Rfl:      metoprolol tartrate (LOPRESSOR) 25 MG tablet, Take 1 tablet (25 mg) by mouth 2 times daily for 30 days, Disp: 60 tablet, Rfl: 0     multivitamin, therapeutic with minerals (THERA-VIT-M) TABS, Take 1 tablet by mouth daily.  , Disp: , Rfl:      omeprazole (PRILOSEC) 40 MG DR capsule, Take 40 mg by mouth daily, Disp: , Rfl:      polyethylene glycol (MIRALAX) 17 GM/Dose powder, Take 17 g by mouth daily as needed for constipation, Disp: 510 g, Rfl:      pravastatin (PRAVACHOL) 10 MG tablet, Take 10 mg by mouth At Bedtime, Disp: , Rfl:      Allergies   Allergen Reactions     Levofloxacin Other (See Comments), Dizziness, Nausea and Unknown     dizziness  Patient unable to tolerate side effects    Patient unable to tolerate side effects  dizziness  Patient unable to tolerate side effects       Hydrocodone-Acetaminophen Other (See Comments)  and Unknown     Dizziness, fainting  Other reaction(s): Syncope  Vasovagal episode      Vasovagal episode  Dizziness, fainting  Other reaction(s): Syncope  Vasovagal episode     Hydroxyzine      Kept patient up instead of sleeping     Lisinopril Cough     Oxycodone-Acetaminophen Other (See Comments), Dizziness and Rash     Dizziness, fainting          Lab Results    Chemistry CBC Cardiac Enzymes/BNP/TSH/INR   Recent Labs   Lab Test 11/23/22  1544   *   POTASSIUM 3.4   CHLORIDE 99   CO2 22   *   BUN 17.4   CR 0.64   GFRESTIMATED 87   KINJAL 9.8     Recent Labs   Lab Test 11/23/22  1544 06/21/22 2001 07/18/21  0551   CR 0.64 0.51* 0.63          Recent Labs   Lab Test 11/23/22  1544   WBC 8.6   HGB 13.5   HCT 40.6   MCV 89        Recent Labs   Lab Test 11/23/22  1544 06/21/22 2001 07/18/21  0551   HGB 13.5 9.1* 11.8    Recent Labs   Lab Test 06/21/22  2301 06/21/22 2001 07/18/21  0551   TROPONINI <0.01 <0.01 <0.01     Recent Labs   Lab Test 06/21/22 2001   BNP 44     Recent Labs   Lab Test 11/23/22  1544   TSH 2.80     No results for input(s): INR in the last 43998 hours.      Data Review    ECGs (tracings independently reviewed)  11/23/2022 - short RP tachycardia 207bpm, typical RBBB QRS 110ms  11/23/2022 - SR 104bpm, QRS 72ms    Mobile cardiac telemetry monitoring from 12/14/2022 to 1/12/2023 (personally reviewed)  Predominant underlying rhythm was sinus rhythm, 50 to 120bpm, average 79bpm.  No tachyarrhythmias.  No atrial fibrillation.  There were no pauses noted.  Rare supraventricular ectopic beats (<1%).  Rare premature ventricular contractions (<1%).  No symptoms recorded    1/18/2023 cardiac MRI with stress  1.  Pharmacological Regadenoson stress cardiac MRI is negative for inducible myocardial ischemia.   2.  No evidence of prior myocardial infarction, focal or diffuse nonvascular myocardial fibrosis, or  infiltrative disease.  3.  Normal left ventricular size, wall thickness and systolic  function. The quantified left ventricular  ejection fraction is 64%.   4.  Normal right ventricular size and systolic function.  The quantified right ventricular ejection  fraction is 55%.  5.  No significant valvular abnormalities.    7/18/2021 TTE  1. Normal left ventricular size and systolic performance with a visually  estimated ejection fraction of 65%.  2. No significant valvular heart disease is identified on this study.  3. Normal right ventricular size and systolic performance.         Cc: Jenise Hanley MD  6/12/2023  1:40 PM

## 2023-06-20 ENCOUNTER — TELEPHONE (OUTPATIENT)
Dept: CARDIOLOGY | Facility: CLINIC | Age: 83
End: 2023-06-20

## 2023-06-20 NOTE — TELEPHONE ENCOUNTER
Message  Received: Today  Madie Hendricks, Mine Perez, LPN  Caller: Unspecified (Today,  1:58 PM)  Recommend increasing her water intake to 60 ounces daily (if she is not drinking enough currently) along with fiber intake to 20 to 25 grams daily ( may use fiber gummies over the counter). Continue with MOM daily or may use prune juice daily. Increase activity as well as tolerated to keep things moving.     Madie     Pt was called back reviewed message,pt understands agrees to plan will call next Tuesday with update she has my direct number to call

## 2023-06-20 NOTE — TELEPHONE ENCOUNTER
Pt is calling for Cristian who is out of clinic and pt is aware  Pt was seen by Cristian 4/26/23 and recent 6/12/23 Dr Hanley  Pt concern with Diltiazem 120 mg states she is extremely constipated,sometime only has 1 bowel movement in a week recent use of MOM helped, and it makes her depressed and very tired and bad headaches  Pt is aware we may not get back to her until Wed and she is ok with that,reviewed another NP will review and i will get back to her

## 2023-06-27 ENCOUNTER — TELEPHONE (OUTPATIENT)
Dept: CARDIOLOGY | Facility: CLINIC | Age: 83
End: 2023-06-27
Payer: COMMERCIAL

## 2023-06-27 NOTE — TELEPHONE ENCOUNTER
Pt calling for Cristian,  Please see prior note about constipation  Pt states she did not do the fiber gummies used MOM for 2 days and had diarrhea, now using stool softeners and this gives her a bowel movement seems cannot have movements on her own  Wonders if it is the Diltiazem, pt states her HR's are fine is very tired, reviewed with Cristian, pt is to see us next week to stop her Diltiazem 4 day prior appt and may continue with the stool softers  Pt appt 7/3 at   Pt agrees to plan confirmed with

## 2023-07-02 PROBLEM — R00.0 WIDE-COMPLEX TACHYCARDIA: Status: ACTIVE | Noted: 2023-07-02

## 2023-07-02 PROBLEM — R29.898 WEAKNESS OF RIGHT LOWER EXTREMITY: Status: ACTIVE | Noted: 2023-04-24

## 2023-07-02 PROBLEM — K59.00 CONSTIPATION: Status: ACTIVE | Noted: 2020-09-22

## 2023-07-02 NOTE — PROGRESS NOTES
Thank you, Cristian Gamble CNP, for asking the [facility] team to see Ms. 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  Suspected SVT with aberrancy (typical RBBB morphology) over VT given normal cardiac MRI  Cardiac MRI 1/28/2023 showing LVEF 64% without fibrosis, infiltration or ischemia.    Status post EP study showing slow-fast AVNRT and slow pathway ablation 3/16/2023  Patient thinks her constipation is directly related to her Diltiazem medication so she stopped taking it 4 days ago.  Previously, she did experience fatigue being on metoprolol and she recalls experiencing some further side effects being on metoprolol which included palpitations and not feeling quite right so she is hesitant to go back on metoprolol at this time.  We had a long discussion today about constipation and if she can get a handle on the constipation, recommend that she stay on diltiazem at this time since she has had no recurrence of SVT or palpitations since her ablation.  Resume Diltiazem  mg daily  She is willing to stay on the diltiazem for now and try the fiber well Gummies, 10 g daily.  She will get the remainder of her fiber through her diet.  She is also lacking in water intake daily, she should aim for 64 ounces daily, she is currently getting around 30 ounces per day.  She is also lacking in physical activity daily due to dealing with some bone-on-bone in her knee. Activity as tolerated.   She will avoid further use of milk of magnesia at this time since it did result in liquid diarrhea.            Drug-induced constipation    Patient thinks her constipation is directly related to her Diltiazem medication  Discussed the importance of hydration, adequate fiber intake and regular physical activity  Aim for 20 -25 grams of fiber daily, recommend Fiber Well gummies daily  She was provided with educational materials on how to manage constipation at home  If she continues to experience constipation, recommend follow up with PCP, she is stable from cardiology standpoint.         Follow up PRN for SVT issues. Constipation issues should be directed to PCP going forward.      History of Present Illness/Subjective    Anabelle Cameron is a 82 year old female with tachycardia  status post EP study showing slow-fast AVNRT and slow pathway ablation 3/16/2023, syncope, meningioma, dyslipidemia, degenerative disk disease, anxiety presenting for follow-up regarding constipation which she thinks is directly related to her Diltiazem.      Mrs. Cameron notes acute first onset of palpitations and lightheadedness while a passenger in a car in the setting of several weeks of URI symptoms, and on 11/23/2022 underwent ER evaluation with note of episodes of regular RBBB tachycardia lasting for 15s.  She was treated with metoprolol 5mg IV with subsequent quiescence, and was discharged home without additional prescription.  She has not had recurrence of palpitations since that time. She underwent cardiac MRI 1/28/2023 showing LVEF 64% without fibrosis, infiltration or ischemia.  She underwent MCT 12/14/2022 to 1/12/2023 showing no tachycardia episodes with <1% SVEs and <1% VEs.  She denies recurrent episodes similar to her 11/23/2022 episode - on 1/20/2023 she notes different symptoms of palpitations after making a change to her nighttime sedative medications.  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  She notes some palpitations thereafter and was started on diltiazem CD 120mg daily.  She  notes improved palpitations though has struggled with sleepiness thereafter.       She presents to the [facility] clinic today to discuss ongoing issues with constipation which she thinks is directly related to her diltiazem medication. 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  She stopped taking the diltiazem 4 days ago and she notes some improvement with her constipation since that time.  Heart rates remain stable in the 60s, initial blood pressure today on arrival to the clinic is elevated with systolic in the 160s, recheck 10 minutes later is normotensive.  She denies any recent palpitations, chest pain, dizziness or lightheadedness, syncopal episodes, orthopnea or PND, denies any recent shortness of breath on exertion or at rest.  In the past, she has not been able to tolerate metoprolol as it did leave her feeling fatigue, palpitations, left her feeling anxious, unable to relax so she is not wanting to try the metoprolol going forward.  She does admit that she is terrible about drinking water, she is only taking in about 30 ounces daily at this time.  She does not drink caffeine.  She does take in about 2 glasses of wine per week.  She is not exercising at this time due to dealing with some bone-on-bone in one of her knees.  She is not currently using any over-the-counter fiber supplements to induce regular bowel movements.  She has been taking milk of magnesia to induce bowel movements but this resulted in liquid diarrhea so she stopped using the supplement.  For the last couple of days, her bowel movements have been formed/hard and she has still had to strain.  She does use CBD Gummies at night to help her sleep, she is able to sleep about 6 to 7 hours but her sleep has been interrupted, she has been getting up to use the restroom to urinate.  She is scheduled to see her urologist in August due to increased urinary frequency issues and questionable uterine  prolapse.        Cardiographics (reviewed):    EKG 3/16/23 shows NSR rate 80 QT/QTc: 412/475 ms           ECHO 3/13/23    Interpretation Summary     The right ventricle is normal size.  The right ventricular systolic function is normal.  The left ventricle is normal in size.  The visual ejection fraction is 50-55%.  No regional wall motion abnormalities noted.  IVC diameter <2.1 cm collapsing >50% with sniff suggests a normal RA pressure  of 3 mmHg.  Sinus rhythm was noted.  No hemodynamically significant valvular abnormalities on 2D or color flow  Imaging      MCOT 1/1/2023      MCOT 12/14/2022       Problem List:  Patient Active Problem List   Diagnosis     DDD (degenerative disc disease), cervical     Acute pancreatitis     Dysuria     GERD (gastroesophageal reflux disease)     Hip bursitis     Meningioma (H)     Osteoarthrosis     Venous tributary (branch) occlusion of retina     Syncope     Constipation, unspecified constipation type     Acute cystitis without hematuria     Yeast vaginitis     Acquired pronation of foot     Bursitis     Cataract     Chronic urinary tract infection     Congenital pes planus     Dehydration     Disorder of bone and articular cartilage     History of total knee arthroplasty, right     Lichenoid dermatitis     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     Mixed hyperlipidemia     Moderate major depression (H)     Neuropathy     Osteopenia     Painful urging to urinate     Paroxysmal SVT (supraventricular tachycardia) (H)     Peripheral sensory neuropathy     Prerenal azotemia     Primary malignant neoplasm of skin     Primary osteoarthritis of left knee     Pruritus of vulva     Rosacea     S/P colonoscopy     S/P left cataract extraction     S/P lumbar spinal fusion     Surgical aftercare, musculoskeletal system     Trigger index finger of right hand     SVT (supraventricular tachycardia) (H)     Syncope, unspecified syncope type     Weakness of right lower extremity      Constipation     Wide-complex tachycardia     Revi  e  Physical Examination Review of Systems   w stems  There were no vitals taken for this visit.  There is no height or weight on file to calculate BMI.  Wt Readings from Last 3 Encounters:   06/12/23 69.9 kg (154 lb)   04/26/23 71 kg (156 lb 8 oz)   03/17/23 67.9 kg (149 lb 11.2 oz)     General Appearance:   Alert, well-appearing and in no acute distress.   HEENT: Atraumatic, normocephalic.  No scleral icterus, normal conjunctivae; mucous membranes pink and moist.     Chest: Chest symmetric, spine straight.   Lungs:   Respirations unlabored: Lungs are clear to auscultation.   Cardiovascular:   Normal first and second heart sounds with no murmurs, rubs, or gallops.  Regular, regular.   Normal JVD, no edema.       Extremities: No cyanosis or clubbing   Musculoskeletal: Moves all extremities   Skin: Warm, dry, intact.    Neurologic: Mood and affect are appropriate, alert and oriented to person, place, time, and situation     ROS: 10 point ROS neg other than the symptoms noted above in the HPI.     Medical History  Surgical History Family History Social History     Past Medical History:   Diagnosis Date     Acute pancreatitis 04/26/2013     Allergy      DDD (degenerative disc disease), cervical 06/11/2008     Depressive disorder      Dysuria 07/17/2021     GERD (gastroesophageal reflux disease) 08/26/2010     Hip bursitis 07/13/2012     Meningioma (H) 08/26/2010     Osteoarthrosis 04/26/2013     SVT (supraventricular tachycardia) (H)      Syncope      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 LEFT EYE Occurred at time of IOL/ERM    Past Surgical History:   Procedure Laterality Date     APPENDECTOMY OPEN       EP ABLATION SVT N/A 3/16/2023    Procedure: Ablation Supraventricular Tachycardia;  Surgeon: Aime Cobian MD;  Location: [facility]     EP COMPREHENSIVE EP STUDY N/A  3/16/2023    Procedure: Comprehensive Study;  Surgeon: Aime Cobian MD;  Location: [facility]     GYN SURGERY       ORTHOPEDIC SURGERY      No family history on file. History   Smoking Status     Former     Types: Cigarettes     Quit date: 1973   Smokeless Tobacco     Never     Social History    Substance and Sexual Activity      Alcohol use: Never        Comment: social       Medications  Allergies     Current Outpatient Medications   Medication Sig Dispense Refill     acetaminophen (TYLENOL) 500 MG tablet Take 500-1,000 mg by mouth every 6 hours as needed for mild pain       calcium carbonate (OS-KINJAL) 1500 (600 Ca) MG tablet Take 600 mg by mouth daily       Calcium Carbonate-Vitamin D 600-5 MG-MCG CAPS Take 1 capsule by mouth daily       Cholecalciferol 10 MCG (400 UNIT) CAPS Take 400 Units by mouth daily       diltiazem ER (TIAZAC) 120 MG 24 hr ER beaded capsule Take 1 capsule (120 mg) by mouth daily 30 capsule 6     fluticasone (FLONASE) 50 MCG/ACT nasal spray Spray 1 spray into both nostrils daily as needed       LORazepam (ATIVAN) 0.5 MG tablet TAKE ONE TABLET BY MOUTH AT BEDTIME AS NEEDED FOR ANXIETY (Patient not taking: Reported on 6/12/2023)       metoprolol tartrate (LOPRESSOR) 25 MG tablet Take 1 tablet (25 mg) by mouth 2 times daily for 30 days 60 tablet 0     multivitamin, therapeutic with minerals (THERA-VIT-M) TABS Take 1 tablet by mouth daily.         omeprazole (PRILOSEC) 40 MG DR capsule Take 40 mg by mouth daily       polyethylene glycol (MIRALAX) 17 GM/Dose powder Take 17 g by mouth daily as needed for constipation (Patient not taking: Reported on 6/12/2023) 510 g      pravastatin (PRAVACHOL) 10 MG tablet Take 10 mg by mouth At Bedtime        Allergies   Allergen Reactions     Levofloxacin Other (See Comments), Dizziness, Nausea and Unknown     dizziness  Patient unable to tolerate side effects    Patient unable to tolerate side effects  dizziness  Patient unable to tolerate side  effects       Hydrocodone-Acetaminophen Other (See Comments) and Unknown     Dizziness, fainting  Other reaction(s): Syncope  Vasovagal episode      Vasovagal episode  Dizziness, fainting  Other reaction(s): Syncope  Vasovagal episode     Hydroxyzine      Kept patient up instead of sleeping     Lisinopril Cough     Oxycodone-Acetaminophen Other (See Comments), Dizziness and Rash     Dizziness, fainting      Medical, surgical, family, social history, and medications were all reviewed and updated as necessary.   Lab Results    Chemistry/lipid CBC Cardiac Enzymes/BNP/TSH/INR   No results for input(s): CHOL, HDL, LDL, TRIG, CHOLHDLRATIO in the last 38359 hours.  No results for input(s): LDL in the last 19447 hours.  Recent Labs   Lab Test 03/17/23  0443   *   POTASSIUM 3.9   CHLORIDE 103   CO2 22   GLC 91   BUN 14.3   CR 0.60   GFRESTIMATED 88   KINJAL 8.9     Recent Labs   Lab Test 03/17/23  0443 03/15/23  0449 03/14/23  0445   CR 0.60 0.65 0.60     No results for input(s): A1C in the last 22759 hours.       Recent Labs   Lab Test 03/14/23  0445   WBC 6.5   HGB 12.0   HCT 36.6   MCV 93        Recent Labs   Lab Test 03/14/23  0445 03/13/23  1315 01/01/23  1506   HGB 12.0 12.7 12.4    Recent Labs   Lab Test 03/13/23  1315 01/01/23  1506 06/21/22  2301   TROPONINI <0.01 <0.01 <0.01     Recent Labs   Lab Test 06/21/22 2001   BNP 44     Recent Labs   Lab Test 03/13/23  1315   TSH 2.20     Recent Labs   Lab Test 03/13/23  1315   INR 1.00          Total Time- 47 minutes spent on date of encounter doing chart review, history and exam, documentation and further activities as noted above.  This note has been dictated using voice recognition software. Any grammatical, typographical, or context distortions are unintentional and inherent to the software.    Madie Hendricks [facility]  [phone]

## 2023-07-03 ENCOUNTER — OFFICE VISIT (OUTPATIENT)
Dept: CARDIOLOGY | Facility: CLINIC | Age: 83
End: 2023-07-03
Payer: COMMERCIAL

## 2023-07-03 VITALS
RESPIRATION RATE: 16 BRPM | DIASTOLIC BLOOD PRESSURE: 80 MMHG | BODY MASS INDEX: 26.3 KG/M2 | SYSTOLIC BLOOD PRESSURE: 130 MMHG | HEART RATE: 69 BPM | OXYGEN SATURATION: 98 % | WEIGHT: 161 LBS

## 2023-07-03 DIAGNOSIS — R00.0 WIDE-COMPLEX TACHYCARDIA: ICD-10-CM

## 2023-07-03 DIAGNOSIS — I47.10 SVT (SUPRAVENTRICULAR TACHYCARDIA) (H): Primary | ICD-10-CM

## 2023-07-03 DIAGNOSIS — R00.0 SINUS TACHYCARDIA: ICD-10-CM

## 2023-07-03 DIAGNOSIS — K59.03 DRUG-INDUCED CONSTIPATION: ICD-10-CM

## 2023-07-03 PROCEDURE — 99215 OFFICE O/P EST HI 40 MIN: CPT | Performed by: NURSE PRACTITIONER

## 2023-07-03 RX ORDER — METOPROLOL SUCCINATE 25 MG/1
25 TABLET, EXTENDED RELEASE ORAL DAILY
Qty: 90 TABLET | Refills: 1 | Status: SHIPPED | OUTPATIENT
Start: 2023-07-03 | End: 2023-07-03

## 2023-07-03 RX ORDER — DILTIAZEM HYDROCHLORIDE 120 MG/1
120 CAPSULE, EXTENDED RELEASE ORAL DAILY
Qty: 30 CAPSULE | Refills: 6
Start: 2023-07-03 | End: 2023-07-31

## 2023-07-03 NOTE — PATIENT INSTRUCTIONS
Anabelle Cameron,    It was a pleasure to see you today at the [facility].     My recommendations after this visit include:    Continue with Diltiazem for now same daily dose  Work on increasing water intake to 64 ounces daily = 4 bottles daily   Grab some Fiber Well gummies, take 4 daily to equal 10 grams of fiber daily, the rest should be through diet  This will help regulate your bowels  Do your best with daily activity, this will help to keep things moving as well.       Madie Hendricks CNP  [facility], Electrophysiology  [phone]  EP nurses [phone]

## 2023-07-03 NOTE — LETTER
7/3/2023    Jenise Christensen MD  [address]  [facility] [address]    RE: Anabelle TERRY Rakesh       Dear Colleague,     I had the pleasure of seeing Anabelle Cameron in the [facility].    Thank you, Cristian Gamble CNP, for asking the [facility] team to see Ms. 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  Suspected SVT with aberrancy (typical RBBB morphology) over VT given normal cardiac MRI  Cardiac MRI 1/28/2023 showing LVEF 64% without fibrosis, infiltration or ischemia.    Status post EP study showing slow-fast AVNRT and slow pathway ablation 3/16/2023  Patient thinks her constipation is directly related to her Diltiazem medication so she stopped taking it 4 days ago.  Previously, she did experience fatigue being on metoprolol and she recalls experiencing some further side effects being on metoprolol which included palpitations and not feeling quite right so she is hesitant to go back on metoprolol at this time.  We had a long discussion today about constipation and if she can get a handle on the constipation, recommend that she stay on diltiazem at this time since she has had no recurrence of SVT or palpitations since her ablation.  Resume Diltiazem  mg daily  She is willing to stay on the diltiazem for now and try the fiber well Gummies, 10 g daily.  She will get the remainder of her fiber through her diet.  She is also lacking in water intake daily, she should aim for 64 ounces daily, she is currently getting around 30 ounces per day.  She is also lacking  in physical activity daily due to dealing with some bone-on-bone in her knee. Activity as tolerated.   She will avoid further use of milk of magnesia at this time since it did result in liquid diarrhea.           Drug-induced constipation    Patient thinks her constipation is directly related to her Diltiazem medication  Discussed the importance of hydration, adequate fiber intake and regular physical activity  Aim for 20 -25 grams of fiber daily, recommend Fiber Well gummies daily  She was provided with educational materials on how to manage constipation at home  If she continues to experience constipation, recommend follow up with PCP, she is stable from cardiology standpoint.         Follow up PRN for SVT issues. Constipation issues should be directed to PCP going forward.      History of Present Illness/Subjective    Anabelle Cameron is a 82 year old female with tachycardia  status post EP study showing slow-fast AVNRT and slow pathway ablation 3/16/2023, syncope, meningioma, dyslipidemia, degenerative disk disease, anxiety presenting for follow-up regarding constipation which she thinks is directly related to her Diltiazem.      Mrs. Cameron notes acute first onset of palpitations and lightheadedness while a passenger in a car in the setting of several weeks of URI symptoms, and on 11/23/2022 underwent ER evaluation with note of episodes of regular RBBB tachycardia lasting for 15s.  She was treated with metoprolol 5mg IV with subsequent quiescence, and was discharged home without additional prescription.  She has not had recurrence of palpitations since that time. She underwent cardiac MRI 1/28/2023 showing LVEF 64% without fibrosis, infiltration or ischemia.  She underwent MCT 12/14/2022 to 1/12/2023 showing no tachycardia episodes with <1% SVEs and <1% VEs.  She denies recurrent episodes similar to her 11/23/2022 episode - on 1/20/2023 she notes different symptoms of palpitations after making a change to her  nighttime sedative medications.  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  She notes some palpitations thereafter and was started on diltiazem CD 120mg daily.  She notes improved palpitations though has struggled with sleepiness thereafter.       She presents to the [facility] clinic today to discuss ongoing issues with constipation which she thinks is directly related to her diltiazem medication. 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  She stopped taking the diltiazem 4 days ago and she notes some improvement with her constipation since that time.  Heart rates remain stable in the 60s, initial blood pressure today on arrival to the clinic is elevated with systolic in the 160s, recheck 10 minutes later is normotensive.  She denies any recent palpitations, chest pain, dizziness or lightheadedness, syncopal episodes, orthopnea or PND, denies any recent shortness of breath on exertion or at rest.  In the past, she has not been able to tolerate metoprolol as it did leave her feeling fatigue, palpitations, left her feeling anxious, unable to relax so she is not wanting to try the metoprolol going forward.  She does admit that she is terrible about drinking water, she is only taking in about 30 ounces daily at this time.  She does not drink caffeine.  She does take in about 2 glasses of wine per week.  She is not exercising at this time due to dealing with some bone-on-bone in one of her knees.  She is not currently using any over-the-counter fiber supplements to induce regular bowel movements.  She has been taking milk of magnesia to induce bowel movements but this resulted in liquid diarrhea so she stopped using the supplement.  For the last couple of days, her bowel movements have been formed/hard and she has still had to strain.  She does use CBD Gummies at night to help her sleep, she is able to sleep about 6 to 7 hours but  her sleep has been interrupted, she has been getting up to use the restroom to urinate.  She is scheduled to see her urologist in August due to increased urinary frequency issues and questionable uterine prolapse.        Cardiographics (reviewed):    EKG 3/16/23 shows NSR rate 80 QT/QTc: 412/475 ms           ECHO 3/13/23    Interpretation Summary     The right ventricle is normal size.  The right ventricular systolic function is normal.  The left ventricle is normal in size.  The visual ejection fraction is 50-55%.  No regional wall motion abnormalities noted.  IVC diameter <2.1 cm collapsing >50% with sniff suggests a normal RA pressure  of 3 mmHg.  Sinus rhythm was noted.  No hemodynamically significant valvular abnormalities on 2D or color flow  Imaging      MCOT 1/1/2023      MCOT 12/14/2022       Problem List:  Patient Active Problem List   Diagnosis    DDD (degenerative disc disease), cervical    Acute pancreatitis    Dysuria    GERD (gastroesophageal reflux disease)    Hip bursitis    Meningioma (H)    Osteoarthrosis    Venous tributary (branch) occlusion of retina    Syncope    Constipation, unspecified constipation type    Acute cystitis without hematuria    Yeast vaginitis    Acquired pronation of foot    Bursitis    Cataract    Chronic urinary tract infection    Congenital pes planus    Dehydration    Disorder of bone and articular cartilage    History of total knee arthroplasty, right    Lichenoid dermatitis    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    Mixed hyperlipidemia    Moderate major depression (H)    Neuropathy    Osteopenia    Painful urging to urinate    Paroxysmal SVT (supraventricular tachycardia) (H)    Peripheral sensory neuropathy    Prerenal azotemia    Primary malignant neoplasm of skin    Primary osteoarthritis of left knee    Pruritus of vulva    Rosacea    S/P colonoscopy    S/P left cataract extraction    S/P lumbar spinal fusion    Surgical aftercare, musculoskeletal system     Trigger index finger of right hand    SVT (supraventricular tachycardia) (H)    Syncope, unspecified syncope type    Weakness of right lower extremity    Constipation    Wide-complex tachycardia     Revi  e  Physical Examination Review of Systems   w fystems  There were no vitals taken for this visit.  There is no height or weight on file to calculate BMI.  Wt Readings from Last 3 Encounters:   06/12/23 69.9 kg (154 lb)   04/26/23 71 kg (156 lb 8 oz)   03/17/23 67.9 kg (149 lb 11.2 oz)     General Appearance:   Alert, well-appearing and in no acute distress.   HEENT: Atraumatic, normocephalic.  No scleral icterus, normal conjunctivae; mucous membranes pink and moist.     Chest: Chest symmetric, spine straight.   Lungs:   Respirations unlabored: Lungs are clear to auscultation.   Cardiovascular:   Normal first and second heart sounds with no murmurs, rubs, or gallops.  Regular, regular.   Normal JVD, no edema.       Extremities: No cyanosis or clubbing   Musculoskeletal: Moves all extremities   Skin: Warm, dry, intact.    Neurologic: Mood and affect are appropriate, alert and oriented to person, place, time, and situation     ROS: 10 point ROS neg other than the symptoms noted above in the HPI.     Medical History  Surgical History Family History Social History     Past Medical History:   Diagnosis Date    Acute pancreatitis 04/26/2013    Allergy     DDD (degenerative disc disease), cervical 06/11/2008    Depressive disorder     Dysuria 07/17/2021    GERD (gastroesophageal reflux disease) 08/26/2010    Hip bursitis 07/13/2012    Meningioma (H) 08/26/2010    Osteoarthrosis 04/26/2013    SVT (supraventricular tachycardia) (H)     Syncope     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 LEFT EYE Occurred at time of IOL/ERM    Past Surgical History:   Procedure Laterality Date    APPENDECTOMY OPEN      EP ABLATION SVT N/A 3/16/2023    Procedure:  Ablation Supraventricular Tachycardia;  Surgeon: Aime Cobian MD;  Location: [facility]    EP COMPREHENSIVE EP STUDY N/A 3/16/2023    Procedure: Comprehensive Study;  Surgeon: Aime Cobian MD;  Location: [facility]    GYN SURGERY      ORTHOPEDIC SURGERY      No family history on file. History   Smoking Status    Former    Types: Cigarettes    Quit date: 1973   Smokeless Tobacco    Never     Social History    Substance and Sexual Activity      Alcohol use: Never        Comment: social       Medications  Allergies     Current Outpatient Medications   Medication Sig Dispense Refill    acetaminophen (TYLENOL) 500 MG tablet Take 500-1,000 mg by mouth every 6 hours as needed for mild pain      calcium carbonate (OS-KINJAL) 1500 (600 Ca) MG tablet Take 600 mg by mouth daily      Calcium Carbonate-Vitamin D 600-5 MG-MCG CAPS Take 1 capsule by mouth daily      Cholecalciferol 10 MCG (400 UNIT) CAPS Take 400 Units by mouth daily      diltiazem ER (TIAZAC) 120 MG 24 hr ER beaded capsule Take 1 capsule (120 mg) by mouth daily 30 capsule 6    fluticasone (FLONASE) 50 MCG/ACT nasal spray Spray 1 spray into both nostrils daily as needed      LORazepam (ATIVAN) 0.5 MG tablet TAKE ONE TABLET BY MOUTH AT BEDTIME AS NEEDED FOR ANXIETY (Patient not taking: Reported on 6/12/2023)      metoprolol tartrate (LOPRESSOR) 25 MG tablet Take 1 tablet (25 mg) by mouth 2 times daily for 30 days 60 tablet 0    multivitamin, therapeutic with minerals (THERA-VIT-M) TABS Take 1 tablet by mouth daily.        omeprazole (PRILOSEC) 40 MG DR capsule Take 40 mg by mouth daily      polyethylene glycol (MIRALAX) 17 GM/Dose powder Take 17 g by mouth daily as needed for constipation (Patient not taking: Reported on 6/12/2023) 510 g     pravastatin (PRAVACHOL) 10 MG tablet Take 10 mg by mouth At Bedtime        Allergies   Allergen Reactions    Levofloxacin Other (See Comments), Dizziness, Nausea and Unknown     dizziness  Patient  unable to tolerate side effects    Patient unable to tolerate side effects  dizziness  Patient unable to tolerate side effects      Hydrocodone-Acetaminophen Other (See Comments) and Unknown     Dizziness, fainting  Other reaction(s): Syncope  Vasovagal episode      Vasovagal episode  Dizziness, fainting  Other reaction(s): Syncope  Vasovagal episode    Hydroxyzine      Kept patient up instead of sleeping    Lisinopril Cough    Oxycodone-Acetaminophen Other (See Comments), Dizziness and Rash     Dizziness, fainting      Medical, surgical, family, social history, and medications were all reviewed and updated as necessary.   Lab Results    Chemistry/lipid CBC Cardiac Enzymes/BNP/TSH/INR   No results for input(s): CHOL, HDL, LDL, TRIG, CHOLHDLRATIO in the last 50564 hours.  No results for input(s): LDL in the last 59502 hours.  Recent Labs   Lab Test 03/17/23  0443   *   POTASSIUM 3.9   CHLORIDE 103   CO2 22   GLC 91   BUN 14.3   CR 0.60   GFRESTIMATED 88   KINJAL 8.9     Recent Labs   Lab Test 03/17/23  0443 03/15/23  0449 03/14/23  0445   CR 0.60 0.65 0.60     No results for input(s): A1C in the last 60455 hours.       Recent Labs   Lab Test 03/14/23  0445   WBC 6.5   HGB 12.0   HCT 36.6   MCV 93        Recent Labs   Lab Test 03/14/23  0445 03/13/23  1315 01/01/23  1506   HGB 12.0 12.7 12.4    Recent Labs   Lab Test 03/13/23  1315 01/01/23  1506 06/21/22  2301   TROPONINI <0.01 <0.01 <0.01     Recent Labs   Lab Test 06/21/22 2001   BNP 44     Recent Labs   Lab Test 03/13/23  1315   TSH 2.20     Recent Labs   Lab Test 03/13/23  1315   INR 1.00          Total Time- 47 minutes spent on date of encounter doing chart review, history and exam, documentation and further activities as noted above.  This note has been dictated using voice recognition software. Any grammatical, typographical, or context distortions are unintentional and inherent to the software.    Madie Hendricks [facility]  [facility]  [phone]      Thank you for allowing me to participate in the care of your patient.      Sincerely,     Madie Hendricks NP     [facility]  cc:   No referring provider defined for this encounter.

## 2023-07-18 ENCOUNTER — TRANSFERRED RECORDS (OUTPATIENT)
Dept: FAMILY MEDICINE | Facility: CLINIC | Age: 83
End: 2023-07-18
Payer: COMMERCIAL

## 2023-07-31 DIAGNOSIS — R00.0 SINUS TACHYCARDIA: ICD-10-CM

## 2023-07-31 RX ORDER — DILTIAZEM HYDROCHLORIDE 120 MG/1
120 CAPSULE, EXTENDED RELEASE ORAL DAILY
Qty: 30 CAPSULE | Refills: 6 | Status: SHIPPED | OUTPATIENT
Start: 2023-07-31 | End: 2023-09-17

## 2023-09-17 ENCOUNTER — APPOINTMENT (OUTPATIENT)
Dept: CT IMAGING | Facility: CLINIC | Age: 83
End: 2023-09-17
Attending: EMERGENCY MEDICINE
Payer: COMMERCIAL

## 2023-09-17 ENCOUNTER — APPOINTMENT (OUTPATIENT)
Dept: MRI IMAGING | Facility: CLINIC | Age: 83
End: 2023-09-17
Attending: EMERGENCY MEDICINE
Payer: COMMERCIAL

## 2023-09-17 ENCOUNTER — HOSPITAL ENCOUNTER (EMERGENCY)
Facility: CLINIC | Age: 83
Discharge: HOME | End: 2023-09-17
Attending: EMERGENCY MEDICINE | Admitting: EMERGENCY MEDICINE
Payer: COMMERCIAL

## 2023-09-17 VITALS
SYSTOLIC BLOOD PRESSURE: 135 MMHG | HEART RATE: 82 BPM | TEMPERATURE: 96.6 F | DIASTOLIC BLOOD PRESSURE: 73 MMHG | RESPIRATION RATE: 17 BRPM | OXYGEN SATURATION: 95 %

## 2023-09-17 DIAGNOSIS — R42 VERTIGO: ICD-10-CM

## 2023-09-17 LAB
ANION GAP SERPL CALCULATED.3IONS-SCNC: 10 MMOL/L (ref 7–15)
APTT PPP: 24 SECONDS (ref 22–38)
ATRIAL RATE - MUSE: 90 BPM
BUN SERPL-MCNC: 19.5 MG/DL (ref 8–23)
CALCIUM SERPL-MCNC: 8.8 MG/DL (ref 8.8–10.2)
CHLORIDE SERPL-SCNC: 101 MMOL/L (ref 98–107)
CREAT SERPL-MCNC: 0.59 MG/DL (ref 0.51–0.95)
DEPRECATED HCO3 PLAS-SCNC: 22 MMOL/L (ref 22–29)
DIASTOLIC BLOOD PRESSURE - MUSE: NORMAL MMHG
EGFRCR SERPLBLD CKD-EPI 2021: 88 ML/MIN/1.73M2
GLUCOSE BLDC GLUCOMTR-MCNC: 115 MG/DL (ref 70–99)
GLUCOSE SERPL-MCNC: 111 MG/DL (ref 70–99)
INR PPP: 0.95 (ref 0.85–1.15)
INTERPRETATION ECG - MUSE: NORMAL
P AXIS - MUSE: 57 DEGREES
POTASSIUM SERPL-SCNC: 3.8 MMOL/L (ref 3.4–5.3)
PR INTERVAL - MUSE: 180 MS
QRS DURATION - MUSE: 82 MS
QT - MUSE: 402 MS
QTC - MUSE: 491 MS
R AXIS - MUSE: 22 DEGREES
SODIUM SERPL-SCNC: 133 MMOL/L (ref 136–145)
SYSTOLIC BLOOD PRESSURE - MUSE: NORMAL MMHG
T AXIS - MUSE: 64 DEGREES
TROPONIN T SERPL HS-MCNC: 12 NG/L
VENTRICULAR RATE- MUSE: 90 BPM

## 2023-09-17 PROCEDURE — 99285 EMERGENCY DEPT VISIT HI MDM: CPT | Mod: 25

## 2023-09-17 PROCEDURE — 96361 HYDRATE IV INFUSION ADD-ON: CPT

## 2023-09-17 PROCEDURE — 250N000013 HC RX MED GY IP 250 OP 250 PS 637: Performed by: EMERGENCY MEDICINE

## 2023-09-17 PROCEDURE — 84484 ASSAY OF TROPONIN QUANT: CPT | Performed by: EMERGENCY MEDICINE

## 2023-09-17 PROCEDURE — 250N000011 HC RX IP 250 OP 636: Mod: JZ | Performed by: EMERGENCY MEDICINE

## 2023-09-17 PROCEDURE — 0042T CT HEAD PERFUSION W CONTRAST: CPT

## 2023-09-17 PROCEDURE — A9585 GADOBUTROL INJECTION: HCPCS | Mod: JZ | Performed by: EMERGENCY MEDICINE

## 2023-09-17 PROCEDURE — 250N000011 HC RX IP 250 OP 636: Performed by: EMERGENCY MEDICINE

## 2023-09-17 PROCEDURE — 96375 TX/PRO/DX INJ NEW DRUG ADDON: CPT

## 2023-09-17 PROCEDURE — 80048 BASIC METABOLIC PNL TOTAL CA: CPT | Performed by: EMERGENCY MEDICINE

## 2023-09-17 PROCEDURE — 36415 COLL VENOUS BLD VENIPUNCTURE: CPT | Performed by: EMERGENCY MEDICINE

## 2023-09-17 PROCEDURE — 93005 ELECTROCARDIOGRAM TRACING: CPT | Performed by: EMERGENCY MEDICINE

## 2023-09-17 PROCEDURE — 82962 GLUCOSE BLOOD TEST: CPT

## 2023-09-17 PROCEDURE — 258N000003 HC RX IP 258 OP 636: Performed by: EMERGENCY MEDICINE

## 2023-09-17 PROCEDURE — 96374 THER/PROPH/DIAG INJ IV PUSH: CPT | Mod: 59

## 2023-09-17 PROCEDURE — 70553 MRI BRAIN STEM W/O & W/DYE: CPT

## 2023-09-17 PROCEDURE — 85610 PROTHROMBIN TIME: CPT | Performed by: EMERGENCY MEDICINE

## 2023-09-17 PROCEDURE — 70450 CT HEAD/BRAIN W/O DYE: CPT

## 2023-09-17 PROCEDURE — 255N000002 HC RX 255 OP 636: Mod: JZ | Performed by: EMERGENCY MEDICINE

## 2023-09-17 PROCEDURE — 70498 CT ANGIOGRAPHY NECK: CPT

## 2023-09-17 PROCEDURE — 70496 CT ANGIOGRAPHY HEAD: CPT

## 2023-09-17 PROCEDURE — 85730 THROMBOPLASTIN TIME PARTIAL: CPT | Performed by: EMERGENCY MEDICINE

## 2023-09-17 RX ORDER — IOPAMIDOL 755 MG/ML
100 INJECTION, SOLUTION INTRAVASCULAR ONCE
Status: COMPLETED | OUTPATIENT
Start: 2023-09-17 | End: 2023-09-17

## 2023-09-17 RX ORDER — LORAZEPAM 2 MG/ML
1 INJECTION INTRAMUSCULAR ONCE
Status: COMPLETED | OUTPATIENT
Start: 2023-09-17 | End: 2023-09-17

## 2023-09-17 RX ORDER — POLYETHYLENE GLYCOL 3350 17 G/17G
1 POWDER, FOR SOLUTION ORAL DAILY
COMMUNITY

## 2023-09-17 RX ORDER — ONDANSETRON 4 MG/1
4 TABLET, ORALLY DISINTEGRATING ORAL EVERY 8 HOURS PRN
Qty: 10 TABLET | Refills: 0 | Status: SHIPPED | OUTPATIENT
Start: 2023-09-17 | End: 2023-09-20

## 2023-09-17 RX ORDER — MECLIZINE HYDROCHLORIDE 25 MG/1
25 TABLET ORAL 3 TIMES DAILY PRN
Qty: 20 TABLET | Refills: 0 | Status: SHIPPED | OUTPATIENT
Start: 2023-09-17

## 2023-09-17 RX ORDER — GADOBUTROL 604.72 MG/ML
7 INJECTION INTRAVENOUS ONCE
Status: COMPLETED | OUTPATIENT
Start: 2023-09-17 | End: 2023-09-17

## 2023-09-17 RX ORDER — IOPAMIDOL 755 MG/ML
75 INJECTION, SOLUTION INTRAVASCULAR ONCE
Status: COMPLETED | OUTPATIENT
Start: 2023-09-17 | End: 2023-09-17

## 2023-09-17 RX ORDER — ONDANSETRON 2 MG/ML
4 INJECTION INTRAMUSCULAR; INTRAVENOUS ONCE
Status: COMPLETED | OUTPATIENT
Start: 2023-09-17 | End: 2023-09-17

## 2023-09-17 RX ORDER — MECLIZINE HYDROCHLORIDE 25 MG/1
25 TABLET ORAL ONCE
Status: COMPLETED | OUTPATIENT
Start: 2023-09-17 | End: 2023-09-17

## 2023-09-17 RX ORDER — ACETAMINOPHEN 325 MG/1
975 TABLET ORAL ONCE
Status: COMPLETED | OUTPATIENT
Start: 2023-09-17 | End: 2023-09-17

## 2023-09-17 RX ADMIN — ACETAMINOPHEN 975 MG: 325 TABLET ORAL at 07:51

## 2023-09-17 RX ADMIN — SODIUM CHLORIDE 1000 ML: 9 INJECTION, SOLUTION INTRAVENOUS at 10:55

## 2023-09-17 RX ADMIN — MECLIZINE HYDROCHLORIDE 25 MG: 25 TABLET ORAL at 07:33

## 2023-09-17 RX ADMIN — LORAZEPAM 1 MG: 2 INJECTION INTRAMUSCULAR; INTRAVENOUS at 08:04

## 2023-09-17 RX ADMIN — IOPAMIDOL 75 ML: 755 INJECTION, SOLUTION INTRAVENOUS at 07:03

## 2023-09-17 RX ADMIN — ONDANSETRON 4 MG: 2 INJECTION INTRAMUSCULAR; INTRAVENOUS at 06:49

## 2023-09-17 RX ADMIN — GADOBUTROL 7 ML: 604.72 INJECTION INTRAVENOUS at 08:39

## 2023-09-17 RX ADMIN — IOPAMIDOL 100 ML: 755 INJECTION, SOLUTION INTRAVENOUS at 07:04

## 2023-09-17 ASSESSMENT — ACTIVITIES OF DAILY LIVING (ADL)
ADLS_ACUITY_SCORE: 35

## 2023-09-17 ASSESSMENT — ENCOUNTER SYMPTOMS
LIGHT-HEADEDNESS: 1
NUMBNESS: 0
DIZZINESS: 1
SHORTNESS OF BREATH: 0

## 2023-09-17 NOTE — ED NOTES
Pt reports the dizziness has calmed down. Pt reports that when she turns to the right she feels increase in dizziness. Pt still has a mild headache.

## 2023-09-17 NOTE — ED PROVIDER NOTES
EMERGENCY DEPARTMENT ENCOUNTER      NAME: Anabelle Cameron  AGE: 82 year old female  YOB: 1940  MRN: [id number]  EVALUATION DATE & TIME: 9/17/2023  6:22 AM    PCP: Jenise Christensen    ED PROVIDER: Shemar Agustin MD      Chief Complaint   Patient presents with    Dizziness         FINAL IMPRESSION:  1. Vertigo          ED COURSE & MEDICAL DECISION MAKING:    Pertinent Labs & Imaging studies reviewed. (See chart for details)  82 year old female presents to the Emergency Department for evaluation of dizziness.  Patient reports she was in her usual state of health when she went to bed at approximately 11:00 PM last night.  At 04 100 awoke with severe vertiginous symptoms.  Unable to ambulate.  Has to keep her eyes closed due to the severity of her symptoms.  I assessed the patient on arrival to the emergency department.  She was noted to have horizontal nystagmus but it was difficult to ascertain the fast beating component as the patient was unable to keep her eyes open for any significant period of time.  Outside of her significant vertiginous symptoms I could not appreciate any other neurological deficit by clinical examination.  Noted to be mildly hypertensive.  History hypertension and high cholesterol not anticoagulated.  No other focal deficits to clinical examination.  Initiated tier 2 stroke code based on timing and symptomatology.  Discussed case with stroke neurology as well as neuroradiology.  Initial CT CTA and CT perfusion were negative.  Overall plan and recommendations from stroke neurology to see with hyperacute MRI.  Patient receiving Zofran and meclizine for management of symptoms screen laboratory testing pending.  ECG normal sinus rhythm.     6:27 AM I met with the patient, obtained history, performed an initial exam, and discussed options and plan for diagnostics and treatment here in the ED.   6:34 AM: I spoke to Dr. Barton, stroke neuro.  7:34 AM I updated stroke neuro regarding the MRI  delay.    7:14 AM  After discussion with stroke neurology request for hyperacute MRI.  That order has been placed.  I attempted to contact MRI with no answer.  In follow-up apparently the a.m. MRI technician did not arrive this morning and is an hour away so we are calling the backup MRI technician to get them in as quickly as possible but this will cause delay to obtaining the hyperacute MRI.  Patient symptoms are stable.  Per stroke radiology no evidence of abnormality on CTA or CT perfusion.    11:02 AM  Patient's MRI as well was negative for any evidence of acute ischemia.  In consultation with stroke neurology we de-escalated the stroke code at this point as her overall clinical history work-up examination was most consistent at this time with a peripheral mediated vertigo.  As we medicated the patient she demonstrated clinical improvement.  It became more apparent that the patient's symptoms were quite positional in nature with movement of the head to the right side escalation to her symptoms.  I think this supports a diagnosis of peripheral mediated vertigo.  We are working hard to get her symptoms under control to the point where we can safely discharge her.  Our barrier at this point is her ability to ambulate safely.  We will continue to medicate and monitor she is currently receiving some fluids.    1:11 PM  When the patient ultimately had good improvement to her symptoms.  She was tolerating oral intake she was ambulatory without issue or assistance.  Clinical history examination work-up consistent with peripheral mediated vertigo.  I think in light of her demonstrated clinical improvement the patient is appropriate for discharge home and outpatient management.  I will prescribe Zofran and meclizine for management of symptoms.  Reviewed return precautions and discussed follow-up plan prior to discharge.    Medical Decision Making    History:  Supplemental history from: Documented in chart, if  applicable  External Record(s) reviewed: Outpatient Record: 06/12/23 [facility] Visit    Work Up:  Chart documentation includes differential considered and any EKGs or imaging independently interpreted by provider, where specified.  In additional to work up documented, I considered the following work up: Documented in chart, if applicable.    External consultation:  Discussion of management with another provider: Documented in chart, if applicable    Complicating factors:  Care impacted by chronic illness: Hyperlipidemia  Care affected by social determinants of health: N/A    Disposition considerations: Discharge. I prescribed additional prescription strength medication(s) as charted. I considered admission, but discharged patient after significant clinical improvement.        At the conclusion of the encounter I discussed the results of all of the tests and the disposition. The questions were answered. The patient or family acknowledged understanding and was agreeable with the care plan.     MEDICATIONS GIVEN IN THE EMERGENCY:  Medications   ondansetron (ZOFRAN) injection 4 mg (4 mg Intravenous $Given 9/17/23 0649)   meclizine (ANTIVERT) tablet 25 mg (25 mg Oral $Given 9/17/23 0733)   iopamidol (ISOVUE-370) solution 75 mL (75 mLs Intravenous $Given 9/17/23 0703)   iopamidol (ISOVUE-370) solution 100 mL (100 mLs Intravenous $Given 9/17/23 0704)   acetaminophen (TYLENOL) tablet 975 mg (975 mg Oral $Given 9/17/23 0751)   LORazepam (ATIVAN) injection 1 mg (1 mg Intravenous $Given 9/17/23 0804)   gadobutrol (GADAVIST) injection 7 mL (7 mLs Intravenous $Given 9/17/23 0839)   sodium chloride 0.9% BOLUS 1,000 mL (0 mLs Intravenous Stopped 9/17/23 1254)       NEW PRESCRIPTIONS STARTED AT TODAY'S ER VISIT  New Prescriptions    MECLIZINE (ANTIVERT) 25 MG TABLET    Take 1 tablet (25 mg) by mouth 3 times daily as needed for dizziness    ONDANSETRON (ZOFRAN ODT) 4 MG ODT TAB    Take 1 tablet (4 mg) by  "mouth every 8 hours as needed for nausea          =================================================================    HPI    Patient information was obtained from: the patient     Use of : N/A         Anabelle Cameron is a 82 year old female with a pertinent history of hyperlipidemia and syncope who presents to this ED by ambulance for evaluation of dizziness. The patient reports that she was laying in bed on her back on the night of 09/16 when she rolled over onto her right side and \"the whole room just started spinning. My body and head were spinning\". She states that she went to bed at around 11:00-11:30 PM and felt fine at that time. She says that this has not happened before and denies having chest pain, shortness of breath or new numbness. She has no known medical problems and does not take blood thinners. The patient does take a medication for her cholesterol. She denies any history of heart attacks or strokes.     Per Chart Review, the patient was seen on 06/12/23 at [facility] in [place] for evaluation of follow-up regarding tachycardia. With regards to her wide complex tachycardia, it was thought to be associated with palpitations without concurrent syncope or pre-syncope, noted for the first time on 11/23/2022.  Suspected SVT with aberrancy (typical RBBB morphology) over VT given normal cardiac MRI. Cardiac MRI from 1/28/2023 showed LVEF 64% without fibrosis, infiltration or ischemia. 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 She was continued on diltiazem CD 120mg daily. With regards to her syncope, her episodes sounded vagally mediated in nature. She was advised to continue follow with her primary care team, with future EP follow-up on an as needed basis.      REVIEW OF SYSTEMS   Review of Systems   Respiratory:  Negative for shortness of breath.    Cardiovascular:  Negative for chest pain.   Neurological:  Positive for dizziness and " light-headedness. Negative for numbness.        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 LEFT EYE Occurred at time of IOL/ERM       PAST SURGICAL HISTORY:  Past Surgical History:   Procedure Laterality Date    APPENDECTOMY OPEN      EP ABLATION SVT N/A 3/16/2023    Procedure: Ablation Supraventricular Tachycardia;  Surgeon: Aime Cobian MD;  [facility]    EP COMPREHENSIVE EP STUDY N/A 3/16/2023    Procedure: Comprehensive Study;  Surgeon: Aime Cobian MD;  Location: [facility] CV    GYN SURGERY      ORTHOPEDIC SURGERY             CURRENT MEDICATIONS:    acetaminophen (TYLENOL) 500 MG tablet  calcium carbonate (OS-KINJAL) 1500 (600 Ca) MG tablet  Cholecalciferol 10 MCG (400 UNIT) CAPS  fluticasone (FLONASE) 50 MCG/ACT nasal spray  HEMP OIL OR EXTRACT OR OTHER CBD CANNABINOID, NOT MEDICAL CANNABIS,  meclizine (ANTIVERT) 25 MG tablet  multivitamin, therapeutic with minerals (THERA-VIT-M) TABS  omeprazole (PRILOSEC) 40 MG DR capsule  ondansetron (ZOFRAN ODT) 4 MG ODT tab  polyethylene glycol (MIRALAX) 17 g packet  pravastatin (PRAVACHOL) 10 MG tablet        ALLERGIES:  Allergies   Allergen Reactions    Levofloxacin Other (See Comments), Dizziness, Nausea and Unknown     dizziness  Patient unable to tolerate side effects    Patient unable to tolerate side effects  dizziness  Patient unable to tolerate side effects      Hydrocodone-Acetaminophen Other (See Comments) and Unknown     Dizziness, fainting  Other  reaction(s): Syncope  Vasovagal episode      Vasovagal episode  Dizziness, fainting  Other reaction(s): Syncope  Vasovagal episode    Hydroxyzine      Kept patient up instead of sleeping    Lisinopril Cough    Oxycodone-Acetaminophen Other (See Comments), Dizziness and Rash     Dizziness, fainting       FAMILY HISTORY:  History reviewed. No pertinent family history.    SOCIAL HISTORY:   Social History     Socioeconomic History    Marital status:    Tobacco Use    Smoking status: Former     Types: Cigarettes     Quit date:      Years since quittin.7    Smokeless tobacco: Never   Vaping Use    Vaping Use: Never used   Substance and Sexual Activity    Alcohol use: Never     Comment: social    Drug use: No       VITALS:  /73   Pulse 82   Temp (!) 96.6  F (35.9  C) (Oral)   Resp 17   SpO2 95%     PHYSICAL EXAM    Constitutional: Well developed, Well nourished, NAD  HENT: Normocephalic, Atraumatic, Bilateral external ears normal, Oropharynx normal, mucous membranes moist, Nose normal. Neck-  Normal range of motion, No tenderness, Supple, No stridor.   Eyes: PERRL, EOMI, Conjunctiva normal, No discharge.  See neuro examination  Respiratory: Normal breath sounds, No respiratory distress, No wheezing, Speaks full sentences easily. No cough.   Cardiovascular: Normal heart rate, Regular rhythm, No murmurs Chest wall nontender.    GI:  Soft, No tenderness, No masses, No flank tenderness. No rebound or guarding.  : No cva tenderness    Musculoskeletal: 2+ DP pulses. No edema. No cyanosis. Good range of motion in all major joints. No tenderness to palpation. No tenderness of the CTLS spine.   Integument: Warm, Dry, No erythema, No rash. No petechiae.   Neurologic: Patient is alert, oriented.  Nystagmus appreciated on clinical examination in the horizontal direction.  No other focal deficits appreciated on clinical examination.  Patient was not ambulated secondary to the severity of her vertiginous  symptoms.  Psychiatric: Affect normal, Judgment normal, Mood normal. Cooperative.        LAB:  All pertinent labs reviewed and interpreted.  Results for orders placed or performed during the hospital encounter of 09/17/23   CT Head w/o Contrast    Impression    IMPRESSION:   HEAD CT:  1.  No acute intracranial abnormality or significant change compared to the prior study.    HEAD CTA:   1.  No high-grade stenosis, branch occlusion, or aneurysm.    NECK CTA:  1.  Normal neck CTA.    CT head and CTA findings were discussed with Dr. Agustin at 0700 on 09/17/2023.       CTA Head Neck with Contrast    Impression    IMPRESSION:   HEAD CT:  1.  No acute intracranial abnormality or significant change compared to the prior study.    HEAD CTA:   1.  No high-grade stenosis, branch occlusion, or aneurysm.    NECK CTA:  1.  Normal neck CTA.    CT head and CTA findings were discussed with Dr. Agustin at 0700 on 09/17/2023.       CT Head Perfusion w Contrast - For Tier 2 Stroke    Impression    IMPRESSION:   CT PERFUSION:  1.  Normal cerebral perfusion.   MR Brain w/o & w Contrast    Impression    IMPRESSION:  1.  No acute intracranial process.  2.  Generalized brain atrophy and presumed microvascular ischemic changes as detailed above.   Basic metabolic panel   Result Value Ref Range    Sodium 133 (L) 136 - 145 mmol/L    Potassium 3.8 3.4 - 5.3 mmol/L    Chloride 101 98 - 107 mmol/L    Carbon Dioxide (CO2) 22 22 - 29 mmol/L    Anion Gap 10 7 - 15 mmol/L    Urea Nitrogen 19.5 8.0 - 23.0 mg/dL    Creatinine 0.59 0.51 - 0.95 mg/dL    Calcium 8.8 8.8 - 10.2 mg/dL    Glucose 111 (H) 70 - 99 mg/dL    GFR Estimate 88 >60 mL/min/1.73m2   Result Value Ref Range    INR 0.95 0.85 - 1.15   Partial thromboplastin time   Result Value Ref Range    aPTT 24 22 - 38 Seconds   Result Value Ref Range    Troponin T, High Sensitivity 12 <=14 ng/L   Glucose by meter   Result Value Ref Range    GLUCOSE BY METER POCT 115 (H) 70 - 99 mg/dL   ECG 12-LEAD  WITH MUSE (LHE)   Result Value Ref Range    Systolic Blood Pressure  mmHg    Diastolic Blood Pressure  mmHg    Ventricular Rate 90 BPM    Atrial Rate 90 BPM    MA Interval 180 ms    QRS Duration 82 ms     ms    QTc 491 ms    P Axis 57 degrees    R AXIS 22 degrees    T Axis 64 degrees    Interpretation ECG       Sinus rhythm  Septal infarct (cited on or before 17-SEP-2023)  Abnormal ECG  When compared with ECG of 16-MAR-2023 16:45,  No significant change was found  Confirmed by SEE ED PROVIDER NOTE FOR, ECG INTERPRETATION ([id number]),  CAMILA BARON ([id number]) on 9/17/2023 7:41:34 AM         RADIOLOGY:  Reviewed all pertinent imaging. Please see official radiology report.  MR Brain w/o & w Contrast   Final Result   IMPRESSION:   1.  No acute intracranial process.   2.  Generalized brain atrophy and presumed microvascular ischemic changes as detailed above.      CT Head Perfusion w Contrast - For Tier 2 Stroke   Final Result   IMPRESSION:    CT PERFUSION:   1.  Normal cerebral perfusion.      CTA Head Neck with Contrast   Final Result   IMPRESSION:    HEAD CT:   1.  No acute intracranial abnormality or significant change compared to the prior study.      HEAD CTA:    1.  No high-grade stenosis, branch occlusion, or aneurysm.      NECK CTA:   1.  Normal neck CTA.      CT head and CTA findings were discussed with Dr. Agustin at 0700 on 09/17/2023.            CT Head w/o Contrast   Final Result   IMPRESSION:    HEAD CT:   1.  No acute intracranial abnormality or significant change compared to the prior study.      HEAD CTA:    1.  No high-grade stenosis, branch occlusion, or aneurysm.      NECK CTA:   1.  Normal neck CTA.      CT head and CTA findings were discussed with Dr. Agustin at 0700 on 09/17/2023.                EKG:    Performed at: 07:15 AM, 9/17/2023    Impression: Sinus rhythm. Septal infarct (cite on or before 09/17/2023). Abnormal ECG.     Rate: 90 bpm  Rhythm: Sinus rhythm  Axis: 22  MA Interval: 180  ms  QRS Interval: 82 ms  QTc Interval: 491 ms  ST Changes: N/A  Comparison: When compared to ECG of 03/16/2023, no significant change was found.    I have independently reviewed and interpreted the EKG(s) documented above.    I, Yina Roxi, am serving as a scribe to document services personally performed by Shemar Agustin MD, based on my observations and the provider's statements to me. I, Shemar Agustin MD, attest that Yina Diane is acting in a scribe capacity, has observed my performance of the services and has documented them in accordance with my direction.    Shemar Agustin MD  [facility] EMERGENCY ROOM  [address] [address] [phone]       Shemar Agustin MD  09/17/23 3757

## 2023-09-17 NOTE — PHARMACY-ADMISSION MEDICATION HISTORY
Pharmacist Admission Medication History    Admission medication history is complete. The information provided in this note is only as accurate as the sources available at the time of the update.    Medication reconciliation/reorder completed by provider prior to medication history? No    Information Source(s): Patient, Family member, and CareEverywhere/SureScripts via in-person with N95 mask.    Pertinent Information:   Patient suffers from dizziness/vertigo and also tachycardia on occasion. She was prescribed diltiazem  mg at the end of July, and experienced symptoms consistent with dose-too-high (concentration down, weakness, dizziness, tired). Then she was prescribed metoprolol tartrate 12.5 mg BID - patient read the potential side effects and was scared to take this, so never started metoprolol.  Writer encouraged patient to discuss this with her provider.  While in the room, patient complained of feeling dry, asked for IV fluids.  Patient reports she struggles to drink enough water at home.  She also complained of regular constipation issues (hence the daily Miralax) and now states she saw a provider recently and sounds like she may have a potential bowel obstruction.  Considering chronic dizziness, and report of headache - patient may benefit from fluid assessment/plan.    Changes made to PTA medication list:  Added: Miralax, CBD gummies  Deleted: diltiazem ER, calcium-vit D combo   Changed: None    Medication Affordability:  Not including over the counter (OTC) medications, was there a time in the past 3 months when you did not take your medications as prescribed because of cost?: No    Allergies reviewed with patient and updates made in EHR: yes    Medication History Completed By: Bibi Vang, Jaden, BCPS, DPLA 9/17/2023 11:32 AM    Prior to Admission medications    Medication Sig Last Dose Taking? Auth Provider Long Term End Date   acetaminophen (TYLENOL) 500 MG tablet Take 500-1,000 mg by mouth  every 6 hours as needed for mild pain 9/16/2023 at PM Yes Unknown, Entered By History     calcium carbonate (OS-KINJAL) 1500 (600 Ca) MG tablet Take 600 mg by mouth daily 9/16/2023 Yes Reported, Patient     Cholecalciferol 10 MCG (400 UNIT) CAPS Take 400 Units by mouth daily 9/16/2023 Yes Reported, Patient     fluticasone (FLONASE) 50 MCG/ACT nasal spray Spray 1 spray into both nostrils daily as needed 9/16/2023 Yes Reported, Patient     HEMP OIL OR EXTRACT OR OTHER CBD CANNABINOID, NOT MEDICAL CANNABIS, Take 1 each by mouth 2 times daily Takes in the afternoon and at bedtime. 9/16/2023 Yes Unknown, Entered By History     multivitamin, therapeutic with minerals (THERA-VIT-M) TABS Take 1 tablet by mouth daily.   9/16/2023 Yes Reported, Patient     omeprazole (PRILOSEC) 40 MG DR capsule Take 40 mg by mouth daily 9/16/2023 Yes Reported, Patient     polyethylene glycol (MIRALAX) 17 g packet Take 1 packet by mouth daily 9/16/2023 Yes Unknown, Entered By History     pravastatin (PRAVACHOL) 10 MG tablet Take 10 mg by mouth At Bedtime 9/16/2023 Yes Reported, Patient No

## 2023-09-17 NOTE — ED NOTES
Pt. Transorted to MRI, fully monitored. Pt. Very restless/uncomfortable. Tylenol already given for headache, pt. Has urge to have bowel movement, but attempted x2 on bedpan without results. Pt. Also c/o nausea. Notified Dr. Agustin for further orders.

## 2023-09-17 NOTE — ED NOTES
Pt returned to room ED 3 from CT    MRI is not available at this time, notified ER charge for oncall tech

## 2023-09-17 NOTE — ED NOTES
Pt transported to CT for stroke code    En route to CT patient started dry heaving and spitting up spit, states needing to puke. Notified provider, new order for zofran    Zofran given prior to CT imaging

## 2023-09-17 NOTE — ED NOTES
"Talked to pt's , he reports she woke up dizzy from sleeping. Told him \"the room will not stop spinning\" and N/V. Pt has a history of vertigo, but never to this extent.   "

## 2023-09-17 NOTE — ED NOTES
Report given by SWDAVE. Tier II stroke code deescalated. Introduced self to pt and  at bedside. Pt aware of plan of care. Pt just returned from MRI.

## 2023-09-17 NOTE — DISCHARGE INSTRUCTIONS
Your work-up is very reassuring.  Take medications as prescribed for management of your symptoms.  Expect improvement over the next 3 to 5 days.  If you have escalation or symptoms of palpitation concern please return to the emergency department repeat assessment.  Please contact your primary care doctor for follow-up appointment later this week.

## 2023-09-17 NOTE — CONSULTS
"      [facility]    Stroke Consult Note    Reason for Consult: Stroke Code     Chief Complaint: Dizziness      HPI  Anabelle Cameron is a 82 year old female with history of hypertension presents to the ED after she developed a severe dizziness after waking up around 4 AM this morning on 9/17/2023.  Patient's last known well was 11 PM, 9/16/2023 when the patient went to sleep and then woke up at 4 AM with the symptoms of severe dizziness.  Patient was brought to the ED and as per the ED patient was not able to open the eyes because of the severe dizziness and has horizontal jerky movements of the eyes.    Imaging Findings  CTH-no hemorrhage noted  CTA Head and neck: no LVO noted    Intravenous Thrombolysis  Not given due to:   - no evidence of infarct on MRI    Endovascular Treatment  Not initiated due to absence of proximal vessel occlusion    Impression   #Vertigo likely periphery    Recommendations  -No further stroke work up recommended       The Stroke Staff is DALIA Wagner .    Hang Barton MD  Vascular Neurology Fellow    To page me or covering stroke neurology team member, click here: [facility]  Choose \"On Call\" tab at top, then select \"NEUROLOGY/ALL SITES\" from middle drop-down box, press Enter, then look for \"stroke\" or \"telestroke\" for your site.  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 LEFT EYE Occurred at time of IOL/ERM     Past Surgical History   Past Surgical History:   Procedure Laterality Date    APPENDECTOMY OPEN      EP ABLATION SVT N/A 3/16/2023    Procedure: Ablation Supraventricular Tachycardia;  Surgeon: Aime Cobian MD;  Location: [facility]    EP COMPREHENSIVE EP STUDY N/A 3/16/2023    Procedure: Comprehensive Study;  Surgeon: Aime Cobian MD;  Location: [facility]    GYN SURGERY      ORTHOPEDIC SURGERY       Medications   Home Meds  Prior to Admission medications    Medication Sig Start Date End Date Taking? Authorizing Provider   acetaminophen (TYLENOL) 500 MG tablet Take 500-1,000 mg by mouth every 6 hours as needed for mild pain    Unknown, Entered By History   calcium carbonate (OS-KINJAL) 1500 (600 Ca) MG tablet Take 600 mg by mouth daily    Reported, Patient   Calcium Carbonate-Vitamin D 600-5 MG-MCG CAPS Take 1 capsule by mouth daily    Reported, Patient   Cholecalciferol 10 MCG (400 UNIT) CAPS Take 400 Units by mouth daily    Reported, Patient   diltiazem ER (TIAZAC) 120 MG 24 hr ER beaded capsule Take 1 capsule (120 mg) by mouth daily 7/31/23   Madie Hendricks, NP   fluticasone (FLONASE) 50 MCG/ACT nasal spray Spray 1 spray into both nostrils daily as needed    Reported, Patient   multivitamin, therapeutic with minerals (THERA-VIT-M) TABS Take 1 tablet by mouth daily.      Reported, Patient   omeprazole (PRILOSEC) 40 MG DR capsule Take 40 mg by mouth daily 3/10/22   Reported, Patient   pravastatin (PRAVACHOL) 10 MG tablet Take 10 mg by mouth At Bedtime    Reported, Patient       Scheduled Meds   meclizine  25 mg Oral Once    ondansetron  4 mg Intravenous Once       Infusion Meds      PRN Meds      Allergies   Allergies   Allergen Reactions    Levofloxacin Other (See Comments), Dizziness, Nausea and Unknown     dizziness  Patient unable to tolerate side effects    Patient unable to tolerate side  effects  dizziness  Patient unable to tolerate side effects      Hydrocodone-Acetaminophen Other (See Comments) and Unknown     Dizziness, fainting  Other reaction(s): Syncope  Vasovagal episode      Vasovagal episode  Dizziness, fainting  Other reaction(s): Syncope  Vasovagal episode    Hydroxyzine      Kept patient up instead of sleeping    Lisinopril Cough    Oxycodone-Acetaminophen Other (See Comments), Dizziness and Rash     Dizziness, fainting     Family History   History reviewed. No pertinent family history.  Social History   Social History     Tobacco Use    Smoking status: Former     Types: Cigarettes     Quit date:      Years since quittin.7    Smokeless tobacco: Never   Vaping Use    Vaping Use: Never used   Substance Use Topics    Alcohol use: Never     Comment: social    Drug use: No       Review of Systems   Review of systems was not needed on this patient       PHYSICAL EXAMINATION  Temp:  [96.6  F (35.9  C)] 96.6  F (35.9  C)  Pulse:  [76] 76  Resp:  [18] 18  BP: (186)/(90) 186/90  SpO2:  [98 %] 98 %         Imaging  I personally reviewed all imaging; relevant findings per HPI.     Lab Results Data   CBC  No results for input(s): WBC, RBC, HGB, HCT, PLT in the last 168 hours.  Basic Metabolic Panel    Recent Labs   Lab 23  0632   *     Liver Panel  No results for input(s): PROTTOTAL, ALBUMIN, BILITOTAL, ALKPHOS, AST, ALT, BILIDIRECT in the last 168 hours.  INR    Recent Labs   Lab Test 23  1315   INR 1.00      Lipid Profile  No lab results found.  A1C  No lab results found.  Troponin  No results for input(s): CTROPT, TROPONINIS, TROPONINI, GHTROP in the last 168 hours.       Stroke Code Data Data   Stroke Code Data  (for stroke code with tele)  Stroke code activated 23   0630   First stroke provider response 23   0632   Video start time         Video end time         Last known normal 23   2300   Time of discovery  (or onset of symptoms)  23   0400    Head CT read by Stroke Neuro Dr/Provider 09/17/23   0655   Was stroke code de-escalated? Yes 09/17/23 0830           Telestroke Service Details  Type of service telemedicine diagnostic assessment of acute neurological changes   Reason telemedicine is appropriate patient requires assessment with a specialist for diagnosis and treatment of neurological symptoms   Mode of transmission secure interactive audio and video communication per [employer]   Originating site (patient location) [facility]    Distant site (provider location) Provider remote site

## 2023-09-17 NOTE — ED NOTES
Pt completed the ambulation trial with JAN Candelario. Pt able to ambulate without issue. No concerns noted or reported.

## 2023-09-17 NOTE — ED TRIAGE NOTES
Pt woke up around 0400 and reported feeling dizzy. She did not sleep much last night, has been feeling restless.   Sensitivity to light. Denies being on blood thinners.

## 2024-04-15 ENCOUNTER — HOSPITAL ENCOUNTER (OUTPATIENT)
Dept: GENERAL RADIOLOGY | Facility: CLINIC | Age: 84
Discharge: HOME | End: 2024-04-15
Attending: PODIATRIST | Admitting: PODIATRIST
Payer: COMMERCIAL

## 2024-04-15 VITALS — SYSTOLIC BLOOD PRESSURE: 131 MMHG | OXYGEN SATURATION: 99 % | DIASTOLIC BLOOD PRESSURE: 87 MMHG

## 2024-04-15 DIAGNOSIS — M25.50 ARTHRALGIA, UNSPECIFIED JOINT: ICD-10-CM

## 2024-04-15 PROCEDURE — 20605 DRAIN/INJ JOINT/BURSA W/O US: CPT | Mod: LT

## 2024-04-15 PROCEDURE — 250N000011 HC RX IP 250 OP 636: Performed by: PHYSICIAN ASSISTANT

## 2024-04-15 PROCEDURE — 250N000009 HC RX 250: Performed by: PHYSICIAN ASSISTANT

## 2024-04-15 PROCEDURE — 255N000002 HC RX 255 OP 636: Performed by: PHYSICIAN ASSISTANT

## 2024-04-15 RX ORDER — BUPIVACAINE HYDROCHLORIDE 5 MG/ML
30 INJECTION, SOLUTION EPIDURAL; INTRACAUDAL ONCE
Status: COMPLETED | OUTPATIENT
Start: 2024-04-15 | End: 2024-04-15

## 2024-04-15 RX ORDER — TRIAMCINOLONE ACETONIDE 40 MG/ML
80 INJECTION, SUSPENSION INTRA-ARTICULAR; INTRAMUSCULAR ONCE
Status: COMPLETED | OUTPATIENT
Start: 2024-04-15 | End: 2024-04-15

## 2024-04-15 RX ORDER — ETHYL CHLORIDE 100 %
1 AEROSOL, SPRAY (ML) TOPICAL ONCE
Status: COMPLETED | OUTPATIENT
Start: 2024-04-15 | End: 2024-04-15

## 2024-04-15 RX ORDER — IOPAMIDOL 408 MG/ML
10 INJECTION, SOLUTION INTRATHECAL ONCE
Status: COMPLETED | OUTPATIENT
Start: 2024-04-15 | End: 2024-04-15

## 2024-04-15 RX ORDER — LIDOCAINE HYDROCHLORIDE 10 MG/ML
30 INJECTION, SOLUTION EPIDURAL; INFILTRATION; INTRACAUDAL; PERINEURAL ONCE
Status: COMPLETED | OUTPATIENT
Start: 2024-04-15 | End: 2024-04-15

## 2024-04-15 RX ORDER — BETAMETHASONE SODIUM PHOSPHATE AND BETAMETHASONE ACETATE 3; 3 MG/ML; MG/ML
5 INJECTION, SUSPENSION INTRA-ARTICULAR; INTRALESIONAL; INTRAMUSCULAR; SOFT TISSUE ONCE
Status: DISCONTINUED | OUTPATIENT
Start: 2024-04-15 | End: 2024-04-15

## 2024-04-15 RX ADMIN — TRIAMCINOLONE ACETONIDE 80 MG: 40 INJECTION, SUSPENSION INTRA-ARTICULAR; INTRAMUSCULAR at 14:52

## 2024-04-15 RX ADMIN — LIDOCAINE HYDROCHLORIDE 3.5 ML: 10 INJECTION, SOLUTION EPIDURAL; INFILTRATION; INTRACAUDAL; PERINEURAL at 14:54

## 2024-04-15 RX ADMIN — IOPAMIDOL 2 ML: 408 INJECTION, SOLUTION INTRATHECAL at 14:55

## 2024-04-15 RX ADMIN — BUPIVACAINE HYDROCHLORIDE 2 ML: 5 INJECTION, SOLUTION EPIDURAL; INTRACAUDAL; PERINEURAL at 14:53

## 2024-04-15 RX ADMIN — Medication 1 APPLICATION: at 14:46

## 2024-04-15 NOTE — PROCEDURES
[facility]    Procedure: Left talonavicular, left tibiotalar, and left subtalar joint steroid injections    Date/Time: 4/15/2024 3:03 PM    Performed by: Neto Quiroz PA-C  Authorized by: Neto Quiroz PA-C      UNIVERSAL PROTOCOL   Site Marked: Yes  Prior Images Obtained and Reviewed:  Yes  Required items: Required blood products, implants, devices and special equipment available    Patient identity confirmed:  Verbally with patient  Patient was reevaluated immediately before administering moderate or deep sedation or anesthesia  Confirmation Checklist:  Patient's identity using two indicators, relevant allergies, procedure was appropriate and matched the consent or emergent situation and correct equipment/implants were available  Time out: Immediately prior to the procedure a time out was called    Universal Protocol: the Joint Commission Universal Protocol was followed    Preparation: Patient was prepped and draped in usual sterile fashion       ANESTHESIA    Local Anesthetic:  Lidocaine 1% without epinephrine and bupivacaine 0.5% without epinephrine      SEDATION    Patient Sedated: No    See dictated procedure note for full details.    PROCEDURE    Patient Tolerance:  Patient tolerated the procedure well with no immediate complications  Length of time physician/provider present for 1:1 monitoring during sedation: 0

## 2024-04-15 NOTE — DISCHARGE INSTRUCTIONS
JOINT STEROID INJECTION DISCHARGE INSTRUCTIONS    What to expect  After the procedure, you may feel some temporary relief from the local anesthetic, but that will likely wear off within a few hours. Your symptoms may then return to pre-procedure level, or even be temporarily worse for a day or two.  For many people, the steroid begins to provide some relief within 2-3 days, but it can take up to 2 weeks. If you have no improvement in your symptoms after two weeks, please contact your referring provider to discuss next steps.  What to do  Minimize your activity today. You may resume your normal activity tomorrow as tolerated. Avoid vigorous or strenuous activity until your symptoms improve, or as directed by your referring provider.   You may shower tomorrow, but do not submerge in bathtub, hot tub or pool for 48 hours.    Use ice packs as needed for discomfort.  You may resume all medications, including blood thinners.  You may take over the counter acetaminophen (Tylenol) or ibuprofen (Motrin, Advil) for mild discomfort after the procedure.    What to watch for  Bruising or slight swelling at the puncture site can be normal. If you begin to develop redness or excessive swelling at the site, fever over 1010F, notable increase in pain, weakness, or numbness, please contact your primary care or referring provider.  Side effects from the steroid are often mild and go away in a few days. Common side effects may include facial flushing, restlessness, irritability, difficulty sleeping, elevated blood pressure, and increased blood sugar.   If you have diabetes, monitor your blood sugar closely. Contact the provider who manages your diabetes to help you control your blood sugar if needed.  If you have questions or concerns  You may contact the [facility] Radiology Department at [phone] between 8am-4:30pm Monday through Friday.  If you have urgent questions outside of these normal business hours, please contact  the [facility] on-call physician at [phone].    The provider who performed your procedure was Neto Quiroz PA-C

## 2024-06-23 ENCOUNTER — HEALTH MAINTENANCE LETTER (OUTPATIENT)
Age: 84
End: 2024-06-23

## 2025-06-13 ENCOUNTER — HOSPITAL ENCOUNTER (OUTPATIENT)
Facility: CLINIC | Age: 85
End: 2025-06-13
Attending: ORTHOPAEDIC SURGERY | Admitting: ORTHOPAEDIC SURGERY
Payer: COMMERCIAL

## 2025-07-09 RX ORDER — SIMETHICONE 125 MG
125 CAPSULE ORAL
COMMUNITY
End: 2025-08-20 | Stop reason: HOSPADM

## 2025-07-09 RX ORDER — ESTRADIOL 0.5 MG/1
TABLET ORAL
COMMUNITY
End: 2025-08-20 | Stop reason: HOSPADM

## 2025-07-09 RX ORDER — VITAMIN E 268 MG
400 CAPSULE ORAL DAILY
COMMUNITY
End: 2025-08-20 | Stop reason: HOSPADM

## 2025-07-09 RX ORDER — OMEGA-3S/DHA/EPA/FISH OIL/D3 300MG-1000
CAPSULE ORAL
COMMUNITY
End: 2025-08-20 | Stop reason: HOSPADM

## 2025-08-06 VITALS
BODY MASS INDEX: 26.3 KG/M2 | HEART RATE: 80 BPM | WEIGHT: 157.85 LBS | HEIGHT: 65 IN | SYSTOLIC BLOOD PRESSURE: 126 MMHG | RESPIRATION RATE: 18 BRPM | DIASTOLIC BLOOD PRESSURE: 72 MMHG

## (undated) DEVICE — CATHETER IRRIGATED ABLATION BIDIRECTIONAL D-F CURVE 8FR

## (undated) DEVICE — CUSTOM PACK EP

## (undated) DEVICE — TRANSPAC IV MONITORING KIT WI SAFESET RESERVOIR AND TWO BLOOD SAMPLING PORTS 84" TUBING DISPOSABLE TRANSDUCER

## (undated) DEVICE — CATH EP INQUIRY DECAPOLAR 6FR X 110CM LG CRV

## (undated) DEVICE — SHEATH AGILIS 8.5FR X 71CM 408310

## (undated) DEVICE — CATH EP WOVENFLEXIE QUAD 5FRX110CM REPRO SYK200597

## (undated) DEVICE — INTRO SHEATH 8FRX10CM PINNACLE RSS802

## (undated) DEVICE — INTRO SHEATH TERUMO 10FRX25CM PINNACLE RSS006

## (undated) DEVICE — 4FR X 10CM STIFF SHEATH, MINI-STICK MAX COAXIAL VASCULAR ENTRY KIT, 0.018IN STAINLESS STEEL TIP GUIDEWIRE, 21G X 7CM ECHOGENIC NEEDLE

## (undated) DEVICE — GUIDEWIRE JTIP 3MM .035 180CM IQ35F180J3

## (undated) DEVICE — ELECTRODE ADULT PACING MULTI P-211-M1

## (undated) DEVICE — KIT ENSITE SURFACE ELECTRODE ENSITE-SEK-5-01

## (undated) DEVICE — TUBING KIT COOL POINT

## (undated) DEVICE — INTRO TERUMO 8FRX25CM W/MARKER RSB803

## (undated) RX ORDER — BUPIVACAINE HYDROCHLORIDE 5 MG/ML
INJECTION, SOLUTION EPIDURAL; INTRACAUDAL
Status: DISPENSED
Start: 2022-12-06

## (undated) RX ORDER — TRIAMCINOLONE ACETONIDE 40 MG/ML
INJECTION, SUSPENSION INTRA-ARTICULAR; INTRAMUSCULAR
Status: DISPENSED
Start: 2022-12-06

## (undated) RX ORDER — LIDOCAINE HYDROCHLORIDE 10 MG/ML
INJECTION, SOLUTION EPIDURAL; INFILTRATION; INTRACAUDAL; PERINEURAL
Status: DISPENSED
Start: 2022-12-06

## (undated) RX ORDER — ONDANSETRON 2 MG/ML
INJECTION INTRAMUSCULAR; INTRAVENOUS
Status: DISPENSED
Start: 2023-03-16

## (undated) RX ORDER — TRIAMCINOLONE ACETONIDE 40 MG/ML
INJECTION, SUSPENSION INTRA-ARTICULAR; INTRAMUSCULAR
Status: DISPENSED
Start: 2024-04-15

## (undated) RX ORDER — LIDOCAINE HYDROCHLORIDE 10 MG/ML
INJECTION, SOLUTION EPIDURAL; INFILTRATION; INTRACAUDAL; PERINEURAL
Status: DISPENSED
Start: 2024-04-15

## (undated) RX ORDER — PROPOFOL 10 MG/ML
INJECTION, EMULSION INTRAVENOUS
Status: DISPENSED
Start: 2023-03-16

## (undated) RX ORDER — BUPIVACAINE HYDROCHLORIDE 5 MG/ML
INJECTION, SOLUTION EPIDURAL; INTRACAUDAL
Status: DISPENSED
Start: 2024-04-15

## (undated) RX ORDER — ISOPROTERENOL HYDROCHLORIDE 0.2 MG/ML
INJECTION, SOLUTION INTRAVENOUS
Status: DISPENSED
Start: 2023-03-16

## (undated) RX ORDER — BETAMETHASONE SODIUM PHOSPHATE AND BETAMETHASONE ACETATE 3; 3 MG/ML; MG/ML
INJECTION, SUSPENSION INTRA-ARTICULAR; INTRALESIONAL; INTRAMUSCULAR; SOFT TISSUE
Status: DISPENSED
Start: 2024-04-15

## (undated) RX ORDER — FENTANYL CITRATE 50 UG/ML
INJECTION, SOLUTION INTRAMUSCULAR; INTRAVENOUS
Status: DISPENSED
Start: 2023-03-16